# Patient Record
Sex: MALE | Race: BLACK OR AFRICAN AMERICAN | NOT HISPANIC OR LATINO | URBAN - METROPOLITAN AREA
[De-identification: names, ages, dates, MRNs, and addresses within clinical notes are randomized per-mention and may not be internally consistent; named-entity substitution may affect disease eponyms.]

---

## 2017-04-10 VITALS
OXYGEN SATURATION: 99 % | RESPIRATION RATE: 16 BRPM | SYSTOLIC BLOOD PRESSURE: 142 MMHG | DIASTOLIC BLOOD PRESSURE: 86 MMHG | HEART RATE: 67 BPM | TEMPERATURE: 98 F

## 2017-04-10 RX ORDER — CHLORHEXIDINE GLUCONATE 213 G/1000ML
1 SOLUTION TOPICAL ONCE
Qty: 0 | Refills: 0 | Status: DISCONTINUED | OUTPATIENT
Start: 2017-04-12 | End: 2017-04-12

## 2017-04-10 NOTE — H&P ADULT - ASSESSMENT
57 y.o Male, POOR HISTORIAN, Current smoker, with PMHx of HTN, dyslipidemia, CKD Stage 3 (Cr 1.5 by labs done @ [facility]SH on 03/201/17), TIA in 2007 , Non obstructive CAD by cath @ [facility] on 10/23/13 (60% distal OM3 stenosis and 70% Ostial second PDA)- medically managed, s/p admission @ [facility] in 03/2017 for Pulmonary Edema in the setting of a Hypertensive urgency along with NSTEMI and Echo revealing depressed EF and referred to [facility] for recommended Cardiac Cath with possible intervention if clinically indicated to r/o suspected underlying CAD.     Patient took home plavix 75mg once this AM, Loaded with ASA 325mg PO x1  Crt 1.43 CKD stage II on arrival (near baseline, Hx of CKD stage III as outpatient), early for hydration.    Risks & benefits of procedure and alternative therapy have been explained to the patient including but not limited to: allergic reaction, bleeding w/possible need for blood transfusion, infection, renal and vascular compromise, limb damage, arrhythmia, stroke, vessel dissection/perforation, Myocardial infarction, emergent CABG. Informed consent obtained and in chart.

## 2017-04-10 NOTE — H&P ADULT - HISTORY OF PRESENT ILLNESS
57 y.o Male, POOR HISTORIAN, Current smoker, with PMHx of HTN, dyslipidemia, CKD, TIA in 2007 , s/p admission @ [facility] in 03/2017 for SOB found to have Pulmonary Edema in the setting of a Hypertensive urgency along with NSTEMI.  He wasdiuresed  and  medically optimized on his antihypertensive regimen,  and subsequently d/c'd home  after euvolemic State for outpt cardiac workup. Since his discharge, pt reports feeling better.  He does report "slight" MULLIGAN  with increasing fatigue.  He reports an ET of no more than 10 city blocks before becoming SOB and tired. Symptoms are relieved after a minutes of  rest. He denies   experiencing any CP, recent PND/orthopnea, LE edema, palpitations, dizziness, or syncope.  He has had no other cardiac workup done other than an Echo @ [facility]   which revealed     In light of pt's risk factors, Known CAD, above CCS Anginal Class 3 Equivalent Symptoms, and abnormal Stress test, pt is now referred to [facility] for recommended Cardiac Cath with possible intervention if clinically indicated to r/o suspected underlying CAD. 57 y.o Male, POOR HISTORIAN, Current smoker, with PMHx of HTN, dyslipidemia, CKD, TIA in 2007 , Non obstructive CAD with 60% distal OM3 stenosis and 70% Ostial second PDA medically managed, s/p admission @ [facility] in 03/2017 for SOB found to have Pulmonary Edema in the setting of a Hypertensive urgency along with NSTEMI.  He wasdiuresed  and  medically optimized on his antihypertensive regimen,  and subsequently d/c'd home  after euvolemic State for outpt cardiac workup. Since his discharge, pt reports feeling better.  He does report "slight" MULLIGAN  with increasing fatigue.  He reports an ET of no more than 10 city blocks before becoming SOB and tired. Symptoms are relieved after a minutes of  rest. He denies   experiencing any CP, recent PND/orthopnea, LE edema, palpitations, dizziness, or syncope.  He has had no other cardiac workup done other than an Echo done @ [facility] 03/15/17 which revealed moderate concentric LVH, LVEF of 45%, Pseudonormal LV filling pattern, consistent with elevated LA pressure. Mild AR.      In light of pt's risk factors, above CCS Anginal Class 3 Equivalent Symptoms, and depressed EF, pt is now referred to [facility] for recommended Cardiac Cath with possible intervention if clinically indicated to r/o suspected underlying CAD. ***CKD- Early hydration       57 y.o Male, POOR HISTORIAN, Current smoker, with PMHx of HTN, dyslipidemia, CKD, TIA in 2007 , Non obstructive CAD 60% distal OM3 stenosis and 70% Ostial second PDA- medically managed, s/p admission @ [facility] in 03/2017 for Pulmonary Edema in the setting of a Hypertensive urgency along with NSTEMI.  Pt was  diuresed and medically optimized on his antihypertensive regimen and subsequently d/c'd home  after euvolemic State for outpt cardiac workup. Since his discharge, pt reports feeling better.  He does report "slight" MULLIGAN  with increasing fatigue.  He reports an ET of no more than 10 city blocks before becoming SOB and tired. Symptoms are relieved after a minutes of  rest. He denies   experiencing any CP, recent PND/orthopnea, LE edema, palpitations, dizziness, or syncope.  He has had no other cardiac workup done other than an Echo done @ [facility] 03/15/17 which revealed moderate concentric LVH, LVEF of 45%, Pseudonormal LV filling pattern, consistent with elevated LA pressure. Mild AR.      In light of pt's risk factors, above CCS Anginal Class 3 Equivalent Symptoms, and depressed EF, pt is now referred to [facility] for recommended Cardiac Cath with possible intervention if clinically indicated to r/o suspected underlying CAD. ****PT TO BRING IN ALL MEDS       ***CKD- Early hydration       57 y.o Male, POOR HISTORIAN, Current smoker, with PMHx of HTN, dyslipidemia, CKD, TIA in 2007 , Non obstructive CAD 60% distal OM3 stenosis and 70% Ostial second PDA- medically managed, s/p admission @ [facility] in 03/2017 for Pulmonary Edema in the setting of a Hypertensive urgency along with NSTEMI.  Pt was  diuresed and medically optimized on his antihypertensive regimen and subsequently d/c'd home  after euvolemic State for outpt cardiac workup. Since his discharge, pt reports feeling better.  He does report "slight" MULLIGAN  with increasing fatigue.  He reports an ET of no more than 10 city blocks before becoming SOB and tired. Symptoms are relieved after a minutes of  rest. He denies   experiencing any CP, recent PND/orthopnea, LE edema, palpitations, dizziness, or syncope.  He has had no other cardiac workup done other than an Echo done @ [facility] 03/15/17 which revealed moderate concentric LVH, LVEF of 45%, Pseudonormal LV filling pattern, consistent with elevated LA pressure. Mild AR.      In light of pt's risk factors, above CCS Anginal Class 3 Equivalent Symptoms, and depressed EF, pt is now referred to [facility] for recommended Cardiac Cath with possible intervention if clinically indicated to r/o suspected underlying CAD. ****PT TO BRING IN ALL MEDS       ***CKD- Early hydration       57 y.o Male, POOR HISTORIAN, Current smoker, with PMHx of HTN, dyslipidemia, CKD, TIA in 2007 , Non obstructive CAD by cath @ [facility] on 10/23/13 (60% distal OM3 stenosis and 70% Ostial second PDA)- medically managed, s/p admission @ [facility] in 03/2017 for Pulmonary Edema in the setting of a Hypertensive urgency along with NSTEMI.  Pt was  diuresed and medically optimized on his antihypertensive regimen and subsequently d/c'd home  after euvolemic State for outpt cardiac workup. Since his discharge, pt reports feeling better.  He does report "slight" MULLIGAN  with increasing fatigue.  He reports an ET of no more than 10 city blocks before becoming SOB and tired. Symptoms are relieved after a minutes of  rest. He denies   experiencing any CP, recent PND/orthopnea, LE edema, palpitations, dizziness, or syncope.  He has had no other cardiac workup done other than an Echo done @ [facility] 03/15/17 which revealed moderate concentric LVH, LVEF of 45%, Pseudonormal LV filling pattern, consistent with elevated LA pressure. Mild AR.      In light of pt's risk factors, above CCS Anginal Class 3 Equivalent Symptoms, and depressed EF, pt is now referred to [facility] for recommended Cardiac Cath with possible intervention if clinically indicated to r/o suspected underlying CAD. ****PT TO BRING IN ALL MEDS       ***CKD- Early hydration       57 y.o Male, POOR HISTORIAN, Current smoker, with PMHx of HTN, dyslipidemia, CKD Stage 3 (Cr 1.5 by labs done @ OSH on 03/201/17), TIA in 2007 , Non obstructive CAD by cath @ [facility] on 10/23/13 (60% distal OM3 stenosis and 70% Ostial second PDA)- medically managed, s/p admission @ [facility] in 03/2017 for Pulmonary Edema in the setting of a Hypertensive urgency along with NSTEMI.  Pt was  diuresed and medically optimized on his antihypertensive regimen and subsequently d/c'd home  after euvolemic State for outpt cardiac workup. Since his discharge, pt reports feeling better.  He does report "slight" MULLIGAN  with increasing fatigue.  He reports an ET of no more than 10 city blocks before becoming SOB and tired. Symptoms are relieved after a minutes of  rest. He denies   experiencing any CP, recent PND/orthopnea, LE edema, palpitations, dizziness, or syncope.  He has had no other cardiac workup done other than an Echo done @ [facility] 03/15/17 which revealed moderate concentric LVH, LVEF of 45%, Pseudonormal LV filling pattern, consistent with elevated LA pressure. Mild AR.      In light of pt's risk factors, above CCS Anginal Class 3 Equivalent Symptoms, and depressed EF, pt is now referred to [facility] for recommended Cardiac Cath with possible intervention if clinically indicated to r/o suspected underlying CAD. ****PT TO BRING IN ALL MEDS       ***CKD- Early hydration       57 y.o Male, POOR HISTORIAN, Current smoker, with PMHx of HTN, dyslipidemia, CKD Stage 3 (Cr 1.5 by labs done @ OSH on 03/201/17), TIA in 2007 , Non obstructive CAD by cath @ [facility] on 10/23/13 (60% distal OM3 stenosis and 70% Ostial second PDA)- medically managed, s/p admission @ [facility] in 03/2017 for Pulmonary Edema in the setting of a Hypertensive urgency along with NSTEMI.  Pt was  diuresed and medically optimized on his antihypertensive regimen and subsequently d/c'd home  after euvolemic State for outpt cardiac workup. Since his discharge, pt reports feeling better.  He does report "slight" MULLIGAN  with increasing fatigue.  He reports an ET of no more than 10 city blocks before becoming SOB and tired. Symptoms are relieved after a minutes of  rest. He denies   experiencing any CP, recent PND/orthopnea, LE edema, palpitations, dizziness, or syncope.  He has had no other cardiac workup done other than an Echo done @ [facility] 03/15/17 which revealed moderate concentric LVH, LVEF of 45%, Pseudonormal LV filling pattern, consistent with elevated LA pressure. Mild AR.      In light of pt's risk factors, above CCS Anginal Class 3 Equivalent Symptoms, and depressed EF, pt is now referred to [facility] for recommended Cardiac Cath with possible intervention if clinically indicated to r/o suspected underlying CAD. 57 y.o Male, POOR HISTORIAN, Current smoker, with PMHx of HTN, dyslipidemia, CKD Stage 3 (Cr 1.5 by labs done @ OSH on 03/201/17), TIA in 2007 , Non obstructive CAD by cath @ [facility] on 10/23/13 (60% distal OM3 stenosis and 70% Ostial second PDA)- medically managed, s/p admission @ [facility] in 03/2017 for Pulmonary Edema in the setting of a Hypertensive urgency along with NSTEMI.  Pt was  diuresed and medically optimized on his antihypertensive regimen and subsequently d/c'd home  after euvolemic State for outpt cardiac workup. Since his discharge, pt reports feeling better.  He does report "slight" MULLIGAN  with increasing fatigue.  He reports an ET of no more than 10 city blocks before becoming SOB and tired. Symptoms are relieved after a minutes of  rest. He denies   experiencing any CP, recent PND/orthopnea, LE edema, palpitations, dizziness, or syncope.  He has had no other cardiac workup done other than an Echo done @ [facility] 03/15/17 which revealed moderate concentric LVH, LVEF of 45%, Pseudonormal LV filling pattern, consistent with elevated LA pressure. Mild AR.      In light of pt's risk factors, above CCS Anginal Class 3 Equivalent Symptoms, and depressed EF, pt is now referred to [facility] for recommended Cardiac Cath with possible intervention if clinically indicated to r/o suspected underlying CAD.

## 2017-04-10 NOTE — H&P ADULT - PMH
CKD (chronic kidney disease)    Hyperlipidemia    Hypertension    NSTEMI (non-ST elevation myocardial infarction)    TIA (transient ischemic attack)

## 2017-04-12 ENCOUNTER — OUTPATIENT (OUTPATIENT)
Dept: OUTPATIENT SERVICES | Facility: HOSPITAL | Age: 57
LOS: 1 days | Discharge: NURSING, REHAB OR LONG-TERM CARE | End: 2017-04-12
Payer: COMMERCIAL

## 2017-04-12 DIAGNOSIS — I25.110 ATHEROSCLEROTIC HEART DISEASE OF NATIVE CORONARY ARTERY WITH UNSTABLE ANGINA PECTORIS: ICD-10-CM

## 2017-04-12 DIAGNOSIS — R94.39 ABNORMAL RESULT OF OTHER CARDIOVASCULAR FUNCTION STUDY: ICD-10-CM

## 2017-04-12 DIAGNOSIS — E78.5 HYPERLIPIDEMIA, UNSPECIFIED: ICD-10-CM

## 2017-04-12 DIAGNOSIS — I10 ESSENTIAL (PRIMARY) HYPERTENSION: ICD-10-CM

## 2017-04-12 DIAGNOSIS — Z82.49 FAMILY HISTORY OF ISCHEMIC HEART DISEASE AND OTHER DISEASES OF THE CIRCULATORY SYSTEM: ICD-10-CM

## 2017-04-12 LAB
ALBUMIN SERPL ELPH-MCNC: 3.9 G/DL — SIGNIFICANT CHANGE UP (ref 3.4–5)
ALP SERPL-CCNC: 53 U/L — SIGNIFICANT CHANGE UP (ref 40–120)
ALT FLD-CCNC: 31 U/L — SIGNIFICANT CHANGE UP (ref 12–42)
ANION GAP SERPL CALC-SCNC: 6 MMOL/L — LOW (ref 9–16)
APTT BLD: 33.4 SEC — SIGNIFICANT CHANGE UP (ref 27.5–37.4)
AST SERPL-CCNC: 15 U/L — SIGNIFICANT CHANGE UP (ref 15–37)
BASOPHILS NFR BLD AUTO: 0.4 % — SIGNIFICANT CHANGE UP (ref 0–2)
BILIRUB SERPL-MCNC: 0.4 MG/DL — SIGNIFICANT CHANGE UP (ref 0.2–1.2)
BUN SERPL-MCNC: 15 MG/DL — SIGNIFICANT CHANGE UP (ref 7–23)
CALCIUM SERPL-MCNC: 8.2 MG/DL — LOW (ref 8.5–10.5)
CHLORIDE SERPL-SCNC: 108 MMOL/L — SIGNIFICANT CHANGE UP (ref 96–108)
CHOLEST SERPL-MCNC: 91 MG/DL — SIGNIFICANT CHANGE UP
CK MB CFR SERPL CALC: 1.2 NG/ML — SIGNIFICANT CHANGE UP (ref 0.5–3.6)
CO2 SERPL-SCNC: 25 MMOL/L — SIGNIFICANT CHANGE UP (ref 22–31)
CREAT SERPL-MCNC: 1.43 MG/DL — HIGH (ref 0.5–1.3)
CRP SERPL-MCNC: <0.29 MG/DL — SIGNIFICANT CHANGE UP
EOSINOPHIL NFR BLD AUTO: 5.1 % — SIGNIFICANT CHANGE UP (ref 0–6)
GLUCOSE SERPL-MCNC: 115 MG/DL — HIGH (ref 70–99)
HBA1C BLD-MCNC: 6.2 % — HIGH (ref 4.8–5.6)
HCT VFR BLD CALC: 42.1 % — SIGNIFICANT CHANGE UP (ref 39–50)
HDLC SERPL-MCNC: 38 MG/DL — LOW
HGB BLD-MCNC: 14.8 G/DL — SIGNIFICANT CHANGE UP (ref 13–17)
INR BLD: 1.01 — SIGNIFICANT CHANGE UP (ref 0.88–1.16)
LIPID PNL WITH DIRECT LDL SERPL: 25 MG/DL — SIGNIFICANT CHANGE UP
LYMPHOCYTES # BLD AUTO: 32.1 % — SIGNIFICANT CHANGE UP (ref 13–44)
MCHC RBC-ENTMCNC: 30.1 PG — SIGNIFICANT CHANGE UP (ref 27–34)
MCHC RBC-ENTMCNC: 35.2 G/DL — SIGNIFICANT CHANGE UP (ref 32–36)
MCV RBC AUTO: 85.6 FL — SIGNIFICANT CHANGE UP (ref 80–100)
MONOCYTES NFR BLD AUTO: 5.7 % — SIGNIFICANT CHANGE UP (ref 2–14)
NEUTROPHILS NFR BLD AUTO: 56.7 % — SIGNIFICANT CHANGE UP (ref 43–77)
PLATELET # BLD AUTO: 195 K/UL — SIGNIFICANT CHANGE UP (ref 150–400)
POTASSIUM SERPL-MCNC: 4.2 MMOL/L — SIGNIFICANT CHANGE UP (ref 3.5–5.3)
POTASSIUM SERPL-SCNC: 4.2 MMOL/L — SIGNIFICANT CHANGE UP (ref 3.5–5.3)
PROT SERPL-MCNC: 7.1 G/DL — SIGNIFICANT CHANGE UP (ref 6.4–8.2)
PROTHROM AB SERPL-ACNC: 11.2 SEC — SIGNIFICANT CHANGE UP (ref 9.8–12.7)
RBC # BLD: 4.92 M/UL — SIGNIFICANT CHANGE UP (ref 4.2–5.8)
RBC # FLD: 14.3 % — SIGNIFICANT CHANGE UP (ref 10.3–16.9)
SODIUM SERPL-SCNC: 139 MMOL/L — SIGNIFICANT CHANGE UP (ref 135–145)
TOTAL CHOLESTEROL/HDL RATIO MEASUREMENT: 2.4 RATIO — SIGNIFICANT CHANGE UP
TRIGL SERPL-MCNC: 138 MG/DL — SIGNIFICANT CHANGE UP
WBC # BLD: 6.8 K/UL — SIGNIFICANT CHANGE UP (ref 3.8–10.5)
WBC # FLD AUTO: 6.8 K/UL — SIGNIFICANT CHANGE UP (ref 3.8–10.5)

## 2017-04-12 PROCEDURE — 93010 ELECTROCARDIOGRAM REPORT: CPT

## 2017-04-12 PROCEDURE — 85730 THROMBOPLASTIN TIME PARTIAL: CPT

## 2017-04-12 PROCEDURE — C1769: CPT

## 2017-04-12 PROCEDURE — 82553 CREATINE MB FRACTION: CPT

## 2017-04-12 PROCEDURE — 83036 HEMOGLOBIN GLYCOSYLATED A1C: CPT

## 2017-04-12 PROCEDURE — C1887: CPT

## 2017-04-12 PROCEDURE — 85025 COMPLETE CBC W/AUTO DIFF WBC: CPT

## 2017-04-12 PROCEDURE — 93458 L HRT ARTERY/VENTRICLE ANGIO: CPT | Mod: 26

## 2017-04-12 PROCEDURE — 93005 ELECTROCARDIOGRAM TRACING: CPT

## 2017-04-12 PROCEDURE — 86140 C-REACTIVE PROTEIN: CPT

## 2017-04-12 PROCEDURE — 85610 PROTHROMBIN TIME: CPT

## 2017-04-12 PROCEDURE — 93458 L HRT ARTERY/VENTRICLE ANGIO: CPT

## 2017-04-12 PROCEDURE — 80053 COMPREHEN METABOLIC PANEL: CPT

## 2017-04-12 PROCEDURE — 80061 LIPID PANEL: CPT

## 2017-04-12 PROCEDURE — 36415 COLL VENOUS BLD VENIPUNCTURE: CPT

## 2017-04-12 PROCEDURE — 82550 ASSAY OF CK (CPK): CPT

## 2017-04-12 RX ORDER — SODIUM CHLORIDE 9 MG/ML
500 INJECTION INTRAMUSCULAR; INTRAVENOUS; SUBCUTANEOUS
Qty: 0 | Refills: 0 | Status: DISCONTINUED | OUTPATIENT
Start: 2017-04-12 | End: 2017-04-12

## 2017-04-12 RX ORDER — HYDRALAZINE HCL 50 MG
0 TABLET ORAL
Qty: 0 | Refills: 0 | COMMUNITY

## 2017-04-12 RX ORDER — LOSARTAN POTASSIUM 100 MG/1
1 TABLET, FILM COATED ORAL
Qty: 0 | Refills: 0 | COMMUNITY

## 2017-04-12 RX ORDER — AMLODIPINE BESYLATE 2.5 MG/1
5 TABLET ORAL ONCE
Qty: 0 | Refills: 0 | Status: DISCONTINUED | OUTPATIENT
Start: 2017-04-12 | End: 2017-04-12

## 2017-04-12 RX ORDER — ASPIRIN/CALCIUM CARB/MAGNESIUM 324 MG
1 TABLET ORAL
Qty: 0 | Refills: 0 | COMMUNITY

## 2017-04-12 RX ORDER — ASPIRIN/CALCIUM CARB/MAGNESIUM 324 MG
325 TABLET ORAL ONCE
Qty: 0 | Refills: 0 | Status: COMPLETED | OUTPATIENT
Start: 2017-04-12 | End: 2017-04-12

## 2017-04-12 RX ADMIN — SODIUM CHLORIDE 75 MILLILITER(S): 9 INJECTION INTRAMUSCULAR; INTRAVENOUS; SUBCUTANEOUS at 11:14

## 2017-04-12 RX ADMIN — Medication 325 MILLIGRAM(S): at 11:16

## 2019-01-07 ENCOUNTER — INPATIENT (INPATIENT)
Facility: HOSPITAL | Age: 59
LOS: 0 days | Discharge: HOME | DRG: 281 | End: 2019-01-08
Attending: INTERNAL MEDICINE | Admitting: INTERNAL MEDICINE
Payer: COMMERCIAL

## 2019-01-07 VITALS
TEMPERATURE: 98 F | HEART RATE: 72 BPM | RESPIRATION RATE: 16 BRPM | OXYGEN SATURATION: 100 % | SYSTOLIC BLOOD PRESSURE: 123 MMHG | DIASTOLIC BLOOD PRESSURE: 77 MMHG

## 2019-01-07 DIAGNOSIS — E78.5 HYPERLIPIDEMIA, UNSPECIFIED: ICD-10-CM

## 2019-01-07 DIAGNOSIS — N18.9 CHRONIC KIDNEY DISEASE, UNSPECIFIED: ICD-10-CM

## 2019-01-07 DIAGNOSIS — I21.4 NON-ST ELEVATION (NSTEMI) MYOCARDIAL INFARCTION: ICD-10-CM

## 2019-01-07 DIAGNOSIS — F17.200 NICOTINE DEPENDENCE, UNSPECIFIED, UNCOMPLICATED: ICD-10-CM

## 2019-01-07 DIAGNOSIS — J18.9 PNEUMONIA, UNSPECIFIED ORGANISM: ICD-10-CM

## 2019-01-07 DIAGNOSIS — I50.22 CHRONIC SYSTOLIC (CONGESTIVE) HEART FAILURE: ICD-10-CM

## 2019-01-07 DIAGNOSIS — I10 ESSENTIAL (PRIMARY) HYPERTENSION: ICD-10-CM

## 2019-01-07 PROBLEM — G45.9 TRANSIENT CEREBRAL ISCHEMIC ATTACK, UNSPECIFIED: Chronic | Status: ACTIVE | Noted: 2017-04-10

## 2019-01-07 LAB
ALBUMIN SERPL ELPH-MCNC: 3.7 G/DL — SIGNIFICANT CHANGE UP (ref 3.3–5)
ALP SERPL-CCNC: 62 U/L — SIGNIFICANT CHANGE UP (ref 40–120)
ALT FLD-CCNC: 114 U/L — HIGH (ref 10–45)
ANION GAP SERPL CALC-SCNC: 17 MMOL/L — SIGNIFICANT CHANGE UP (ref 5–17)
APTT BLD: 32.9 SEC — SIGNIFICANT CHANGE UP (ref 27.5–36.3)
AST SERPL-CCNC: 49 U/L — HIGH (ref 10–40)
BASOPHILS NFR BLD AUTO: 0.5 % — SIGNIFICANT CHANGE UP (ref 0–2)
BILIRUB SERPL-MCNC: 0.3 MG/DL — SIGNIFICANT CHANGE UP (ref 0.2–1.2)
BUN SERPL-MCNC: 17 MG/DL — SIGNIFICANT CHANGE UP (ref 7–23)
CALCIUM SERPL-MCNC: 8.8 MG/DL — SIGNIFICANT CHANGE UP (ref 8.4–10.5)
CHLORIDE SERPL-SCNC: 102 MMOL/L — SIGNIFICANT CHANGE UP (ref 96–108)
CHOLEST SERPL-MCNC: 124 MG/DL — SIGNIFICANT CHANGE UP (ref 10–199)
CK MB CFR SERPL CALC: 1.8 NG/ML — SIGNIFICANT CHANGE UP (ref 0–6.7)
CK SERPL-CCNC: 107 U/L — SIGNIFICANT CHANGE UP (ref 30–200)
CO2 SERPL-SCNC: 19 MMOL/L — LOW (ref 22–31)
CREAT SERPL-MCNC: 1.08 MG/DL — SIGNIFICANT CHANGE UP (ref 0.5–1.3)
CRP SERPL-MCNC: 0.95 MG/DL — HIGH (ref 0–0.4)
EOSINOPHIL NFR BLD AUTO: 2.6 % — SIGNIFICANT CHANGE UP (ref 0–6)
GLUCOSE SERPL-MCNC: 96 MG/DL — SIGNIFICANT CHANGE UP (ref 70–99)
HBA1C BLD-MCNC: 6.1 % — HIGH (ref 4–5.6)
HCT VFR BLD CALC: 42.2 % — SIGNIFICANT CHANGE UP (ref 39–50)
HDLC SERPL-MCNC: 24 MG/DL — LOW
HGB BLD-MCNC: 14.2 G/DL — SIGNIFICANT CHANGE UP (ref 13–17)
INR BLD: 1.05 — SIGNIFICANT CHANGE UP (ref 0.88–1.16)
LIPID PNL WITH DIRECT LDL SERPL: 41 MG/DL — SIGNIFICANT CHANGE UP
LYMPHOCYTES # BLD AUTO: 20.4 % — SIGNIFICANT CHANGE UP (ref 13–44)
MCHC RBC-ENTMCNC: 29.5 PG — SIGNIFICANT CHANGE UP (ref 27–34)
MCHC RBC-ENTMCNC: 33.6 G/DL — SIGNIFICANT CHANGE UP (ref 32–36)
MCV RBC AUTO: 87.7 FL — SIGNIFICANT CHANGE UP (ref 80–100)
MONOCYTES NFR BLD AUTO: 3.9 % — SIGNIFICANT CHANGE UP (ref 2–14)
NEUTROPHILS NFR BLD AUTO: 72.6 % — SIGNIFICANT CHANGE UP (ref 43–77)
PLATELET # BLD AUTO: 402 K/UL — HIGH (ref 150–400)
POTASSIUM SERPL-MCNC: 4.6 MMOL/L — SIGNIFICANT CHANGE UP (ref 3.5–5.3)
POTASSIUM SERPL-SCNC: 4.6 MMOL/L — SIGNIFICANT CHANGE UP (ref 3.5–5.3)
PROT SERPL-MCNC: 7.5 G/DL — SIGNIFICANT CHANGE UP (ref 6–8.3)
PROTHROM AB SERPL-ACNC: 11.9 SEC — SIGNIFICANT CHANGE UP (ref 10–12.9)
RBC # BLD: 4.81 M/UL — SIGNIFICANT CHANGE UP (ref 4.2–5.8)
RBC # FLD: 15.6 % — SIGNIFICANT CHANGE UP (ref 10.3–16.9)
SODIUM SERPL-SCNC: 138 MMOL/L — SIGNIFICANT CHANGE UP (ref 135–145)
TOTAL CHOLESTEROL/HDL RATIO MEASUREMENT: 5.2 RATIO — SIGNIFICANT CHANGE UP (ref 3.4–9.6)
TRIGL SERPL-MCNC: 293 MG/DL — HIGH (ref 10–149)
TROPONIN T SERPL-MCNC: 0.02 NG/ML — HIGH (ref 0–0.01)
WBC # BLD: 8.5 K/UL — SIGNIFICANT CHANGE UP (ref 3.8–10.5)
WBC # FLD AUTO: 8.5 K/UL — SIGNIFICANT CHANGE UP (ref 3.8–10.5)

## 2019-01-07 PROCEDURE — 93010 ELECTROCARDIOGRAM REPORT: CPT

## 2019-01-07 RX ORDER — ASPIRIN/CALCIUM CARB/MAGNESIUM 324 MG
81 TABLET ORAL DAILY
Qty: 0 | Refills: 0 | Status: DISCONTINUED | OUTPATIENT
Start: 2019-01-07 | End: 2019-01-08

## 2019-01-07 RX ORDER — ISOSORBIDE DINITRATE 5 MG/1
1 TABLET ORAL
Qty: 0 | Refills: 0 | COMMUNITY

## 2019-01-07 RX ORDER — AMLODIPINE BESYLATE 2.5 MG/1
10 TABLET ORAL DAILY
Qty: 0 | Refills: 0 | Status: DISCONTINUED | OUTPATIENT
Start: 2019-01-07 | End: 2019-01-08

## 2019-01-07 RX ORDER — SODIUM CHLORIDE 9 MG/ML
500 INJECTION INTRAMUSCULAR; INTRAVENOUS; SUBCUTANEOUS
Qty: 0 | Refills: 0 | Status: DISCONTINUED | OUTPATIENT
Start: 2019-01-07 | End: 2019-01-07

## 2019-01-07 RX ORDER — HYDRALAZINE HCL 50 MG
50 TABLET ORAL THREE TIMES A DAY
Qty: 0 | Refills: 0 | Status: DISCONTINUED | OUTPATIENT
Start: 2019-01-07 | End: 2019-01-08

## 2019-01-07 RX ORDER — LOSARTAN POTASSIUM 100 MG/1
100 TABLET, FILM COATED ORAL DAILY
Qty: 0 | Refills: 0 | Status: DISCONTINUED | OUTPATIENT
Start: 2019-01-07 | End: 2019-01-08

## 2019-01-07 RX ORDER — SODIUM CHLORIDE 9 MG/ML
1000 INJECTION INTRAMUSCULAR; INTRAVENOUS; SUBCUTANEOUS
Qty: 0 | Refills: 0 | Status: DISCONTINUED | OUTPATIENT
Start: 2019-01-07 | End: 2019-01-08

## 2019-01-07 RX ORDER — CARVEDILOL PHOSPHATE 80 MG/1
25 CAPSULE, EXTENDED RELEASE ORAL EVERY 12 HOURS
Qty: 0 | Refills: 0 | Status: DISCONTINUED | OUTPATIENT
Start: 2019-01-07 | End: 2019-01-08

## 2019-01-07 RX ORDER — CHLORHEXIDINE GLUCONATE 213 G/1000ML
1 SOLUTION TOPICAL ONCE
Qty: 0 | Refills: 0 | Status: DISCONTINUED | OUTPATIENT
Start: 2019-01-07 | End: 2019-01-07

## 2019-01-07 RX ORDER — INFLUENZA VIRUS VACCINE 15; 15; 15; 15 UG/.5ML; UG/.5ML; UG/.5ML; UG/.5ML
0.5 SUSPENSION INTRAMUSCULAR ONCE
Qty: 0 | Refills: 0 | Status: COMPLETED | OUTPATIENT
Start: 2019-01-07 | End: 2019-01-07

## 2019-01-07 RX ORDER — CLOPIDOGREL BISULFATE 75 MG/1
75 TABLET, FILM COATED ORAL DAILY
Qty: 0 | Refills: 0 | Status: DISCONTINUED | OUTPATIENT
Start: 2019-01-07 | End: 2019-01-08

## 2019-01-07 RX ORDER — CARVEDILOL PHOSPHATE 80 MG/1
1 CAPSULE, EXTENDED RELEASE ORAL
Qty: 0 | Refills: 0 | COMMUNITY

## 2019-01-07 RX ORDER — ISOSORBIDE MONONITRATE 60 MG/1
30 TABLET, EXTENDED RELEASE ORAL DAILY
Qty: 0 | Refills: 0 | Status: DISCONTINUED | OUTPATIENT
Start: 2019-01-07 | End: 2019-01-08

## 2019-01-07 RX ADMIN — SODIUM CHLORIDE 75 MILLILITER(S): 9 INJECTION INTRAMUSCULAR; INTRAVENOUS; SUBCUTANEOUS at 18:52

## 2019-01-07 RX ADMIN — Medication 50 MILLIGRAM(S): at 23:45

## 2019-01-07 NOTE — H&P ADULT - HISTORY OF PRESENT ILLNESS
**INCOMPLETE  59 y/o Male, POOR HISTORIAN, Current smoker (7cigs daily), with PMHx of HTN, HLD, CKD Stage 3 (previous Cr 1.5 in 2017), TIA in 2007, known CAD which has been medically managed, prior admissions to [facility] in 2017 for NSTEMI/HTN urgency/pulmonary edema, who presented to [facility] on 12/30/18 complaining of shortness of breath and dizziness. He reported that he was very short of breath every time he stood up and attempted to walk, relieved with rest. He also reported fevers and productive cough at that time. Labs showed elevated lactic acid, elevated troponin. EKG showed NSR with LVH, no ischemic or ST/T changes. CT scan showed right lower lobe pneumonia. He was admitted for further management of sepsis in the setting of pneumonia and NSTEMI. He was treated with Heparin IV for 48 hours for ACS protocol. He also completed a course of Ceftriaxone/Azithromycin x 7 days (started abx on 12/30/18, last dose 1/5/19) for CAP. He underwent diagnostic cardiac [facility] 1/[facility]/19: RCA moderate luminal irregularities, LM normal, midLAD 70% ectatic vessel, distal LCx 80% ectatic vessel, right radial access. He is now transferred to [facility] for cardiac cath with possible intervention of known CAD if clinically indicated. On arrival to [facility] ED, vitals showed? EKG? Labs?       Diagnostic studies @[facility]:   -	CT Head: No acute infarct or hemorrhage.  -	Blood cultures show no growth for 5 days.  -	Echo 12/31/18 shows overall LV systolic function mildly impaired with EF 45%, RV systolic pressure normal at < 35mmHg.   -	CT Abdomen/Pelvis showed no urinary tract calculus or obstruction, right lower lobe pneumonia. Fecal occult blood was negative. 57 y/o Male, POOR HISTORIAN, Current smoker (0.5ppd daily), with PMHx of HTN, HLD, CKD Stage 3 (previous Cr 1.5 in 2017), TIA in 2007 (no residual deficits), known CAD which has been medically managed, prior admissions to [facility] in 2017 for NSTEMI/HTN urgency/pulmonary edema, who presented to [facility] on 12/30/18 complaining of shortness of breath and dizziness. He reported that he was very short of breath every time he stood up and attempted to walk, relieved with rest. He also reported fevers and productive cough at that time. Labs showed elevated lactic acid, elevated troponin. EKG showed NSR with LVH, no ischemic or ST/T changes. CT scan showed right lower lobe pneumonia. He was admitted for further management of sepsis in the setting of pneumonia and NSTEMI. He was treated with Heparin IV for 48 hours for ACS protocol. He also completed a course of Ceftriaxone/Azithromycin x 7 days (started abx on 12/30/18, last dose 1/5/19) for CAP. He underwent diagnostic cardiac cath @[facility] 1/4/19: RCA moderate luminal irregularities, LM normal, midLAD 70% ectatic vessel, distal LCx 80% ectatic vessel, right radial access. He is now transferred to [facility] for cardiac cath. On arrival to [facility] ED, VSS. EKG shows NSR, q waves in leads II, III, aVF.  Labs significant for ?  He now presents for cardiac catheterization with possible intervention of known CAD if clinically indicated.       Diagnostic studies @[facility]:   -	CT Head: No acute infarct or hemorrhage.  -	Blood cultures show no growth for 5 days.  -	Echo 12/31/18 shows overall LV systolic function mildly impaired with EF 45%, RV systolic pressure normal at < 35mmHg.   -	CT Abdomen/Pelvis showed no urinary tract calculus or obstruction, right lower lobe pneumonia. Fecal occult blood was negative. 57 y/o Male, POOR HISTORIAN, Current smoker (0.5ppd daily), with PMHx of HTN, HLD, CKD Stage 3 (previous Cr 1.5 in 2017), TIA in 2007 (no residual deficits), known CAD which has been medically managed, prior admissions to [facility] in 2017 for NSTEMI/HTN urgency/pulmonary edema, who presented to [facility] on 12/30/18 complaining of shortness of breath and dizziness. He reported that he was very short of breath every time he stood up and attempted to walk, relieved with rest. He also reported fevers and productive cough at that time. Labs showed elevated lactic acid, elevated troponin. EKG showed NSR with LVH, no ischemic or ST/T changes. CT scan showed right lower lobe pneumonia. He was admitted for further management of sepsis in the setting of pneumonia and NSTEMI. He was treated with Heparin IV for 48 hours for ACS protocol. He also completed a course of Ceftriaxone/Azithromycin x 7 days (started abx on 12/30/18, last dose 1/5/19) for CAP. He underwent diagnostic cardiac cath @[facility] 1/4/19: RCA moderate luminal irregularities, LM normal, midLAD 70% ectatic vessel, distal LCx 80% ectatic vessel, right radial access. He is now transferred to [facility] for cardiac cath. On arrival to [facility] ED, VSS. EKG shows NSR, q waves in leads II, III, aVF.  Labs significant for troponin 0.02. He now presents for cardiac catheterization with possible intervention of known CAD if clinically indicated.       Diagnostic studies @[facility]:   -	CT Head: No acute infarct or hemorrhage.  -	Blood cultures show no growth for 5 days.  -	Echo 12/31/18 shows overall LV systolic function mildly impaired with EF 45%, RV systolic pressure normal at < 35mmHg.   -	CT Abdomen/Pelvis showed no urinary tract calculus or obstruction, right lower lobe pneumonia. Fecal occult blood was negative.

## 2019-01-07 NOTE — H&P ADULT - PROBLEM SELECTOR PLAN 4
- F/u lipid panel   - Continue Lipitor 40mg daily - F/u lipid panel   - Hold Lipitor 40mg daily in the setting of LFTs, f/u repeat LFTs in AM

## 2019-01-07 NOTE — H&P ADULT - PROBLEM SELECTOR PLAN 6
Diagnosed with RLL pneumonia @[facility], s/p treatment with Ceftriaxone 1g daily and Azithromycin 500mg daily x 7 days (from 12/30/18-1/5/19)  - Currently afebrile, denies productive cough  - Continue to monitor Diagnosed with RLL pneumonia @[facility], s/p treatment with Ceftriaxone 1g daily and Azithromycin 500mg daily x 7 days (from 12/30/18-1/5/19)  - Currently afebrile, denies productive cough  - Continue to monitor  - F/u CXR in AM

## 2019-01-07 NOTE — H&P ADULT - PROBLEM SELECTOR PLAN 3
SBP stable  - Continue Hydralazine 50mg q8 hrs, Amlodipine 10mg daily, Losartan 100mg daily, Coreg 25mg BID, Clonidine 0.2mg q8hrs

## 2019-01-07 NOTE — H&P ADULT - ASSESSMENT
57 y/o Male, POOR HISTORIAN, Current smoker (0.5ppd daily), with PMHx of HTN, HLD, CKD Stage 3 (previous Cr 1.5 in 2017), TIA in 2007 (no residual deficits), known CAD which has been medically managed, prior admissions to [facility] in 2017 for NSTEMI/HTN urgency/pulmonary edema, who presented to [facility] on 12/30/18 complaining of shortness of breath and dizziness. He was admitted for further management of sepsis in the setting of pneumonia and NSTEMI. He was treated with Heparin IV for 48 hours for ACS protocol. He also completed a course of Ceftriaxone/Azithromycin x 7 days (started abx on 12/30/18, last dose 1/5/19) for CAP. He underwent diagnostic cardiac cath @[facility] 1/4/19: RCA moderate luminal irregularities, LM normal, midLAD 70% ectatic vessel, distal LCx 80% ectatic vessel, right radial access. He now presents for cardiac catheterization with possible intervention of known CAD if clinically indicated.

## 2019-01-07 NOTE — H&P ADULT - PROBLEM SELECTOR PLAN 1
Presented to [facility] on 12/30/18 with shortness of breath, elevated troponin I 1.050->1.080, s/p Heparin IV for 48 hours for ACS protocol   - F/u troponin [facility]  - EKG shows NSR with no evidence of acute ischemic changes  - Continue ASA 81mg and Plavix 75mg daily  - S/p cardiac cath @[facility] 1/4/19: RCA moderate luminal irregularities, LM normal, midLAD 70% ectatic vessel, distal LCx 80% ectatic vessel, right radial access.  - NPO for cardiac cath with Dr. Rushing, consent in chart Presented to [facility] on 12/30/18 with shortness of breath, elevated troponin I 1.050->1.080, s/p Heparin IV for 48 hours for ACS protocol   - Troponin @[facility] 0.02  - EKG shows NSR with no evidence of acute ischemic changes  - Continue ASA 81mg and Plavix 75mg daily  - S/p cardiac cath @[facility] 1/4/19: RCA moderate luminal irregularities, LM normal, midLAD 70% ectatic vessel, distal LCx 80% ectatic vessel, right radial access.  - NPO for cardiac cath with Dr. Rushing, consent in chart

## 2019-01-07 NOTE — H&P ADULT - PROBLEM SELECTOR PLAN 7
Currently daily smoker (0.5 ppd)  - Discussed smoking cessation     VTE ppx: Heparin SQ Currently daily smoker (0.5 ppd)  - Discussed smoking cessation     VTE ppx: Heparin SQ  Dispo: NPO for cardiac cath

## 2019-01-07 NOTE — H&P ADULT - PROBLEM SELECTOR PLAN 2
Echo shows EF 45%, currently euvolemic on exam, satting 100% on RA  - Continue Coreg 25mg BID, Losartan 100mg daily   - Strict I&Os, daily weights Echo shows EF 45%, currently euvolemic on exam, satting 100% on RA  - Continue Coreg 25mg BID, Losartan 100mg daily   - Strict I&Os, daily weights  - Was receiving Lasix 40mg IV daily at [facility] which was held for cardiac cath, please reassess volume status/renal function in AM

## 2019-01-07 NOTE — H&P ADULT - PROBLEM SELECTOR PLAN 5
PELON on CKD @[facility] with BUN/Cr on 12/30: 26/1.56; currently resolved   - Creatinine on arrival to [facility] 1/7/19:   - Avoid nephrotoxic agents, continue to monitor   - Gentle pre and post-cath hydration PELON on CKD @[facility] with BUN/Cr on 12/30: 26/1.56; currently resolved   - Creatinine on arrival to [facility] 1/7/19: 1.08  - Avoid nephrotoxic agents, continue to monitor   - Gentle pre and post-cath hydration

## 2019-01-07 NOTE — H&P ADULT - NSHPLABSRESULTS_GEN_ALL_CORE
14.2   8.5   )-----------( 402      ( 07 Jan 2019 17:03 )             42.2   PT/INR - ( 07 Jan 2019 17:03 )   PT: 11.9 sec;   INR: 1.05          PTT - ( 07 Jan 2019 17:03 )  PTT:32.9 sec 14.2   8.5   )-----------( 402      ( 07 Jan 2019 17:03 )             42.2   PT/INR - ( 07 Jan 2019 17:03 )   PT: 11.9 sec;   INR: 1.05          PTT - ( 07 Jan 2019 17:03 )  PTT:32.9 sec  01-07    138  |  102  |  17  ----------------------------<  96  4.6   |  19<L>  |  1.08    Ca    8.8      07 Jan 2019 17:03    TPro  7.5  /  Alb  3.7  /  TBili  0.3  /  DBili  x   /  AST  49<H>  /  ALT  114<H>  /  AlkPhos  62  01-07  CARDIAC MARKERS ( 07 Jan 2019 17:03 )  x     / 0.02 ng/mL / 107 U/L / x     / 1.8 ng/mL    EKG: NSR, q waves in leads II, III, aVF

## 2019-01-07 NOTE — H&P ADULT - NSHPSOCIALHISTORY_GEN_ALL_CORE
Current smoker (7 cigs/day). ETOH use 3-4 beers weekly. Denies illicit drug use. Current smoker (0.5 ppd/day). ETOH use 3-4 beers weekly. Remote cocaine and marijuana use, denies any recent illicit drug use.

## 2019-01-08 ENCOUNTER — TRANSCRIPTION ENCOUNTER (OUTPATIENT)
Age: 59
End: 2019-01-08

## 2019-01-08 VITALS — WEIGHT: 217.38 LBS

## 2019-01-08 LAB
ALBUMIN SERPL ELPH-MCNC: 3.4 G/DL — SIGNIFICANT CHANGE UP (ref 3.3–5)
ALP SERPL-CCNC: 60 U/L — SIGNIFICANT CHANGE UP (ref 40–120)
ALT FLD-CCNC: 102 U/L — HIGH (ref 10–45)
ANION GAP SERPL CALC-SCNC: 11 MMOL/L — SIGNIFICANT CHANGE UP (ref 5–17)
AST SERPL-CCNC: 42 U/L — HIGH (ref 10–40)
BASOPHILS NFR BLD AUTO: 0.4 % — SIGNIFICANT CHANGE UP (ref 0–2)
BILIRUB SERPL-MCNC: 0.3 MG/DL — SIGNIFICANT CHANGE UP (ref 0.2–1.2)
BUN SERPL-MCNC: 18 MG/DL — SIGNIFICANT CHANGE UP (ref 7–23)
CALCIUM SERPL-MCNC: 8.6 MG/DL — SIGNIFICANT CHANGE UP (ref 8.4–10.5)
CHLORIDE SERPL-SCNC: 108 MMOL/L — SIGNIFICANT CHANGE UP (ref 96–108)
CO2 SERPL-SCNC: 22 MMOL/L — SIGNIFICANT CHANGE UP (ref 22–31)
CREAT SERPL-MCNC: 1.21 MG/DL — SIGNIFICANT CHANGE UP (ref 0.5–1.3)
EOSINOPHIL NFR BLD AUTO: 2.2 % — SIGNIFICANT CHANGE UP (ref 0–6)
GLUCOSE SERPL-MCNC: 110 MG/DL — HIGH (ref 70–99)
HCT VFR BLD CALC: 41.2 % — SIGNIFICANT CHANGE UP (ref 39–50)
HGB BLD-MCNC: 13.5 G/DL — SIGNIFICANT CHANGE UP (ref 13–17)
LYMPHOCYTES # BLD AUTO: 26.3 % — SIGNIFICANT CHANGE UP (ref 13–44)
MAGNESIUM SERPL-MCNC: 2 MG/DL — SIGNIFICANT CHANGE UP (ref 1.6–2.6)
MCHC RBC-ENTMCNC: 29 PG — SIGNIFICANT CHANGE UP (ref 27–34)
MCHC RBC-ENTMCNC: 32.8 G/DL — SIGNIFICANT CHANGE UP (ref 32–36)
MCV RBC AUTO: 88.4 FL — SIGNIFICANT CHANGE UP (ref 80–100)
MONOCYTES NFR BLD AUTO: 5 % — SIGNIFICANT CHANGE UP (ref 2–14)
NEUTROPHILS NFR BLD AUTO: 66.1 % — SIGNIFICANT CHANGE UP (ref 43–77)
PLATELET # BLD AUTO: 417 K/UL — HIGH (ref 150–400)
POTASSIUM SERPL-MCNC: 4.2 MMOL/L — SIGNIFICANT CHANGE UP (ref 3.5–5.3)
POTASSIUM SERPL-SCNC: 4.2 MMOL/L — SIGNIFICANT CHANGE UP (ref 3.5–5.3)
PROT SERPL-MCNC: 7 G/DL — SIGNIFICANT CHANGE UP (ref 6–8.3)
RBC # BLD: 4.66 M/UL — SIGNIFICANT CHANGE UP (ref 4.2–5.8)
RBC # FLD: 15.9 % — SIGNIFICANT CHANGE UP (ref 10.3–16.9)
SODIUM SERPL-SCNC: 141 MMOL/L — SIGNIFICANT CHANGE UP (ref 135–145)
WBC # BLD: 8 K/UL — SIGNIFICANT CHANGE UP (ref 3.8–10.5)
WBC # FLD AUTO: 8 K/UL — SIGNIFICANT CHANGE UP (ref 3.8–10.5)

## 2019-01-08 PROCEDURE — 71045 X-RAY EXAM CHEST 1 VIEW: CPT | Mod: 26

## 2019-01-08 PROCEDURE — 93005 ELECTROCARDIOGRAM TRACING: CPT

## 2019-01-08 PROCEDURE — C1889: CPT

## 2019-01-08 PROCEDURE — 85730 THROMBOPLASTIN TIME PARTIAL: CPT

## 2019-01-08 PROCEDURE — 80061 LIPID PANEL: CPT

## 2019-01-08 PROCEDURE — 71045 X-RAY EXAM CHEST 1 VIEW: CPT

## 2019-01-08 PROCEDURE — 84484 ASSAY OF TROPONIN QUANT: CPT

## 2019-01-08 PROCEDURE — 85610 PROTHROMBIN TIME: CPT

## 2019-01-08 PROCEDURE — C1894: CPT

## 2019-01-08 PROCEDURE — C1769: CPT

## 2019-01-08 PROCEDURE — 85025 COMPLETE CBC W/AUTO DIFF WBC: CPT

## 2019-01-08 PROCEDURE — 93010 ELECTROCARDIOGRAM REPORT: CPT

## 2019-01-08 PROCEDURE — 99239 HOSP IP/OBS DSCHRG MGMT >30: CPT

## 2019-01-08 PROCEDURE — 83036 HEMOGLOBIN GLYCOSYLATED A1C: CPT

## 2019-01-08 PROCEDURE — 82550 ASSAY OF CK (CPK): CPT

## 2019-01-08 PROCEDURE — 83735 ASSAY OF MAGNESIUM: CPT

## 2019-01-08 PROCEDURE — 36415 COLL VENOUS BLD VENIPUNCTURE: CPT

## 2019-01-08 PROCEDURE — C1887: CPT

## 2019-01-08 PROCEDURE — 86140 C-REACTIVE PROTEIN: CPT

## 2019-01-08 PROCEDURE — 80053 COMPREHEN METABOLIC PANEL: CPT

## 2019-01-08 PROCEDURE — 82553 CREATINE MB FRACTION: CPT

## 2019-01-08 RX ORDER — ATORVASTATIN CALCIUM 80 MG/1
1 TABLET, FILM COATED ORAL
Qty: 30 | Refills: 11
Start: 2019-01-08 | End: 2020-01-02

## 2019-01-08 RX ORDER — HYDRALAZINE HCL 50 MG
1 TABLET ORAL
Qty: 90 | Refills: 11
Start: 2019-01-08 | End: 2020-01-02

## 2019-01-08 RX ORDER — LOSARTAN POTASSIUM 100 MG/1
1 TABLET, FILM COATED ORAL
Qty: 0 | Refills: 0 | COMMUNITY

## 2019-01-08 RX ORDER — ATORVASTATIN CALCIUM 80 MG/1
1 TABLET, FILM COATED ORAL
Qty: 0 | Refills: 0 | COMMUNITY

## 2019-01-08 RX ORDER — LOSARTAN POTASSIUM 100 MG/1
1 TABLET, FILM COATED ORAL
Qty: 30 | Refills: 11
Start: 2019-01-08 | End: 2020-01-02

## 2019-01-08 RX ORDER — CARVEDILOL PHOSPHATE 80 MG/1
1 CAPSULE, EXTENDED RELEASE ORAL
Qty: 0 | Refills: 0 | COMMUNITY

## 2019-01-08 RX ORDER — ISOSORBIDE MONONITRATE 60 MG/1
1 TABLET, EXTENDED RELEASE ORAL
Qty: 30 | Refills: 11
Start: 2019-01-08 | End: 2020-01-02

## 2019-01-08 RX ORDER — CARVEDILOL PHOSPHATE 80 MG/1
1 CAPSULE, EXTENDED RELEASE ORAL
Qty: 60 | Refills: 11
Start: 2019-01-08 | End: 2020-01-02

## 2019-01-08 RX ORDER — CLOPIDOGREL BISULFATE 75 MG/1
1 TABLET, FILM COATED ORAL
Qty: 0 | Refills: 0 | COMMUNITY

## 2019-01-08 RX ORDER — HYDRALAZINE HCL 50 MG
1 TABLET ORAL
Qty: 0 | Refills: 0 | COMMUNITY

## 2019-01-08 RX ORDER — AMLODIPINE BESYLATE 2.5 MG/1
1 TABLET ORAL
Qty: 0 | Refills: 0 | COMMUNITY

## 2019-01-08 RX ORDER — ISOSORBIDE MONONITRATE 60 MG/1
1 TABLET, EXTENDED RELEASE ORAL
Qty: 0 | Refills: 0 | COMMUNITY

## 2019-01-08 RX ORDER — AMLODIPINE BESYLATE 2.5 MG/1
1 TABLET ORAL
Qty: 30 | Refills: 11
Start: 2019-01-08 | End: 2020-01-02

## 2019-01-08 RX ORDER — ACETAMINOPHEN 500 MG
650 TABLET ORAL EVERY 6 HOURS
Qty: 0 | Refills: 0 | Status: DISCONTINUED | OUTPATIENT
Start: 2019-01-08 | End: 2019-01-08

## 2019-01-08 RX ORDER — CLOPIDOGREL BISULFATE 75 MG/1
1 TABLET, FILM COATED ORAL
Qty: 30 | Refills: 11
Start: 2019-01-08 | End: 2020-01-02

## 2019-01-08 RX ADMIN — Medication 0.2 MILLIGRAM(S): at 00:48

## 2019-01-08 RX ADMIN — Medication 81 MILLIGRAM(S): at 12:11

## 2019-01-08 RX ADMIN — ISOSORBIDE MONONITRATE 30 MILLIGRAM(S): 60 TABLET, EXTENDED RELEASE ORAL at 12:11

## 2019-01-08 RX ADMIN — CLOPIDOGREL BISULFATE 75 MILLIGRAM(S): 75 TABLET, FILM COATED ORAL at 12:11

## 2019-01-08 RX ADMIN — CARVEDILOL PHOSPHATE 25 MILLIGRAM(S): 80 CAPSULE, EXTENDED RELEASE ORAL at 06:12

## 2019-01-08 RX ADMIN — Medication 650 MILLIGRAM(S): at 00:49

## 2019-01-08 RX ADMIN — Medication 50 MILLIGRAM(S): at 06:12

## 2019-01-08 RX ADMIN — Medication 650 MILLIGRAM(S): at 01:20

## 2019-01-08 RX ADMIN — AMLODIPINE BESYLATE 10 MILLIGRAM(S): 2.5 TABLET ORAL at 06:12

## 2019-01-08 RX ADMIN — LOSARTAN POTASSIUM 100 MILLIGRAM(S): 100 TABLET, FILM COATED ORAL at 07:46

## 2019-01-08 RX ADMIN — Medication 0.2 MILLIGRAM(S): at 09:16

## 2019-01-08 NOTE — DISCHARGE NOTE ADULT - MEDICATION SUMMARY - MEDICATIONS TO CHANGE
I will SWITCH the dose or number of times a day I take the medications listed below when I get home from the hospital:    atorvastatin 40 mg oral tablet  -- 1 tab(s) by mouth once a day    Imdur 30 mg oral tablet, extended release  -- 1 tab(s) by mouth once a day (in the morning)    losartan 50 mg oral tablet  -- 1 tab(s) by mouth once a day    carvedilol 12.5 mg oral tablet  -- 1 tab(s) by mouth 2 times a day

## 2019-01-08 NOTE — DISCHARGE NOTE ADULT - ADDITIONAL INSTRUCTIONS
Please make sure to follow up with Dr. Rushing on 1/8/19 @ 130PM and schedule an appointment to see your PCP within 1 week. Please make sure to follow up with Dr. Rushing on 1/18/19 @ 130PM and schedule an appointment to see your PCP within 1 week.

## 2019-01-08 NOTE — DISCHARGE NOTE ADULT - CARE PLAN
Principal Discharge DX:	NSTEMI (non-ST elevation myocardial infarction)  Goal:	Please make sure to follow up with Dr. Rushing on 1/8/19 @ 130PM.  Assessment and plan of treatment:	At [facility], you were diagnosed as having had a heart attack. You were transferred to [facility] and underwent a diagnostic cardiac angiogram. PLEASE CONTINUE ASPIRIN 81MG DAILY AND PLAVIX 75MG DAILY. DO NOT STOP THESE MEDICATIONS FOR ANY REASON AS THEY ARE KEEPING YOUR STENT OPEN AND PREVENTING A HEART ATTACK. Avoid strenuous activity or heavy lifting for the next five days. Do not take a bath or swim for the next five days; you may shower. For any bleeding or hematoma formation (hardened blood collection under the skin) at the access site of your right wrist, please hold pressure and go to the emergency room. Please follow up with Dr. Rushing in 1-2 weeks. For recurrent chest pain, please call your doctor or go to the emergency room. We have provided you with a prescription for cardiac rehab which is like physical therapy for your heart and has been shown to improve quality and quantity of life for people who have had heart attacks. You should also take Amlodipine 10mg once daily, Carvedilol 25mg twice daily, Losartan 100mg once daily, Imdur 60mg once daily, Clonidine 0.2mg three times, daily, and Hydralazine 50mg three times daily.  Secondary Diagnosis:	Chronic systolic congestive heart failure  Goal:	Please make sure to follow up with Dr. Rushing on 1/8/19 @ 130PM.  Assessment and plan of treatment:	When your presented to [facility] you had been diagnosed with fluid in your lungs which was caused by heart failure. Heart failure (HF) is a condition that does not allow your heart to fill or pump properly. Not enough oxygen in your blood gets to your organs and tissues. HF can occur in the right side, the left side, or both lower chambers of your heart. HF is often caused by damage or injury to your heart. The damage may be caused by heart attack, other heart conditions, or high blood pressure. HF is a long-term condition that tends to get worse over time. It is important to manage your health to improve your quality of life. HF can be worsened by heavy alcohol use, smoking, diabetes that is not controlled, or obesity.  Please call your doctor if you experience shortness of breath or have more difficulty breathing, increased swelling of your feet, ankles, hands or abdomen, feeling tired with normal activity or experiencing dizziness or fainting, trouble sleeping or waking up feeling short of breath or coughing, chest pain or pressure, weight gain of 3-5 pounds over 2-3 days.  Please continue taking all your medications as prescribed and follow up with Dr. Rushing within 1-2 weeks of discharge.  Secondary Diagnosis:	Hypertension  Assessment and plan of treatment:	You should take Amlodipine 10mg once daily, Carvedilol 25mg twice daily, Losartan 100mg once daily, Imdur 60mg once daily, Clonidine 0.2mg three times, daily, and Hydralazine 50mg three times daily to help keep your blood pressure controlled. Please check your blood pressure regularly. For blood pressure that is too high or too low please see your doctor or go to the emergency room as necessary.  Secondary Diagnosis:	Hyperlipidemia  Assessment and plan of treatment:	We have decreased your Atorvastatin (Lipitor) from 40mg to 20mg once daily because your liver enzymes were elevated. Please make sure to follow up with your primary care doctor to recheck your liver enzymes and cholesterol levels.  Secondary Diagnosis:	CKD (chronic kidney disease)  Assessment and plan of treatment:	Your kidney function is not optimal. This can be due to a variety of reasons. If you were not aware of this, you should discuss with your Primary Care Doctor about seeing a Nephrologist. If you already have a nephrologist, be sure to continue following up as scheduled.  Secondary Diagnosis:	Pneumonia  Assessment and plan of treatment:	At [facility], you were diagnosed with pneumonia for which you completed a course of antibiotics. Please make sure to see your primary care physician within one week of discharge. Principal Discharge DX:	NSTEMI (non-ST elevation myocardial infarction)  Goal:	Please make sure to follow up with Dr. Rushing on 1/18/19 @ 130PM.  Assessment and plan of treatment:	At [facility], you were diagnosed as having had a heart attack. You were transferred to [facility] and underwent a diagnostic cardiac angiogram. PLEASE CONTINUE ASPIRIN 81MG DAILY AND PLAVIX 75MG DAILY. DO NOT STOP THESE MEDICATIONS FOR ANY REASON AS THEY ARE KEEPING YOUR STENT OPEN AND PREVENTING A HEART ATTACK. Avoid strenuous activity or heavy lifting for the next five days. Do not take a bath or swim for the next five days; you may shower. For any bleeding or hematoma formation (hardened blood collection under the skin) at the access site of your right wrist, please hold pressure and go to the emergency room. Please follow up with Dr. Rushing in 1-2 weeks. For recurrent chest pain, please call your doctor or go to the emergency room. We have provided you with a prescription for cardiac rehab which is like physical therapy for your heart and has been shown to improve quality and quantity of life for people who have had heart attacks. You should also take Amlodipine 10mg once daily, Carvedilol 25mg twice daily, Losartan 100mg once daily, Imdur 60mg once daily, Clonidine 0.2mg three times, daily, and Hydralazine 50mg three times daily.  Secondary Diagnosis:	Chronic systolic congestive heart failure  Goal:	Please make sure to follow up with Dr. Rushing on 1/18/19 @ 130PM.  Assessment and plan of treatment:	When your presented to [facility] you had been diagnosed with fluid in your lungs which was caused by heart failure. Heart failure (HF) is a condition that does not allow your heart to fill or pump properly. Not enough oxygen in your blood gets to your organs and tissues. HF can occur in the right side, the left side, or both lower chambers of your heart. HF is often caused by damage or injury to your heart. The damage may be caused by heart attack, other heart conditions, or high blood pressure. HF is a long-term condition that tends to get worse over time. It is important to manage your health to improve your quality of life. HF can be worsened by heavy alcohol use, smoking, diabetes that is not controlled, or obesity.  Please call your doctor if you experience shortness of breath or have more difficulty breathing, increased swelling of your feet, ankles, hands or abdomen, feeling tired with normal activity or experiencing dizziness or fainting, trouble sleeping or waking up feeling short of breath or coughing, chest pain or pressure, weight gain of 3-5 pounds over 2-3 days.  Please continue taking all your medications as prescribed and follow up with Dr. Rushing within 1-2 weeks of discharge.  Secondary Diagnosis:	Hypertension  Assessment and plan of treatment:	You should take Amlodipine 10mg once daily, Carvedilol 25mg twice daily, Losartan 100mg once daily, Imdur 60mg once daily, Clonidine 0.2mg three times, daily, and Hydralazine 50mg three times daily to help keep your blood pressure controlled. Please check your blood pressure regularly. For blood pressure that is too high or too low please see your doctor or go to the emergency room as necessary.  Secondary Diagnosis:	Hyperlipidemia  Assessment and plan of treatment:	We have decreased your Atorvastatin (Lipitor) from 40mg to 20mg once daily because your liver enzymes were elevated. Please make sure to follow up with your primary care doctor to recheck your liver enzymes and cholesterol levels.  Secondary Diagnosis:	CKD (chronic kidney disease)  Assessment and plan of treatment:	Your kidney function is not optimal. This can be due to a variety of reasons. If you were not aware of this, you should discuss with your Primary Care Doctor about seeing a Nephrologist. If you already have a nephrologist, be sure to continue following up as scheduled.  Secondary Diagnosis:	Pneumonia  Assessment and plan of treatment:	At [facility], you were diagnosed with pneumonia for which you completed a course of antibiotics. Please make sure to see your primary care physician within one week of discharge.

## 2019-01-08 NOTE — DISCHARGE NOTE ADULT - CARE PROVIDER_API CALL
MODERATE Gerry Rushing  [facility] [place] [address]  Phone: [phone]  Fax: (       -

## 2019-01-08 NOTE — DISCHARGE NOTE ADULT - MEDICATION SUMMARY - MEDICATIONS TO TAKE
I will START or STAY ON the medications listed below when I get home from the hospital:    Cardiac Rehab  -- please evaluate and treat pt who is now s/p NSTEMI on 12/30/18 medically managed   -- Indication: For Heart Attack    Ecotrin Adult Low Strength 81 mg oral delayed release tablet  -- 1 tab(s) by mouth once a day  -- Indication: For Heart Attack    losartan 100 mg oral tablet  -- 1 tab(s) by mouth once a day  -- Indication: For Heart Disease/Blood Pressure    cloNIDine 0.2 mg oral tablet  -- 1 tab(s) by mouth 3 times a day  -- Indication: For Blood Pressure    isosorbide mononitrate 60 mg oral tablet, extended release  -- 1 tab(s) by mouth once a day (in the morning)  -- Indication: For Heart Disease/Blood Pressure    atorvastatin 20 mg oral tablet  -- 1 tab(s) by mouth once a day   -- Avoid grapefruit and grapefruit juice while taking this medication.  Do not take this drug if you are pregnant.  It is very important that you take or use this exactly as directed.  Do not skip doses or discontinue unless directed by your doctor.  Obtain medical advice before taking any non-prescription drugs as some may affect the action of this medication.  Take with food or milk.    -- Indication: For Cholesterol    clopidogrel 75 mg oral tablet  -- 1 tab(s) by mouth once a day  -- Indication: For Heart Attack    Coreg 25 mg oral tablet  -- 1 tab(s) by mouth every 12 hours  -- Indication: For Heart Disease/Blood Pressure    amLODIPine 10 mg oral tablet  -- 1 tab(s) by mouth once a day  -- Indication: For Heart Disease/Blood pressure    hydrALAZINE 50 mg oral tablet  -- 1 tab(s) by mouth 3 times a day  -- Indication: For Blood Pressure

## 2019-01-08 NOTE — DISCHARGE NOTE ADULT - PLAN OF CARE
Please make sure to follow up with Dr. Rushing on 1/8/19 @ 130PM. At [facility], you were diagnosed as having had a heart attack. You were transferred to [facility] and underwent a diagnostic cardiac angiogram. PLEASE CONTINUE ASPIRIN 81MG DAILY AND PLAVIX 75MG DAILY. DO NOT STOP THESE MEDICATIONS FOR ANY REASON AS THEY ARE KEEPING YOUR STENT OPEN AND PREVENTING A HEART ATTACK. Avoid strenuous activity or heavy lifting for the next five days. Do not take a bath or swim for the next five days; you may shower. For any bleeding or hematoma formation (hardened blood collection under the skin) at the access site of your right wrist, please hold pressure and go to the emergency room. Please follow up with Dr. Rushing in 1-2 weeks. For recurrent chest pain, please call your doctor or go to the emergency room. We have provided you with a prescription for cardiac rehab which is like physical therapy for your heart and has been shown to improve quality and quantity of life for people who have had heart attacks. You should also take Amlodipine 10mg once daily, Carvedilol 25mg twice daily, Losartan 100mg once daily, Imdur 60mg once daily, Clonidine 0.2mg three times, daily, and Hydralazine 50mg three times daily. When your presented to [facility] you had been diagnosed with fluid in your lungs which was caused by heart failure. Heart failure (HF) is a condition that does not allow your heart to fill or pump properly. Not enough oxygen in your blood gets to your organs and tissues. HF can occur in the right side, the left side, or both lower chambers of your heart. HF is often caused by damage or injury to your heart. The damage may be caused by heart attack, other heart conditions, or high blood pressure. HF is a long-term condition that tends to get worse over time. It is important to manage your health to improve your quality of life. HF can be worsened by heavy alcohol use, smoking, diabetes that is not controlled, or obesity.  Please call your doctor if you experience shortness of breath or have more difficulty breathing, increased swelling of your feet, ankles, hands or abdomen, feeling tired with normal activity or experiencing dizziness or fainting, trouble sleeping or waking up feeling short of breath or coughing, chest pain or pressure, weight gain of 3-5 pounds over 2-3 days.  Please continue taking all your medications as prescribed and follow up with Dr. Rushing within 1-2 weeks of discharge. You should take Amlodipine 10mg once daily, Carvedilol 25mg twice daily, Losartan 100mg once daily, Imdur 60mg once daily, Clonidine 0.2mg three times, daily, and Hydralazine 50mg three times daily to help keep your blood pressure controlled. Please check your blood pressure regularly. For blood pressure that is too high or too low please see your doctor or go to the emergency room as necessary. We have decreased your Atorvastatin (Lipitor) from 40mg to 20mg once daily because your liver enzymes were elevated. Please make sure to follow up with your primary care doctor to recheck your liver enzymes and cholesterol levels. Your kidney function is not optimal. This can be due to a variety of reasons. If you were not aware of this, you should discuss with your Primary Care Doctor about seeing a Nephrologist. If you already have a nephrologist, be sure to continue following up as scheduled. At [facility], you were diagnosed with pneumonia for which you completed a course of antibiotics. Please make sure to see your primary care physician within one week of discharge. Please make sure to follow up with Dr. Rushing on 1/18/19 @ 130PM.

## 2019-01-08 NOTE — DISCHARGE NOTE ADULT - HOSPITAL COURSE
57 y/o Male, POOR HISTORIAN, Current smoker (0.5ppd daily), with PMHx of HTN, HLD, CKD Stage 3 (previous Cr 1.5 in 2017), TIA in 2007 (no residual deficits), known CAD which has been medically managed, prior admissions to [facility] in 2017 for NSTEMI/HTN urgency/pulmonary edema, who presented to [facility] on 12/30/18 complaining of shortness of breath and dizziness. He reported that he was very short of breath every time he stood up and attempted to walk, relieved with rest. He also reported fevers and productive cough at that time. Labs showed elevated lactic acid, elevated troponin. EKG showed NSR with LVH, no ischemic or ST/T changes. CT scan showed right lower lobe pneumonia. He was admitted for further management of sepsis in the setting of pneumonia and NSTEMI. He was treated with Heparin IV for 48 hours for ACS protocol. He also completed a course of Ceftriaxone/Azithromycin x 7 days (started abx on 12/30/18, last dose 1/5/19) for CAP. He underwent diagnostic cardiac cath @[facility] 1/4/19: RCA moderate luminal irregularities, LM normal, midLAD 70% ectatic vessel, distal LCx 80% ectatic vessel, right radial access.   Diagnostic studies @[facility]:   CT Head: No acute infarct or hemorrhage.  Blood cultures show no growth for 5 days.  Echo 12/31/18 shows overall LV systolic function mildly impaired with EF 45%, RV systolic pressure normal at < 35mmHg.   CT Abdomen/Pelvis showed no urinary tract calculus or obstruction, right lower lobe pneumonia. Fecal occult blood was negative.   He is now transferred to [facility] for cardiac cath. On arrival to [facility] ED, VSS. EKG shows NSR, q waves in leads II, III, aVF.  Labs significant for troponin 0.02. He now presents for cardiac catheterization with possible intervention of known CAD if clinically indicated.   Pt is now s/p diagnostic cardiac cath 1/7/19: midLAD 50-60% FFR negative 0.95, distal LAD 90% small vessel, distal LCx 80% small vessel, RCA not injected (known to be mild diffuse disease), EF 45% by echo, right radial band off without bleeding/hematoma. Pt was advised to continue medical management on ASA/Plavix, Amlodipine 10mg once daily, Carvedilol 25mg twice daily, Losartan 100mg once daily, Imdur 60mg once daily, Clonidine 0.2mg three times, daily, and Hydralazine 50mg three times daily, Atorvastatin 20mg daily, and f/u with Dr. Rushing.   Repeat CXR on 1/8/19 as per Dr. Peck, negative. Labs reviewed, no significant events on telemetry, asymptomatic, HD stable, and cleared for discharge by Dr. Peck.  Patient has been given appropriate discharge instructions including medication regimen, access site management and follow up. Medications that patient needs refills on have been e-prescribed to preferred pharmacy.     Temp 97.3 HR 87 /85 RR 16 SpO2 96% on RA  Gen: NAD, A&O x3  Cards: RRR, clear S1 and S2 without murmur  Pulm: CTA B/L without w/r/r  Right Radial Access Site: No hematoma or ooze, peripheral pulses 2+ B/L  Abd: BS+, soft, NT/ND  Ext: no LE edema or ulcerations B/L

## 2019-01-08 NOTE — DISCHARGE NOTE ADULT - SECONDARY DIAGNOSIS.
Chronic systolic congestive heart failure Hypertension Hyperlipidemia CKD (chronic kidney disease) Pneumonia

## 2019-01-08 NOTE — DISCHARGE NOTE ADULT - PATIENT PORTAL LINK FT
You can access the [facility] Patient Portal, offered by [facility], by registering with the following website: [email or web address]

## 2019-01-18 DIAGNOSIS — I50.22 CHRONIC SYSTOLIC (CONGESTIVE) HEART FAILURE: ICD-10-CM

## 2019-01-18 DIAGNOSIS — F17.210 NICOTINE DEPENDENCE, CIGARETTES, UNCOMPLICATED: ICD-10-CM

## 2019-01-18 DIAGNOSIS — I21.4 NON-ST ELEVATION (NSTEMI) MYOCARDIAL INFARCTION: ICD-10-CM

## 2019-01-18 DIAGNOSIS — E78.5 HYPERLIPIDEMIA, UNSPECIFIED: ICD-10-CM

## 2019-01-18 DIAGNOSIS — I13.0 HYPERTENSIVE HEART AND CHRONIC KIDNEY DISEASE WITH HEART FAILURE AND STAGE 1 THROUGH STAGE 4 CHRONIC KIDNEY DISEASE, OR UNSPECIFIED CHRONIC KIDNEY DISEASE: ICD-10-CM

## 2019-01-18 DIAGNOSIS — Z28.21 IMMUNIZATION NOT CARRIED OUT BECAUSE OF PATIENT REFUSAL: ICD-10-CM

## 2019-01-18 DIAGNOSIS — I25.10 ATHEROSCLEROTIC HEART DISEASE OF NATIVE CORONARY ARTERY WITHOUT ANGINA PECTORIS: ICD-10-CM

## 2019-01-18 DIAGNOSIS — Z79.02 LONG TERM (CURRENT) USE OF ANTITHROMBOTICS/ANTIPLATELETS: ICD-10-CM

## 2019-01-18 DIAGNOSIS — Z86.73 PERSONAL HISTORY OF TRANSIENT ISCHEMIC ATTACK (TIA), AND CEREBRAL INFARCTION WITHOUT RESIDUAL DEFICITS: ICD-10-CM

## 2019-01-18 DIAGNOSIS — N18.3 CHRONIC KIDNEY DISEASE, STAGE 3 (MODERATE): ICD-10-CM

## 2021-04-26 PROBLEM — Z00.00 ENCOUNTER FOR PREVENTIVE HEALTH EXAMINATION: Status: ACTIVE | Noted: 2021-04-26

## 2021-04-30 ENCOUNTER — LABORATORY RESULT (OUTPATIENT)
Age: 61
End: 2021-04-30

## 2021-04-30 DIAGNOSIS — Z86.73 PERSONAL HISTORY OF TRANSIENT ISCHEMIC ATTACK (TIA), AND CEREBRAL INFARCTION W/OUT RESIDUAL DEFICITS: ICD-10-CM

## 2021-04-30 DIAGNOSIS — Z86.79 PERSONAL HISTORY OF OTHER DISEASES OF THE CIRCULATORY SYSTEM: ICD-10-CM

## 2021-04-30 DIAGNOSIS — I25.2 OLD MYOCARDIAL INFARCTION: ICD-10-CM

## 2021-04-30 PROBLEM — Z82.49 FAMILY HISTORY OF HYPERTENSION: Status: ACTIVE | Noted: 2021-04-30

## 2021-04-30 PROBLEM — Z87.898 HISTORY OF CRACK COCAINE USE: Status: ACTIVE | Noted: 2021-04-30

## 2021-04-30 PROBLEM — Z87.898 HISTORY OF MARIJUANA USE: Status: ACTIVE | Noted: 2021-04-30

## 2021-04-30 PROBLEM — Z87.898 FORMER CONSUMPTION OF ALCOHOL: Status: ACTIVE | Noted: 2021-04-30

## 2021-04-30 RX ORDER — ISOSORBIDE DINITRATE 10 MG/1
10 TABLET ORAL EVERY 8 HOURS
Refills: 0 | Status: COMPLETED | COMMUNITY
End: 2021-04-30

## 2021-04-30 RX ORDER — HYDRALAZINE HYDROCHLORIDE 25 MG/1
25 TABLET ORAL EVERY 8 HOURS
Refills: 0 | Status: ACTIVE | COMMUNITY

## 2021-04-30 RX ORDER — ATORVASTATIN CALCIUM 40 MG/1
40 TABLET, FILM COATED ORAL
Refills: 0 | Status: ACTIVE | COMMUNITY

## 2021-04-30 RX ORDER — CEPHALEXIN 500 MG/1
500 CAPSULE ORAL EVERY 8 HOURS
Refills: 0 | Status: COMPLETED | COMMUNITY
End: 2021-04-30

## 2021-04-30 RX ORDER — ASPIRIN 81 MG/1
81 TABLET, FILM COATED ORAL DAILY
Refills: 0 | Status: ACTIVE | COMMUNITY

## 2021-04-30 RX ORDER — ISOSORBIDE MONONITRATE 60 MG
60 TABLET, EXTENDED RELEASE 24 HR ORAL DAILY
Refills: 0 | Status: COMPLETED | COMMUNITY
End: 2021-04-30

## 2021-04-30 RX ORDER — CLOPIDOGREL 75 MG/1
75 TABLET, FILM COATED ORAL DAILY
Refills: 0 | Status: COMPLETED | COMMUNITY
End: 2021-04-30

## 2021-04-30 RX ORDER — CLONIDINE HYDROCHLORIDE 0.2 MG/1
0.2 TABLET ORAL DAILY
Refills: 0 | Status: COMPLETED | COMMUNITY
End: 2021-04-30

## 2021-05-02 ENCOUNTER — FORM ENCOUNTER (OUTPATIENT)
Age: 61
End: 2021-05-02

## 2021-05-03 ENCOUNTER — OUTPATIENT (OUTPATIENT)
Dept: OUTPATIENT SERVICES | Facility: HOSPITAL | Age: 61
LOS: 1 days | End: 2021-05-03
Payer: COMMERCIAL

## 2021-05-03 ENCOUNTER — APPOINTMENT (OUTPATIENT)
Dept: CARDIOTHORACIC SURGERY | Facility: CLINIC | Age: 61
End: 2021-05-03
Payer: COMMERCIAL

## 2021-05-03 VITALS
HEART RATE: 81 BPM | RESPIRATION RATE: 17 BRPM | HEIGHT: 72 IN | BODY MASS INDEX: 28.71 KG/M2 | OXYGEN SATURATION: 98 % | WEIGHT: 212 LBS | TEMPERATURE: 98.3 F | DIASTOLIC BLOOD PRESSURE: 80 MMHG | SYSTOLIC BLOOD PRESSURE: 148 MMHG

## 2021-05-03 VITALS
SYSTOLIC BLOOD PRESSURE: 148 MMHG | TEMPERATURE: 98.3 F | WEIGHT: 212 LBS | HEART RATE: 81 BPM | OXYGEN SATURATION: 98 % | BODY MASS INDEX: 28.71 KG/M2 | DIASTOLIC BLOOD PRESSURE: 80 MMHG | HEIGHT: 72 IN | RESPIRATION RATE: 17 BRPM

## 2021-05-03 DIAGNOSIS — G45.9 TRANSIENT CEREBRAL ISCHEMIC ATTACK, UNSPECIFIED: ICD-10-CM

## 2021-05-03 DIAGNOSIS — Z87.898 PERSONAL HISTORY OF OTHER SPECIFIED CONDITIONS: ICD-10-CM

## 2021-05-03 DIAGNOSIS — Z82.49 FAMILY HISTORY OF ISCHEMIC HEART DISEASE AND OTHER DISEASES OF THE CIRCULATORY SYSTEM: ICD-10-CM

## 2021-05-03 DIAGNOSIS — Z87.01 PERSONAL HISTORY OF PNEUMONIA (RECURRENT): ICD-10-CM

## 2021-05-03 DIAGNOSIS — I35.0 NONRHEUMATIC AORTIC (VALVE) STENOSIS: ICD-10-CM

## 2021-05-03 LAB
A1C WITH ESTIMATED AVERAGE GLUCOSE RESULT: 5.9 % — HIGH (ref 4–5.6)
ALBUMIN SERPL ELPH-MCNC: 4.3 G/DL — SIGNIFICANT CHANGE UP (ref 3.3–5)
ALP SERPL-CCNC: 54 U/L — SIGNIFICANT CHANGE UP (ref 40–120)
ALT FLD-CCNC: 21 U/L — SIGNIFICANT CHANGE UP (ref 10–45)
ANION GAP SERPL CALC-SCNC: 12 MMOL/L — SIGNIFICANT CHANGE UP (ref 5–17)
APPEARANCE UR: CLEAR — SIGNIFICANT CHANGE UP
APTT BLD: 30.7 SEC — SIGNIFICANT CHANGE UP (ref 27.5–35.5)
AST SERPL-CCNC: 18 U/L — SIGNIFICANT CHANGE UP (ref 10–40)
BASOPHILS # BLD AUTO: 0.04 K/UL — SIGNIFICANT CHANGE UP (ref 0–0.2)
BASOPHILS NFR BLD AUTO: 0.6 % — SIGNIFICANT CHANGE UP (ref 0–2)
BILIRUB SERPL-MCNC: 0.8 MG/DL — SIGNIFICANT CHANGE UP (ref 0.2–1.2)
BILIRUB UR-MCNC: ABNORMAL
BUN SERPL-MCNC: 14 MG/DL — SIGNIFICANT CHANGE UP (ref 7–23)
CALCIUM SERPL-MCNC: 9.1 MG/DL — SIGNIFICANT CHANGE UP (ref 8.4–10.5)
CHLORIDE SERPL-SCNC: 106 MMOL/L — SIGNIFICANT CHANGE UP (ref 96–108)
CHOLEST SERPL-MCNC: 132 MG/DL — SIGNIFICANT CHANGE UP
CO2 SERPL-SCNC: 21 MMOL/L — LOW (ref 22–31)
COLOR SPEC: YELLOW — SIGNIFICANT CHANGE UP
CREAT SERPL-MCNC: 1.21 MG/DL — SIGNIFICANT CHANGE UP (ref 0.5–1.3)
DIFF PNL FLD: ABNORMAL
EOSINOPHIL # BLD AUTO: 0.15 K/UL — SIGNIFICANT CHANGE UP (ref 0–0.5)
EOSINOPHIL NFR BLD AUTO: 2.2 % — SIGNIFICANT CHANGE UP (ref 0–6)
ESTIMATED AVERAGE GLUCOSE: 123 MG/DL — HIGH (ref 68–114)
GLUCOSE SERPL-MCNC: 94 MG/DL — SIGNIFICANT CHANGE UP (ref 70–99)
GLUCOSE UR QL: NEGATIVE — SIGNIFICANT CHANGE UP
HBV SURFACE AG SER-ACNC: SIGNIFICANT CHANGE UP
HCT VFR BLD CALC: 50.8 % — HIGH (ref 39–50)
HDLC SERPL-MCNC: 48 MG/DL — SIGNIFICANT CHANGE UP
HGB BLD-MCNC: 16.6 G/DL — SIGNIFICANT CHANGE UP (ref 13–17)
HYALINE CASTS # UR AUTO: SIGNIFICANT CHANGE UP /LPF (ref 0–2)
IMM GRANULOCYTES NFR BLD AUTO: 0.3 % — SIGNIFICANT CHANGE UP (ref 0–1.5)
INR BLD: 1.01 — SIGNIFICANT CHANGE UP (ref 0.88–1.16)
KETONES UR-MCNC: NEGATIVE — SIGNIFICANT CHANGE UP
LEUKOCYTE ESTERASE UR-ACNC: NEGATIVE — SIGNIFICANT CHANGE UP
LIPID PNL WITH DIRECT LDL SERPL: 60 MG/DL — SIGNIFICANT CHANGE UP
LYMPHOCYTES # BLD AUTO: 2.14 K/UL — SIGNIFICANT CHANGE UP (ref 1–3.3)
LYMPHOCYTES # BLD AUTO: 31.1 % — SIGNIFICANT CHANGE UP (ref 13–44)
MCHC RBC-ENTMCNC: 28.7 PG — SIGNIFICANT CHANGE UP (ref 27–34)
MCHC RBC-ENTMCNC: 32.7 GM/DL — SIGNIFICANT CHANGE UP (ref 32–36)
MCV RBC AUTO: 87.7 FL — SIGNIFICANT CHANGE UP (ref 80–100)
MONOCYTES # BLD AUTO: 0.3 K/UL — SIGNIFICANT CHANGE UP (ref 0–0.9)
MONOCYTES NFR BLD AUTO: 4.4 % — SIGNIFICANT CHANGE UP (ref 2–14)
NEUTROPHILS # BLD AUTO: 4.22 K/UL — SIGNIFICANT CHANGE UP (ref 1.8–7.4)
NEUTROPHILS NFR BLD AUTO: 61.4 % — SIGNIFICANT CHANGE UP (ref 43–77)
NITRITE UR-MCNC: NEGATIVE — SIGNIFICANT CHANGE UP
NON HDL CHOLESTEROL: 84 MG/DL — SIGNIFICANT CHANGE UP
NRBC # BLD: 0 /100 WBCS — SIGNIFICANT CHANGE UP (ref 0–0)
PH UR: 6 — SIGNIFICANT CHANGE UP (ref 5–8)
PLATELET # BLD AUTO: 214 K/UL — SIGNIFICANT CHANGE UP (ref 150–400)
POTASSIUM SERPL-MCNC: 4.1 MMOL/L — SIGNIFICANT CHANGE UP (ref 3.5–5.3)
POTASSIUM SERPL-SCNC: 4.1 MMOL/L — SIGNIFICANT CHANGE UP (ref 3.5–5.3)
PROT SERPL-MCNC: 7.5 G/DL — SIGNIFICANT CHANGE UP (ref 6–8.3)
PROT UR-MCNC: 100 MG/DL
PROTHROM AB SERPL-ACNC: 12.1 SEC — SIGNIFICANT CHANGE UP (ref 10.6–13.6)
RBC # BLD: 5.79 M/UL — SIGNIFICANT CHANGE UP (ref 4.2–5.8)
RBC # FLD: 15.1 % — HIGH (ref 10.3–14.5)
RBC CASTS # UR COMP ASSIST: < 5 /HPF — SIGNIFICANT CHANGE UP
SODIUM SERPL-SCNC: 139 MMOL/L — SIGNIFICANT CHANGE UP (ref 135–145)
SP GR SPEC: >=1.03 — SIGNIFICANT CHANGE UP (ref 1–1.03)
TRIGL SERPL-MCNC: 120 MG/DL — SIGNIFICANT CHANGE UP
TSH SERPL-MCNC: 0.76 UIU/ML — SIGNIFICANT CHANGE UP (ref 0.27–4.2)
UROBILINOGEN FLD QL: 0.2 E.U./DL — SIGNIFICANT CHANGE UP
WBC # BLD: 6.87 K/UL — SIGNIFICANT CHANGE UP (ref 3.8–10.5)
WBC # FLD AUTO: 6.87 K/UL — SIGNIFICANT CHANGE UP (ref 3.8–10.5)
WBC UR QL: < 5 /HPF — SIGNIFICANT CHANGE UP

## 2021-05-03 PROCEDURE — 86900 BLOOD TYPING SEROLOGIC ABO: CPT

## 2021-05-03 PROCEDURE — 93312 ECHO TRANSESOPHAGEAL: CPT | Mod: 26

## 2021-05-03 PROCEDURE — 36415 COLL VENOUS BLD VENIPUNCTURE: CPT

## 2021-05-03 PROCEDURE — 93005 ELECTROCARDIOGRAM TRACING: CPT

## 2021-05-03 PROCEDURE — 80053 COMPREHEN METABOLIC PANEL: CPT

## 2021-05-03 PROCEDURE — 80061 LIPID PANEL: CPT

## 2021-05-03 PROCEDURE — 83036 HEMOGLOBIN GLYCOSYLATED A1C: CPT

## 2021-05-03 PROCEDURE — 85025 COMPLETE CBC W/AUTO DIFF WBC: CPT

## 2021-05-03 PROCEDURE — 86850 RBC ANTIBODY SCREEN: CPT

## 2021-05-03 PROCEDURE — 84443 ASSAY THYROID STIM HORMONE: CPT

## 2021-05-03 PROCEDURE — 99204 OFFICE O/P NEW MOD 45 MIN: CPT

## 2021-05-03 PROCEDURE — 86901 BLOOD TYPING SEROLOGIC RH(D): CPT

## 2021-05-03 PROCEDURE — 85730 THROMBOPLASTIN TIME PARTIAL: CPT

## 2021-05-03 PROCEDURE — 81001 URINALYSIS AUTO W/SCOPE: CPT

## 2021-05-03 PROCEDURE — 87340 HEPATITIS B SURFACE AG IA: CPT

## 2021-05-03 PROCEDURE — 85610 PROTHROMBIN TIME: CPT

## 2021-05-03 PROCEDURE — 76377 3D RENDER W/INTRP POSTPROCES: CPT | Mod: 26

## 2021-05-03 PROCEDURE — 93312 ECHO TRANSESOPHAGEAL: CPT

## 2021-05-03 PROCEDURE — 99072 ADDL SUPL MATRL&STAF TM PHE: CPT

## 2021-05-04 PROBLEM — Z87.01 HISTORY OF PNEUMONIA: Status: RESOLVED | Noted: 2021-05-04 | Resolved: 2021-05-04

## 2021-05-05 NOTE — CONSULT LETTER
[Dear  ___] : Dear  [unfilled], [Please see my note below.] : Please see my note below. [Sincerely,] : Sincerely, [FreeTextEntry1] : Please find attached our consultation on your patient, LISSA CAI \par \par We take a multidisciplinary team approach to patient care and consider you, the referring physician, an extension of our team. We will maintain an open line of communication with you throughout your patient's treatment course. \par \par It is our commitment to provide your patient with the highest quality of advanced therapeutic options. We thank you for allowing us to participate in the care of your patient.\par \par Please do not hesitate to contact our team with any questions or concerns at [phone].\par   [FreeTextEntry2] : Dr. Gerry Rushing\par [address], [place] [address]\par [phone] [FreeTextEntry3] : Zack Treviño MD, FET\par Attending Surgeon\par Cardiovascular & Thoracic Surgery\par [facility]\par \par [employer]\par

## 2021-05-05 NOTE — ASSESSMENT
[FreeTextEntry1] : 61 year old male, ex-smoker (quit 2 months ago),  with a past medical history of hypertension, hyperlipidemia, CKD stage 3, TIA (2007), CAD s/p NSTEMI (Jan 2019), drug and alcohol misuse (cocaine/marijuana 30 yr hx), recent hospitalization for pneumonia, presents for evaluation and management of his valvular disease and NYHA Class II-III. He is referred by Dr. Gerry Rushing. \par \par Plan: \par I have reviewed the cardiac cath images and echocardiogram images with the patient and discussed the case with Dr. Gerry Rushing. I had a lengthy discussion with the patient regarding his valvular disease and progression. I have discussed the risks, benefits and alternatives to surgery.  I recommended that the patient is a candidate for an aortic valve replacement, possible aortic root replacement.\par \par The patient was educated on various valve options.  I have described the mechanical valve prosthesis which does require Coumadin therapy with a daily pill as well as frequent lab tests. The mechanical valve has excellent longevity and is usually only removed in the cases of infective bacterial prosthetic valve endocarditis or pannus formation. Approximately over 90% of mechanical valves never need to be removed. However, there is a risk with Coumadin, a blood thinner, approximately a 1% thromboembolic risk per year. The On-x mechanical valve was also discussed, which requires a lower Coumadin dosage and INR therapeutic range. \par \par The other valve option is a biological valve. In general, approximately 50% of these need to be removed at approximately 15 years. However, these valves do not require Coumadin therapy and, therefore, do not have the associated risks of the blood thinner. I discussed that with the current use of Transcatheter Aortic Valve Replacement (TAVR), there is a possibility that future replacement of a failing bioprosthetic valve by TAVR may be an option. The Inspira and MagnaEase valves and their morphology fitted for future TAVRs was discussed as well. \par \par The patient has chosen a bioprosthesis. \par \par I have discussed the risks, benefits and alternatives to surgery. I have explained the risks of the surgery, including approximately 2-3% major mortality or morbidity including stroke, infection, bleeding, death, renal failure and heart attack.  All questions were addressed and patient agrees to proceed with surgery. \par \par --the patient is a candidate for an aortic valve replacement, possible aortic root replacement.\par --the patient will require a cta chest for further evaluation of the aortic root. Pt will also be scheduled for a ct head without contrast, carotid doppler, chest xray, prior to his admission.\par --preop labs done and reviewed on [place]. \par --pt will follow up after the rest of his preop testing to finalize surgical procedure. \par --continue current medication regimen.

## 2021-05-05 NOTE — DATA REVIEWED
[FreeTextEntry1] : Echocardiogram 3/12/21: \par Global systolic function: LVEF 55-60%\par Moderate mitral regurgitation \par Moderate aortic regurgitation \par \par Cardiac Cath 4/15/21: \par Left main : angiographically normal \par LAD: significant ectasia noted \par Distal LAD: moderate diffuse disease \par Circumflex: Significant ectasia noted \par Distal Circ: severe diffuse disease \par RCA: Significant ectasia noted

## 2021-05-05 NOTE — PHYSICAL EXAM
[General Appearance - In No Acute Distress] : in no acute distress [Sclera] : the sclera and conjunctiva were normal [Outer Ear] : the ears and nose were normal in appearance [Neck Appearance] : the appearance of the neck was normal [Jugular Venous Distention Increased] : there was no jugular-venous distention [Respiration, Rhythm And Depth] : normal respiratory rhythm and effort [II] : a grade 2 [2+] : left 2+ [Examination Of The Chest] : the chest was normal in appearance [No Abnormalities] : the abdominal aorta was not enlarged and no bruit was heard [Bowel Sounds] : normal bowel sounds [No CVA Tenderness] : no ~M costovertebral angle tenderness [Abnormal Walk] : normal gait [Skin Color & Pigmentation] : normal skin color and pigmentation [No Focal Deficits] : no focal deficits [Oriented To Time, Place, And Person] : oriented to person, place, and time [Right Carotid Bruit] : no bruit heard over the right carotid [Left Carotid Bruit] : no bruit heard over the left carotid [FreeTextEntry1] : deferred

## 2021-05-05 NOTE — HISTORY OF PRESENT ILLNESS
[FreeTextEntry1] : 61 year old male, ex-smoker (quit 2 months ago), with a past medical history of hypertension, hyperlipidemia, CKD stage 3, TIA (2007), CAD s/p NSTEMI (Jan 2019), drug and alcohol misuse (cocaine/marijuana 30 yr hx), recent hospitalization for pneumonia, presents for evaluation and management of his valvular disease and NYHA Class II-III. He is referred by Dr. Gerry Rushing. \par \par Patient was recently hospitalized at [facility] [place] and worsening dyspnea on exertion. Since discharge, he states that he is feeling better. He admits to worsening fatigue and lightheaded over the past couple of months with activities. He is able to ambulate 5 blocks prior to onset of symptoms.\par MICAELA today: \par  1. Mildly reduced left ventricular systolic function. \par  2. Normal right ventricular size. \par  3. Mavbik-br-ypiclpypbx reduced right ventricular systolic function. \par  4. No LA/RA/MARIS/RAA thrombus seen. \par  5. Color flow Doppler reveals evidence of a patent foramen ovale with primarily left to right shunting by \par color doppler. \par  6. Mild-to-moderate mitral regurgitation. \par  7. Fibrocalcific tricuspid aortic valve without significant stenosis. The aortic valve leaflets do not coapt. \par There is severe aortic regurgitation. The vena contracta width is 0.60 cm (severe >0.6 cm). There is diastolic \par flow reversal in the descending aorta supporting severe aortic regurgitation diagnosis. \par  8. The aortic root is moderately dilated. The aortic root measures 4.60 cm at level of the sinuses of\par Valsalva (normal 3.1-3.7 cm for men, 2.7-3.3 cm for women). \par  9. No pericardial effusion.

## 2021-05-05 NOTE — END OF VISIT
[FreeTextEntry3] : \par I, NATALEE BENNETT , am scribing for and in the presence of SALONI HENDERSON the following sections: History of present illness, past Medical/family/surgical/family/social history, review of systems, vital signs, physical exam and disposition.\par

## 2021-05-10 ENCOUNTER — APPOINTMENT (OUTPATIENT)
Dept: ULTRASOUND IMAGING | Facility: HOSPITAL | Age: 61
End: 2021-05-10

## 2021-05-10 ENCOUNTER — APPOINTMENT (OUTPATIENT)
Dept: CT IMAGING | Facility: HOSPITAL | Age: 61
End: 2021-05-10

## 2021-05-26 ENCOUNTER — OUTPATIENT (OUTPATIENT)
Dept: OUTPATIENT SERVICES | Facility: HOSPITAL | Age: 61
LOS: 1 days | End: 2021-05-26
Payer: COMMERCIAL

## 2021-05-26 ENCOUNTER — APPOINTMENT (OUTPATIENT)
Dept: CARDIOTHORACIC SURGERY | Facility: CLINIC | Age: 61
End: 2021-05-26
Payer: COMMERCIAL

## 2021-05-26 VITALS
HEIGHT: 72 IN | TEMPERATURE: 98.5 F | SYSTOLIC BLOOD PRESSURE: 162 MMHG | RESPIRATION RATE: 17 BRPM | HEART RATE: 86 BPM | BODY MASS INDEX: 29.53 KG/M2 | DIASTOLIC BLOOD PRESSURE: 92 MMHG | OXYGEN SATURATION: 96 % | WEIGHT: 218 LBS

## 2021-05-26 DIAGNOSIS — I34.0 NONRHEUMATIC MITRAL (VALVE) INSUFFICIENCY: ICD-10-CM

## 2021-05-26 DIAGNOSIS — I35.1 NONRHEUMATIC AORTIC (VALVE) INSUFFICIENCY: ICD-10-CM

## 2021-05-26 DIAGNOSIS — I25.10 ATHEROSCLEROTIC HEART DISEASE OF NATIVE CORONARY ARTERY W/OUT ANGINA PECTORIS: ICD-10-CM

## 2021-05-26 DIAGNOSIS — Z01.818 ENCOUNTER FOR OTHER PREPROCEDURAL EXAMINATION: ICD-10-CM

## 2021-05-26 DIAGNOSIS — I77.810 THORACIC AORTIC ECTASIA: ICD-10-CM

## 2021-05-26 DIAGNOSIS — I71.2 THORACIC AORTIC ANEURYSM, W/OUT RUPTURE: ICD-10-CM

## 2021-05-26 LAB
A1C WITH ESTIMATED AVERAGE GLUCOSE RESULT: 6 % — HIGH (ref 4–5.6)
ALBUMIN SERPL ELPH-MCNC: 4.3 G/DL — SIGNIFICANT CHANGE UP (ref 3.3–5)
ALP SERPL-CCNC: 53 U/L — SIGNIFICANT CHANGE UP (ref 40–120)
ALT FLD-CCNC: 20 U/L — SIGNIFICANT CHANGE UP (ref 10–45)
ANION GAP SERPL CALC-SCNC: 12 MMOL/L — SIGNIFICANT CHANGE UP (ref 5–17)
APPEARANCE UR: CLEAR — SIGNIFICANT CHANGE UP
APTT BLD: 30.2 SEC — SIGNIFICANT CHANGE UP (ref 27.5–35.5)
AST SERPL-CCNC: 16 U/L — SIGNIFICANT CHANGE UP (ref 10–40)
BACTERIA # UR AUTO: PRESENT /HPF
BASOPHILS # BLD AUTO: 0.03 K/UL — SIGNIFICANT CHANGE UP (ref 0–0.2)
BASOPHILS NFR BLD AUTO: 0.4 % — SIGNIFICANT CHANGE UP (ref 0–2)
BILIRUB SERPL-MCNC: 0.5 MG/DL — SIGNIFICANT CHANGE UP (ref 0.2–1.2)
BILIRUB UR-MCNC: ABNORMAL
BLD GP AB SCN SERPL QL: NEGATIVE — SIGNIFICANT CHANGE UP
BUN SERPL-MCNC: 14 MG/DL — SIGNIFICANT CHANGE UP (ref 7–23)
CALCIUM SERPL-MCNC: 9.4 MG/DL — SIGNIFICANT CHANGE UP (ref 8.4–10.5)
CHLORIDE SERPL-SCNC: 104 MMOL/L — SIGNIFICANT CHANGE UP (ref 96–108)
CHOLEST SERPL-MCNC: 153 MG/DL — SIGNIFICANT CHANGE UP
CO2 SERPL-SCNC: 23 MMOL/L — SIGNIFICANT CHANGE UP (ref 22–31)
COLOR SPEC: YELLOW — SIGNIFICANT CHANGE UP
COMMENT - URINE: SIGNIFICANT CHANGE UP
CREAT SERPL-MCNC: 1.19 MG/DL — SIGNIFICANT CHANGE UP (ref 0.5–1.3)
DIFF PNL FLD: NEGATIVE — SIGNIFICANT CHANGE UP
EOSINOPHIL # BLD AUTO: 0.2 K/UL — SIGNIFICANT CHANGE UP (ref 0–0.5)
EOSINOPHIL NFR BLD AUTO: 2.6 % — SIGNIFICANT CHANGE UP (ref 0–6)
EPI CELLS # UR: SIGNIFICANT CHANGE UP /HPF (ref 0–5)
ESTIMATED AVERAGE GLUCOSE: 126 MG/DL — HIGH (ref 68–114)
GLUCOSE SERPL-MCNC: 100 MG/DL — HIGH (ref 70–99)
GLUCOSE UR QL: NEGATIVE — SIGNIFICANT CHANGE UP
HBV SURFACE AG SER-ACNC: SIGNIFICANT CHANGE UP
HCT VFR BLD CALC: 49.5 % — SIGNIFICANT CHANGE UP (ref 39–50)
HDLC SERPL-MCNC: 47 MG/DL — SIGNIFICANT CHANGE UP
HGB BLD-MCNC: 16.3 G/DL — SIGNIFICANT CHANGE UP (ref 13–17)
HYALINE CASTS # UR AUTO: SIGNIFICANT CHANGE UP /LPF (ref 0–2)
IMM GRANULOCYTES NFR BLD AUTO: 0.3 % — SIGNIFICANT CHANGE UP (ref 0–1.5)
INR BLD: 0.91 — SIGNIFICANT CHANGE UP (ref 0.88–1.16)
KETONES UR-MCNC: NEGATIVE — SIGNIFICANT CHANGE UP
LEUKOCYTE ESTERASE UR-ACNC: NEGATIVE — SIGNIFICANT CHANGE UP
LIPID PNL WITH DIRECT LDL SERPL: 70 MG/DL — SIGNIFICANT CHANGE UP
LYMPHOCYTES # BLD AUTO: 2.08 K/UL — SIGNIFICANT CHANGE UP (ref 1–3.3)
LYMPHOCYTES # BLD AUTO: 26.9 % — SIGNIFICANT CHANGE UP (ref 13–44)
MCHC RBC-ENTMCNC: 28.8 PG — SIGNIFICANT CHANGE UP (ref 27–34)
MCHC RBC-ENTMCNC: 32.9 GM/DL — SIGNIFICANT CHANGE UP (ref 32–36)
MCV RBC AUTO: 87.5 FL — SIGNIFICANT CHANGE UP (ref 80–100)
MONOCYTES # BLD AUTO: 0.35 K/UL — SIGNIFICANT CHANGE UP (ref 0–0.9)
MONOCYTES NFR BLD AUTO: 4.5 % — SIGNIFICANT CHANGE UP (ref 2–14)
NEUTROPHILS # BLD AUTO: 5.06 K/UL — SIGNIFICANT CHANGE UP (ref 1.8–7.4)
NEUTROPHILS NFR BLD AUTO: 65.3 % — SIGNIFICANT CHANGE UP (ref 43–77)
NITRITE UR-MCNC: POSITIVE
NON HDL CHOLESTEROL: 106 MG/DL — SIGNIFICANT CHANGE UP
NRBC # BLD: 0 /100 WBCS — SIGNIFICANT CHANGE UP (ref 0–0)
PH UR: 5.5 — SIGNIFICANT CHANGE UP (ref 5–8)
PLATELET # BLD AUTO: 242 K/UL — SIGNIFICANT CHANGE UP (ref 150–400)
POTASSIUM SERPL-MCNC: 4 MMOL/L — SIGNIFICANT CHANGE UP (ref 3.5–5.3)
POTASSIUM SERPL-SCNC: 4 MMOL/L — SIGNIFICANT CHANGE UP (ref 3.5–5.3)
PROT SERPL-MCNC: 7.6 G/DL — SIGNIFICANT CHANGE UP (ref 6–8.3)
PROT UR-MCNC: 30 MG/DL
PROTHROM AB SERPL-ACNC: 11 SEC — SIGNIFICANT CHANGE UP (ref 10.6–13.6)
RBC # BLD: 5.66 M/UL — SIGNIFICANT CHANGE UP (ref 4.2–5.8)
RBC # FLD: 15.6 % — HIGH (ref 10.3–14.5)
RBC CASTS # UR COMP ASSIST: SIGNIFICANT CHANGE UP /HPF
RH IG SCN BLD-IMP: POSITIVE — SIGNIFICANT CHANGE UP
SODIUM SERPL-SCNC: 139 MMOL/L — SIGNIFICANT CHANGE UP (ref 135–145)
SP GR SPEC: >=1.03 — SIGNIFICANT CHANGE UP (ref 1–1.03)
TRIGL SERPL-MCNC: 182 MG/DL — HIGH
TSH SERPL-MCNC: 0.98 UIU/ML — SIGNIFICANT CHANGE UP (ref 0.27–4.2)
UROBILINOGEN FLD QL: 1 E.U./DL — SIGNIFICANT CHANGE UP
WBC # BLD: 7.74 K/UL — SIGNIFICANT CHANGE UP (ref 3.8–10.5)
WBC # FLD AUTO: 7.74 K/UL — SIGNIFICANT CHANGE UP (ref 3.8–10.5)
WBC UR QL: < 5 /HPF — SIGNIFICANT CHANGE UP

## 2021-05-26 PROCEDURE — 99072 ADDL SUPL MATRL&STAF TM PHE: CPT

## 2021-05-26 PROCEDURE — 99214 OFFICE O/P EST MOD 30 MIN: CPT

## 2021-05-27 PROBLEM — I34.0 MODERATE MITRAL REGURGITATION: Status: ACTIVE | Noted: 2021-05-27

## 2021-05-27 PROBLEM — I71.2 THORACIC AORTIC ANEURYSM, WITHOUT RUPTURE: Status: ACTIVE | Noted: 2021-05-04

## 2021-05-27 PROBLEM — I25.10 CORONARY ARTERY DISEASE: Status: ACTIVE | Noted: 2021-05-27

## 2021-05-27 PROBLEM — I35.1 SEVERE AORTIC INSUFFICIENCY: Status: ACTIVE | Noted: 2021-04-28

## 2021-05-27 PROBLEM — I77.810 DILATED AORTIC ROOT: Status: ACTIVE | Noted: 2021-05-03

## 2021-06-02 NOTE — PROCEDURE
[FreeTextEntry1] : \par Dr. Yan discussed activity restrictions with the patient, and would advise exercise at a moderate amount with no heavy lifting over one third of body weight, and avoiding heart rates that exceed 140 beats per minute. In addition, every patient should abstain from tobacco abuse and to avoid all illicit drug use, especially stimulants such as cocaine or methamphetamine. Dr. Yan also counseled regarding maintaining a healthy heart diet, and losing any excessive weight as this also put undue stress on both the aorta and entire cardiovascular system. First degree family members should be screened for bicuspid valve disease, and ascending aortic aneurysms. \par \par Patient was advised to view the educational video prior to this visit regarding aortic pathology, risk factors, surgical procedures, and lifestyle modifications. Video can be retrieved at [email or web address]/[email or web address].be.\par

## 2021-06-02 NOTE — END OF VISIT
[FreeTextEntry3] : \par I, NATALEE BENNETT , am scribing for and in the presence of DALILA JAY the following sections: History of present illness, past Medical/family/surgical/family/social history, review of systems, vital signs, physical exam and disposition..s\par \par \par \par

## 2021-06-02 NOTE — HISTORY OF PRESENT ILLNESS
[FreeTextEntry1] : 61 year old male, ex-smoker (quit 2 months ago), with a past medical history of hypertension, hyperlipidemia, CKD stage 3, TIA (2007), CAD s/p NSTEMI (Jan 2019), drug and alcohol misuse (cocaine/marijuana 30 yr hx), recent hospitalization for pneumonia and heart failure exacerbation, presents for evaluation and management of his valvular disease and coronary artery disease.  NYHA Class II-III. He is referred by Dr. Gerry Rushing. \par \par Patient was recently hospitalized at [facility] [place] for pneumonia and worsening dyspnea on exertion. Since discharge, he states that he is feeling better. He admits to worsening fatigue and lightheaded over the past couple of months with activities. He is able to ambulate 5 blocks prior to onset of symptoms.\par \par Patient underwent cardiac workup which revealed severe AI. He was evaluated by Dr. Treviño on 5/3/21 and was recommended to undergo a cta chest to evaluate thoracic aortic aneurysm. Patient returns today for surgical discussion. \par \par Cardiac Cath 4/15/21: \par Left main : angiographically normal \par LAD: significant ectasia noted \par Distal LAD: moderate diffuse disease \par Circumflex: Significant ectasia noted \par Distal Circ: severe diffuse disease \par RCA: Significant ectasia noted. \par \par MICAELA 5/3/21: \par  1. Mildly reduced left ventricular systolic function. \par  2. Normal right ventricular size. \par  3. Tsfdxj-ak-yeaqlvhyvz reduced right ventricular systolic function. \par  4. No LA/RA/MARIS/RAA thrombus seen. \par  5. Color flow Doppler reveals evidence of a patent foramen ovale with primarily left to right shunting by \par color doppler. \par  6. Mild-to-moderate mitral regurgitation. \par  7. Fibrocalcific tricuspid aortic valve without significant stenosis. The aortic valve leaflets do not coapt. \par There is severe aortic regurgitation. The vena contracta width is 0.60 cm (severe >0.6 cm). There is diastolic flow reversal in the descending aorta supporting severe aortic regurgitation diagnosis. \par  8. The aortic root is moderately dilated. The aortic root measures 4.60 cm at level of the sinuses of\par Valsalva (normal 3.1-3.7 cm for men, 2.7-3.3 cm for women). \par  9. No pericardial effusion. \par \par CTA chest 5/12/21: aortic root 4.7cm. ascending aorta 4.5cm. incidental finding of distal left atrial appendage incomplete enhancement vs early thrombus formation.

## 2021-06-02 NOTE — ASSESSMENT
[FreeTextEntry1] : 61 year old male, ex-smoker (quit 2 months ago), with a past medical history of hypertension, hyperlipidemia, CKD stage 3, TIA (2007), CAD s/p NSTEMI (Jan 2019), drug and alcohol misuse (cocaine/marijuana 30 yr hx), recent hospitalization for pneumonia and heart failure exacerbation, presents for evaluation and management of his valvular disease and coronary artery disease.  NYHA Class II-III.\par \par Plan:\par \par I have discussed the indications for surgery which include: decrease ejection fraction and worsening aortic stenosis on echocardiogram, signs and symptoms of congestive heart failure, and other necessary cardiac procedures such as coronary artery bypass grafting.\par \par Given his symptoms of shortness of breath, recent admission for diastolic congestive heart failure and his  severe aortic regurgitation with moderate mitral regurgitation, he meets those indications. \par \par I had a lengthy discussion with the patient regarding his valvular/aneurysmal/coronary artery disease and progression. I have recommended that the patient is a candidate for aortic valve replacement, mitral valve repair/placement, possible coronary artery bypass grafting, aortic root and ascending aorta replacement.  \par \par The patient was educated on various valve options.  I have described the mechanical valve prosthesis which does require Coumadin therapy with a daily pill as well as frequent lab tests. The mechanical valve has excellent longevity and is usually only removed in the cases of infective bacterial prosthetic valve endocarditis or pannus formation. Approximately over 90% of mechanical valves never need to be removed. However, there is a risk with Coumadin, a blood thinner, approximately a 1% thromboembolic risk per year. The On-x mechanical valve was also discussed, which requires a lower Coumadin dosage and INR therapeutic range. \par \par The other valve option is a biological valve. In general, approximately 50% of these need to be removed at approximately 15 years. However, these valves do not require Coumadin therapy and, therefore, do not have the associated risks of the blood thinner. I discussed that with the current use of Transcatheter Aortic Valve Replacement (TAVR), there is a possibility that future replacement of a failing bioprosthetic valve by TAVR may be an option. The Inspira and MagnaEase valves and their morphology fitted for future TAVRs was discussed as well. \par \par The patient has chosen a bioprosthesis. \par \par I have discussed the risks, benefits and alternatives to surgery. I have explained the risks of the surgery, including approximately 1-2% major mortality or morbidity including stroke, infection, bleeding, death, renal failure and heart attack. There is a 2 - 4% risk of requiring a PPM  in patients with aortic regurgitation and a 4-6% risk for patients with aortic stenosis. There is a 20% risk of temporary atrial fibrillation post surgery. All questions were addressed and patient agrees to proceed with surgery. \par \par \par --patient has been vaccinated. does not require covid swab prior to surgery. (he is scheduled for his 2nd dose on Saturday 5/29 and will send proof of vaccination once completed ). \par --the patient is a candidate for aortic valve replacement, mitral valve repair/placement, possible coronary artery bypass grafting, aortic root and ascending aorta replacement. Surgery is scheduled for 6/7/21. \par --preop labs done today and reviewed on HIE. \par --continue current medication regimen. \par \par

## 2021-06-02 NOTE — PHYSICAL EXAM
[Sclera] : the sclera and conjunctiva were normal [Neck Appearance] : the appearance of the neck was normal [Jugular Venous Distention Increased] : there was no jugular-venous distention [Respiration, Rhythm And Depth] : normal respiratory rhythm and effort [Examination Of The Chest] : the chest was normal in appearance [Bowel Sounds] : normal bowel sounds [No CVA Tenderness] : no ~M costovertebral angle tenderness [Abnormal Walk] : normal gait [Skin Color & Pigmentation] : normal skin color and pigmentation [No Focal Deficits] : no focal deficits [Oriented To Time, Place, And Person] : oriented to person, place, and time [General Appearance - In No Acute Distress] : in no acute distress [Outer Ear] : the ears and nose were normal in appearance [II] : a grade 2 [2+] : left 2+ [No Abnormalities] : the abdominal aorta was not enlarged and no bruit was heard [Heart Rate And Rhythm] : heart rate was normal and rhythm regular [FreeTextEntry1] : deferred [Right Carotid Bruit] : no bruit heard over the right carotid [Left Carotid Bruit] : no bruit heard over the left carotid

## 2021-06-02 NOTE — CONSULT LETTER
[Dear  ___] : Dear  [unfilled], [Please see my note below.] : Please see my note below. [FreeTextEntry2] : Dr. Gerry Rushing\par [address] floor, \[place] [address] [FreeTextEntry1] : Please find attached our consultation on your patient, Mr. LISSA CAI . \par \par We take a multidisciplinary team approach to patient care and consider you, the referring physician, an extension of our team. We will maintain an open line of communication with you throughout your patient's treatment course.  \par \par It is our commitment to provide your patient with the highest quality of advanced therapeutic options. We thank you for allowing us to participate in the care of your patient.\par \par Please do not hesitate to contact our team with any questions or concerns at [phone]. \par \par  [FreeTextEntry3] : Sincerely, \par \par \par \par \par \par Milton Yan M.D.\par Professor of Cardiovascular and Thoracic Surgery\par Minimally Invasive Valve Surgeon\par Director of Aortic Surgery, [facility]\par Cell: [phone]\par Email: [email or web address] \par \par [facility]:\par [address], [address], [place] [address]\par Office: [phone]\par Fax: [phone]\par \par [facility]:\par Department of Cardiovascular and Thoracic Surgery\par [address] [place] [address], [address]\par Office: [phone]\par Fax: [phone]\par \par Practice Manager: Ms. Radhika Vidal\par Email: [email or web address]\par Phone: [phone]\par \par \par \par

## 2021-06-03 NOTE — H&P ADULT - HISTORY OF PRESENT ILLNESS
61 year old male, ex-smoker (quit 2 months ago), with a past medical history of hypertension, hyperlipidemia, CKD stage 3, TIA (2007), CAD s/p NSTEMI (Jan 2019), drug and alcohol misuse (cocaine/marijuana 30 yr hx), recent hospitalization for pneumonia and heart failure exacerbation, presents for evaluation and management of his valvular disease and coronary artery disease. NYHA Class II-III. He is referred by Dr. Gerry Rushing.     Patient was recently hospitalized at [facility] [place] for pneumonia and worsening dyspnea on exertion. Since discharge, he states that he is feeling better. He admits to worsening fatigue and lightheaded over the past couple of months with activities. He is able to ambulate 5 blocks prior to onset of symptoms.    Patient underwent cardiac workup which revealed severe AI. He was evaluated by Dr. Treviño on 5/3/21 and was recommended to undergo a cta chest to evaluate thoracic aortic aneurysm. Patient returns today for surgical discussion.     Cardiac Cath 4/15/21:   Left main: angiographically normal   LAD: significant ectasia noted   Distal LAD: moderate diffuse disease   Circumflex: Significant ectasia noted   Distal Circ: severe diffuse disease   RCA: Significant ectasia noted.     MICAELA 5/3/21:    1. Mildly reduced left ventricular systolic function.    2. Normal right ventricular size.    3. Jlwhag-iw-ytgheboaiv reduced right ventricular systolic function.    4. No LA/RA/MARIS/RAA thrombus seen.    5. Color flow Doppler reveals evidence of a patent foramen ovale with primarily left to right shunting by   color doppler.    6. Mild-to-moderate mitral regurgitation.    7. Fibrocalcific tricuspid aortic valve without significant stenosis. The aortic valve leaflets do not coapt.   There is severe aortic regurgitation. The vena contracta width is 0.60 cm (severe >0.6 cm). There is diastolic flow reversal in the descending aorta supporting severe aortic regurgitation diagnosis.    8. The aortic root is moderately dilated. The aortic root measures 4.60 cm at level of the sinuses of  Valsalva (normal 3.1-3.7 cm for men, 2.7-3.3 cm for women).    9. No pericardial effusion.     CTA chest 5/12/21: aortic root 4.7cm. ascending aorta 4.5cm. incidental finding of distal left atrial appendage incomplete enhancement vs early thrombus formation.     The patient was evaluated by CT surgery and is deemed a surgical candidate for aortic valve replacement, mitral valve repair/placement, possible coronary artery bypass grafting, aortic root and ascending aorta replacement. 61 year old male, ex-smoker (quit 2 months ago), with a past medical history of hypertension, hyperlipidemia, CKD stage 3, TIA (2007), CAD s/p NSTEMI (Jan 2019), drug and alcohol misuse (cocaine/marijuana 30 yr hx), recent hospitalization for pneumonia and heart failure exacerbation, presents for evaluation and management of his valvular disease, dilated aorta, and coronary artery disease. NYHA Class II-III. He is referred by Dr. Gerry Rushing.     Patient was recently hospitalized at [facility] [place] for pneumonia and worsening dyspnea on exertion. Since discharge, he states that he is feeling better. He admits to worsening fatigue and lightheaded over the past couple of months with activities. He is able to ambulate 5 blocks prior to onset of symptoms.    Patient underwent cardiac workup which revealed severe AI. He was evaluated by Dr. Treviño on 5/3/21 and was recommended to undergo a cta chest to evaluate thoracic aortic aneurysm. Patient returns today for surgical discussion.     Cardiac Cath 4/15/21:   Left main: angiographically normal   LAD: significant ectasia noted   Distal LAD: moderate diffuse disease   Circumflex: Significant ectasia noted   Distal Circ: severe diffuse disease   RCA: Significant ectasia noted.     MICAELA 5/3/21:    1. Mildly reduced left ventricular systolic function.    2. Normal right ventricular size.    3. Aohcus-yr-voannyybom reduced right ventricular systolic function.    4. No LA/RA/MARIS/RAA thrombus seen.    5. Color flow Doppler reveals evidence of a patent foramen ovale with primarily left to right shunting by   color doppler.    6. Mild-to-moderate mitral regurgitation.    7. Fibrocalcific tricuspid aortic valve without significant stenosis. The aortic valve leaflets do not coapt.   There is severe aortic regurgitation. The vena contracta width is 0.60 cm (severe >0.6 cm). There is diastolic flow reversal in the descending aorta supporting severe aortic regurgitation diagnosis.    8. The aortic root is moderately dilated. The aortic root measures 4.60 cm at level of the sinuses of  Valsalva (normal 3.1-3.7 cm for men, 2.7-3.3 cm for women).    9. No pericardial effusion.     CTA chest 5/12/21: aortic root 4.7cm. ascending aorta 4.5cm. incidental finding of distal left atrial appendage incomplete enhancement vs early thrombus formation.     The patient was evaluated by CT surgery and is deemed a surgical candidate for aortic valve replacement, mitral valve repair/placement, possible coronary artery bypass grafting, aortic root and ascending aorta replacement. 61 year old male, ex-smoker (quit 2 months ago), with a past medical history of hypertension, hyperlipidemia, CKD stage 3, TIA (2007), CAD s/p NSTEMI (Jan 2019), drug and alcohol misuse (cocaine/marijuana 30 yr hx), recent hospitalization for pneumonia and heart failure exacerbation, presents for evaluation and management of his valvular disease, dilated aorta, and coronary artery disease. NYHA Class II-III. He is referred by Dr. Gerry Rushing.     Patient was recently hospitalized at [facility] [place] for pneumonia and worsening dyspnea on exertion. Since discharge, he states that he is feeling better. He admits to worsening fatigue and lightheaded over the past couple of months with activities. He is able to ambulate 5 blocks prior to onset of symptoms.    Patient underwent cardiac workup which revealed severe AI. He was evaluated by Dr. Treviño on 5/3/21 and was recommended to undergo a cta chest to evaluate thoracic aortic aneurysm. Patient returns today for surgical discussion.     Cardiac Cath 4/15/21:   Left main: angiographically normal   LAD: significant ectasia noted   Distal LAD: moderate diffuse disease   Circumflex: Significant ectasia noted   Distal Circ: severe diffuse disease   RCA: Significant ectasia noted.     MICAELA 5/3/21:    1. Mildly reduced left ventricular systolic function.    2. Normal right ventricular size.    3. Mrgwkc-ov-opiwlhtxkp reduced right ventricular systolic function.    4. No LA/RA/MARIS/RAA thrombus seen.    5. Color flow Doppler reveals evidence of a patent foramen ovale with primarily left to right shunting by   color doppler.    6. Mild-to-moderate mitral regurgitation.    7. Fibrocalcific tricuspid aortic valve without significant stenosis. The aortic valve leaflets do not coapt.   There is severe aortic regurgitation. The vena contracta width is 0.60 cm (severe >0.6 cm). There is diastolic flow reversal in the descending aorta supporting severe aortic regurgitation diagnosis.    8. The aortic root is moderately dilated. The aortic root measures 4.60 cm at level of the sinuses of  Valsalva (normal 3.1-3.7 cm for men, 2.7-3.3 cm for women).    9. No pericardial effusion.     CTA chest 5/12/21: aortic root 4.7cm. ascending aorta 4.5cm. incidental finding of distal left atrial appendage incomplete enhancement vs early thrombus formation.     The patient was evaluated by CT surgery and is deemed a surgical candidate for aortic valve replacement, mitral valve repair/placement, possible coronary artery bypass grafting, aortic root and ascending aorta replacement.    Patient seen in same day holding area; Reports no changes to PMHx or medications since last seen by our team. Denies acute or current SOB, chest pain, palpitation, N/V/D, fever/chills, recent illness, or any other concerning symptoms. Pt reports nothing to eat or drink since last night. Pt last took his beta blocker this morning

## 2021-06-03 NOTE — H&P ADULT - ASSESSMENT
Dilated aortic root (447.71) (I77.810)  Severe aortic insufficiency (424.1) (I35.1)  Thoracic aortic aneurysm, without rupture (441.2) (I71.2)  Coronary artery disease (414.00) (I25.10)  Moderate mitral regurgitation (424.0) (I34.0)    61 year old male, ex-smoker (quit 2 months ago), with a past medical history of hypertension, hyperlipidemia, CKD stage 3, TIA (2007), CAD s/p NSTEMI (Jan 2019), drug and alcohol misuse (cocaine/marijuana 30 yr hx), recent hospitalization for pneumonia and heart failure exacerbation, presents for evaluation and management of his valvular disease and coronary artery disease. NYHA Class II-III.    Plan:    I have discussed the indications for surgery which include: decrease ejection fraction and worsening aortic stenosis on echocardiogram, signs and symptoms of congestive heart failure, and other necessary cardiac procedures such as coronary artery bypass grafting.    Given his symptoms of shortness of breath, recent admission for diastolic congestive heart failure and his severe aortic regurgitation with moderate mitral regurgitation, he meets those indications.     I had a lengthy discussion with the patient regarding his valvular/aneurysmal/coronary artery disease and progression. I have recommended that the patient is a candidate for aortic valve replacement, mitral valve repair/placement, possible coronary artery bypass grafting, aortic root and ascending aorta replacement.     The patient was educated on various valve options. I have described the mechanical valve prosthesis which does require Coumadin therapy with a daily pill as well as frequent lab tests. The mechanical valve has excellent longevity and is usually only removed in the cases of infective bacterial prosthetic valve endocarditis or pannus formation. Approximately over 90% of mechanical valves never need to be removed. However, there is a risk with Coumadin, a blood thinner, approximately a 1% thromboembolic risk per year. The On-x mechanical valve was also discussed, which requires a lower Coumadin dosage and INR therapeutic range.     The other valve option is a biological valve. In general, approximately 50% of these need to be removed at approximately 15 years. However, these valves do not require Coumadin therapy and, therefore, do not have the associated risks of the blood thinner. I discussed that with the current use of Transcatheter Aortic Valve Replacement (TAVR), there is a possibility that future replacement of a failing bioprosthetic valve by TAVR may be an option. The Inspira and MagnaEase valves and their morphology fitted for future TAVRs was discussed as well.     The patient has chosen a bioprosthesis.     I have discussed the risks, benefits and alternatives to surgery. I have explained the risks of the surgery, including approximately 1-2% major mortality or morbidity including stroke, infection, bleeding, death, renal failure and heart attack. There is a 2 - 4% risk of requiring a PPM in patients with aortic regurgitation and a 4-6% risk for patients with aortic stenosis. There is a 20% risk of temporary atrial fibrillation post surgery. All questions were addressed and patient agrees to proceed with surgery.       --patient has been fully vaccinated against covid. Pfizer 5/8/21 and 5/29/21  --the patient is a candidate for aortic valve replacement, mitral valve repair/placement, possible coronary artery bypass grafting, aortic root and ascending aorta replacement. Surgery is scheduled for 6/7/21.   --preop instructions and antibacterial soaps x 3 provided.

## 2021-06-03 NOTE — H&P ADULT - NSHPSOCIALHISTORY_GEN_ALL_CORE
recently quit smoking   Former consumption of alcohol (V11.3) (Z87.898)  History of crack cocaine use (305.63) (Z87.898)   History of marijuana use (305.23) (Z87.898)

## 2021-06-03 NOTE — H&P ADULT - NSHPPOAURINARYCATHETER_GEN_ALL_CORE
Patient discharged ambulatory in stable condition with all belongings to home.  AVS printed and reviewed with the patient.  Patient reminded to keep her upcoming KRYSTIAN with Han RIVERS on 10/31/2019.  She verbalized understanding of this and all other discharge instructions and teaching.  She had no further questions or concerns upon leaving [facility].   no

## 2021-06-03 NOTE — H&P ADULT - NSICDXPASTMEDICALHX_GEN_ALL_CORE_FT
PAST MEDICAL HISTORY:  Aortic aneurysm     Aortic insufficiency     Aortic regurgitation     CAD (coronary artery disease)     CKD (chronic kidney disease)     Hyperlipidemia     Hypertension     Mitral regurgitation     NSTEMI (non-ST elevation myocardial infarction)     Thoracic aortic aneurysm     TIA (transient ischemic attack)

## 2021-06-03 NOTE — H&P ADULT - NSICDXFAMILYHX_GEN_ALL_CORE_FT
FAMILY HISTORY:  Mother  Still living? Unknown  FH: hypertension, Age at diagnosis: Age Unknown    Sibling  Still living? Unknown  FH: hypertension, Age at diagnosis: Age Unknown

## 2021-06-03 NOTE — H&P ADULT - NSHPPHYSICALEXAM_GEN_ALL_CORE
Vital Signs   	Recorded: [id number] 12:35PM  Systolic	162  Diastolic	92  Height	6 ft   Weight	218 lb   BMI Calculated	29.57 kg/m2  BSA Calculated	2.21  Temperature	98.5 F  Heart Rate	86  Respiration	17  O2 Saturation	96    Physical Exam  Eyes: the sclera and conjunctiva were normal.   Neck: the appearance of the neck was normal . there was no jugular-venous distention.   Pulmonary: normal respiratory rhythm and effort.   Heart: heart rate was normal and rhythm regular.   Chest: the chest was normal in appearance.   Vascular:   Abdominal Aorta: the abdominal aorta was not enlarged and no bruit was heard.   Breasts:. deferred.   Abdomen: normal bowel sounds.   Genitourinary:. deferred.   Lymphatics: deferred.   Back: no costovertebral angle tenderness.   Musculoskeletal: normal gait.   Skin: normal skin color and pigmentation.   Neurological: no focal deficits.   Psychiatric: oriented to person, place, and time.   Constitutional: in no acute distress.   ENT: the ears and nose were normal in appearance.   Cardiovascular:   A grade 2 diastolic murmur was heard at the RUSB. Carotid: no bruit heard over the right carotid and no bruit heard over the left carotid. Radial: right 2+ and left 2+.   Abdominal Aorta: the abdominal aorta was not enlarged and no bruit was heard.

## 2021-06-03 NOTE — H&P ADULT - NSHPLABSRESULTS_GEN_ALL_CORE
Echocardiogram 3/12/21:   Global systolic function: LVEF 55-60%  Moderate mitral regurgitation   Moderate aortic regurgitation     Carotid doppler 5/12/21  carotid arterial atherosclerotic disease without hemodynamically significant stenosis <50%    CT head w/o contrast 5/12/21  mild/moderate cortical atrophy and moderate severe microvascular ischemic white matter demyelination  no acute intracranial pathology or intracranial hemorrhage            Labs from 5/26/21                               16.3  	5.66   )-----------( 242    	           49.5  	    	139  | 104 |  14  	----------------------------<  100  	4.0   |  23  |  1.19                TSH 0.981                             Corey 9.4    	TPro  7.6  /  Alb  4.3  /  TBili  0.5 /   AST 16  /  ALT  20  /  AlkPhos  53                 Hemoglobin A1C 6.0

## 2021-06-03 NOTE — H&P ADULT - REASON FOR ADMISSION
aortic valve disease and coronary artery disease aortic valve disease, thoracic aortic aneurysm, and coronary artery disease aortic regurgitation, mitral regurgitation, thoracic aortic aneurysm, and coronary artery disease

## 2021-06-04 VITALS
RESPIRATION RATE: 18 BRPM | SYSTOLIC BLOOD PRESSURE: 138 MMHG | HEIGHT: 72 IN | WEIGHT: 218.04 LBS | TEMPERATURE: 97 F | OXYGEN SATURATION: 99 % | DIASTOLIC BLOOD PRESSURE: 84 MMHG | HEART RATE: 75 BPM

## 2021-06-04 RX ORDER — HYDRALAZINE HCL 50 MG
0 TABLET ORAL
Qty: 0 | Refills: 0 | DISCHARGE

## 2021-06-04 RX ORDER — LOSARTAN POTASSIUM 100 MG/1
1 TABLET, FILM COATED ORAL
Qty: 0 | Refills: 0 | DISCHARGE

## 2021-06-04 RX ORDER — ATORVASTATIN CALCIUM 80 MG/1
1 TABLET, FILM COATED ORAL
Qty: 0 | Refills: 0 | DISCHARGE

## 2021-06-04 NOTE — PRE-OP CHECKLIST - SELECT TESTS ORDERED
TTE, CT HEAD, Carotid Ultrasound, MICAELA, ECHO, CT Angiography Chest, Cardiac Cath,/CBC/CMP/PT/PTT/INR/Type and Screen/Urinalysis/EKG/CXR

## 2021-06-04 NOTE — PATIENT PROFILE ADULT - STATED REASON FOR ADMISSION
Aortic Valve Replacement, Mitral Valve Repair/Replacemcemt, Possible Coronary Artery Bypass Grafting, Aortic Root and Ascending Aorta Replacement.

## 2021-06-06 ENCOUNTER — TRANSCRIPTION ENCOUNTER (OUTPATIENT)
Age: 61
End: 2021-06-06

## 2021-06-07 ENCOUNTER — APPOINTMENT (OUTPATIENT)
Dept: CARDIOTHORACIC SURGERY | Facility: HOSPITAL | Age: 61
End: 2021-06-07

## 2021-06-07 ENCOUNTER — INPATIENT (INPATIENT)
Facility: HOSPITAL | Age: 61
LOS: 10 days | Discharge: NURSING, REHAB OR LONG-TERM CARE | DRG: 219 | End: 2021-06-18
Attending: THORACIC SURGERY (CARDIOTHORACIC VASCULAR SURGERY) | Admitting: THORACIC SURGERY (CARDIOTHORACIC VASCULAR SURGERY)
Payer: COMMERCIAL

## 2021-06-07 LAB
ALBUMIN SERPL ELPH-MCNC: 2.8 G/DL — LOW (ref 3.3–5)
ALBUMIN SERPL ELPH-MCNC: 3.2 G/DL — LOW (ref 3.3–5)
ALBUMIN SERPL ELPH-MCNC: 3.4 G/DL — SIGNIFICANT CHANGE UP (ref 3.3–5)
ALP SERPL-CCNC: 32 U/L — LOW (ref 40–120)
ALP SERPL-CCNC: 33 U/L — LOW (ref 40–120)
ALP SERPL-CCNC: 33 U/L — LOW (ref 40–120)
ALT FLD-CCNC: 11 U/L — SIGNIFICANT CHANGE UP (ref 10–45)
ALT FLD-CCNC: 12 U/L — SIGNIFICANT CHANGE UP (ref 10–45)
ALT FLD-CCNC: 12 U/L — SIGNIFICANT CHANGE UP (ref 10–45)
ANION GAP SERPL CALC-SCNC: 11 MMOL/L — SIGNIFICANT CHANGE UP (ref 5–17)
ANION GAP SERPL CALC-SCNC: 6 MMOL/L — SIGNIFICANT CHANGE UP (ref 5–17)
ANION GAP SERPL CALC-SCNC: 9 MMOL/L — SIGNIFICANT CHANGE UP (ref 5–17)
APTT BLD: 27 SEC — LOW (ref 27.5–35.5)
APTT BLD: 29.3 SEC — SIGNIFICANT CHANGE UP (ref 27.5–35.5)
APTT BLD: 29.8 SEC — SIGNIFICANT CHANGE UP (ref 27.5–35.5)
AST SERPL-CCNC: 34 U/L — SIGNIFICANT CHANGE UP (ref 10–40)
AST SERPL-CCNC: 35 U/L — SIGNIFICANT CHANGE UP (ref 10–40)
AST SERPL-CCNC: 40 U/L — SIGNIFICANT CHANGE UP (ref 10–40)
BASE EXCESS BLDV CALC-SCNC: -2 MMOL/L — SIGNIFICANT CHANGE UP (ref -2–3)
BASE EXCESS BLDV CALC-SCNC: -2.8 MMOL/L — LOW (ref -2–3)
BASOPHILS # BLD AUTO: 0 K/UL — SIGNIFICANT CHANGE UP (ref 0–0.2)
BASOPHILS NFR BLD AUTO: 0 % — SIGNIFICANT CHANGE UP (ref 0–2)
BILIRUB SERPL-MCNC: 0.5 MG/DL — SIGNIFICANT CHANGE UP (ref 0.2–1.2)
BILIRUB SERPL-MCNC: 0.6 MG/DL — SIGNIFICANT CHANGE UP (ref 0.2–1.2)
BILIRUB SERPL-MCNC: 0.8 MG/DL — SIGNIFICANT CHANGE UP (ref 0.2–1.2)
BUN SERPL-MCNC: 17 MG/DL — SIGNIFICANT CHANGE UP (ref 7–23)
BUN SERPL-MCNC: 19 MG/DL — SIGNIFICANT CHANGE UP (ref 7–23)
BUN SERPL-MCNC: 20 MG/DL — SIGNIFICANT CHANGE UP (ref 7–23)
CA-I SERPL-SCNC: 1.15 MMOL/L — SIGNIFICANT CHANGE UP (ref 1.15–1.33)
CA-I SERPL-SCNC: 1.26 MMOL/L — SIGNIFICANT CHANGE UP (ref 1.15–1.33)
CALCIUM SERPL-MCNC: 8 MG/DL — LOW (ref 8.4–10.5)
CALCIUM SERPL-MCNC: 8 MG/DL — LOW (ref 8.4–10.5)
CALCIUM SERPL-MCNC: 8.6 MG/DL — SIGNIFICANT CHANGE UP (ref 8.4–10.5)
CHLORIDE SERPL-SCNC: 108 MMOL/L — SIGNIFICANT CHANGE UP (ref 96–108)
CHLORIDE SERPL-SCNC: 112 MMOL/L — HIGH (ref 96–108)
CHLORIDE SERPL-SCNC: 114 MMOL/L — HIGH (ref 96–108)
CO2 BLDV-SCNC: 25 MMOL/L — SIGNIFICANT CHANGE UP (ref 22–26)
CO2 BLDV-SCNC: 25.6 MMOL/L — SIGNIFICANT CHANGE UP (ref 22–26)
CO2 SERPL-SCNC: 22 MMOL/L — SIGNIFICANT CHANGE UP (ref 22–31)
CO2 SERPL-SCNC: 22 MMOL/L — SIGNIFICANT CHANGE UP (ref 22–31)
CO2 SERPL-SCNC: 25 MMOL/L — SIGNIFICANT CHANGE UP (ref 22–31)
CREAT SERPL-MCNC: 1.26 MG/DL — SIGNIFICANT CHANGE UP (ref 0.5–1.3)
CREAT SERPL-MCNC: 1.39 MG/DL — HIGH (ref 0.5–1.3)
CREAT SERPL-MCNC: 1.53 MG/DL — HIGH (ref 0.5–1.3)
EOSINOPHIL # BLD AUTO: 0 K/UL — SIGNIFICANT CHANGE UP (ref 0–0.5)
EOSINOPHIL NFR BLD AUTO: 0 % — SIGNIFICANT CHANGE UP (ref 0–6)
GAS PNL BLDA: SIGNIFICANT CHANGE UP
GAS PNL BLDV: 142 MMOL/L — SIGNIFICANT CHANGE UP (ref 136–145)
GAS PNL BLDV: 142 MMOL/L — SIGNIFICANT CHANGE UP (ref 136–145)
GAS PNL BLDV: SIGNIFICANT CHANGE UP
GAS PNL BLDV: SIGNIFICANT CHANGE UP
GLUCOSE BLDC GLUCOMTR-MCNC: 126 MG/DL — HIGH (ref 70–99)
GLUCOSE BLDC GLUCOMTR-MCNC: 160 MG/DL — HIGH (ref 70–99)
GLUCOSE SERPL-MCNC: 137 MG/DL — HIGH (ref 70–99)
GLUCOSE SERPL-MCNC: 167 MG/DL — HIGH (ref 70–99)
GLUCOSE SERPL-MCNC: 175 MG/DL — HIGH (ref 70–99)
HAV IGM SER-ACNC: SIGNIFICANT CHANGE UP
HBV CORE AB SER-ACNC: SIGNIFICANT CHANGE UP
HBV CORE IGM SER-ACNC: SIGNIFICANT CHANGE UP
HBV SURFACE AB SER-ACNC: SIGNIFICANT CHANGE UP
HBV SURFACE AG SER-ACNC: SIGNIFICANT CHANGE UP
HCO3 BLDV-SCNC: 24 MMOL/L — SIGNIFICANT CHANGE UP (ref 22–29)
HCO3 BLDV-SCNC: 24 MMOL/L — SIGNIFICANT CHANGE UP (ref 22–29)
HCT VFR BLD CALC: 29.6 % — LOW (ref 39–50)
HCT VFR BLD CALC: 30.5 % — LOW (ref 39–50)
HCT VFR BLD CALC: 33.3 % — LOW (ref 39–50)
HCV AB S/CO SERPL IA: 0.03 S/CO — SIGNIFICANT CHANGE UP
HCV AB SERPL-IMP: SIGNIFICANT CHANGE UP
HGB BLD-MCNC: 10 G/DL — LOW (ref 13–17)
HGB BLD-MCNC: 10.8 G/DL — LOW (ref 13–17)
HGB BLD-MCNC: 9.7 G/DL — LOW (ref 13–17)
HIV 1+2 AB+HIV1 P24 AG SERPL QL IA: SIGNIFICANT CHANGE UP
INR BLD: 0.88 — SIGNIFICANT CHANGE UP (ref 0.88–1.16)
INR BLD: 0.95 — SIGNIFICANT CHANGE UP (ref 0.88–1.16)
INR BLD: 0.95 — SIGNIFICANT CHANGE UP (ref 0.88–1.16)
LACTATE SERPL-SCNC: 2.1 MMOL/L — HIGH (ref 0.5–2)
LACTATE SERPL-SCNC: 2.2 MMOL/L — HIGH (ref 0.5–2)
LACTATE SERPL-SCNC: 2.4 MMOL/L — HIGH (ref 0.5–2)
LYMPHOCYTES # BLD AUTO: 0.92 K/UL — LOW (ref 1–3.3)
LYMPHOCYTES # BLD AUTO: 7.1 % — LOW (ref 13–44)
MAGNESIUM SERPL-MCNC: 2.3 MG/DL — SIGNIFICANT CHANGE UP (ref 1.6–2.6)
MAGNESIUM SERPL-MCNC: 2.4 MG/DL — SIGNIFICANT CHANGE UP (ref 1.6–2.6)
MAGNESIUM SERPL-MCNC: 2.8 MG/DL — HIGH (ref 1.6–2.6)
MANUAL SMEAR VERIFICATION: SIGNIFICANT CHANGE UP
MCHC RBC-ENTMCNC: 28.8 PG — SIGNIFICANT CHANGE UP (ref 27–34)
MCHC RBC-ENTMCNC: 28.8 PG — SIGNIFICANT CHANGE UP (ref 27–34)
MCHC RBC-ENTMCNC: 29 PG — SIGNIFICANT CHANGE UP (ref 27–34)
MCHC RBC-ENTMCNC: 32.4 GM/DL — SIGNIFICANT CHANGE UP (ref 32–36)
MCHC RBC-ENTMCNC: 32.8 GM/DL — SIGNIFICANT CHANGE UP (ref 32–36)
MCHC RBC-ENTMCNC: 32.8 GM/DL — SIGNIFICANT CHANGE UP (ref 32–36)
MCV RBC AUTO: 87.8 FL — SIGNIFICANT CHANGE UP (ref 80–100)
MCV RBC AUTO: 88.4 FL — SIGNIFICANT CHANGE UP (ref 80–100)
MCV RBC AUTO: 88.8 FL — SIGNIFICANT CHANGE UP (ref 80–100)
MONOCYTES # BLD AUTO: 0.34 K/UL — SIGNIFICANT CHANGE UP (ref 0–0.9)
MONOCYTES NFR BLD AUTO: 2.6 % — SIGNIFICANT CHANGE UP (ref 2–14)
MYELOCYTES NFR BLD: 0.9 % — HIGH (ref 0–0)
NEUTROPHILS # BLD AUTO: 11.6 K/UL — HIGH (ref 1.8–7.4)
NEUTROPHILS NFR BLD AUTO: 82.3 % — HIGH (ref 43–77)
NEUTS BAND # BLD: 7.1 % — SIGNIFICANT CHANGE UP (ref 0–8)
NRBC # BLD: 0 /100 WBCS — SIGNIFICANT CHANGE UP (ref 0–0)
NRBC # BLD: 0 /100 WBCS — SIGNIFICANT CHANGE UP (ref 0–0)
PCO2 BLDV: 43 MMHG — SIGNIFICANT CHANGE UP (ref 42–55)
PCO2 BLDV: 49 MMHG — SIGNIFICANT CHANGE UP (ref 42–55)
PH BLDV: 7.3 — LOW (ref 7.32–7.43)
PH BLDV: 7.35 — SIGNIFICANT CHANGE UP (ref 7.32–7.43)
PHOSPHATE SERPL-MCNC: 1.3 MG/DL — LOW (ref 2.5–4.5)
PHOSPHATE SERPL-MCNC: 2.5 MG/DL — SIGNIFICANT CHANGE UP (ref 2.5–4.5)
PHOSPHATE SERPL-MCNC: 2.9 MG/DL — SIGNIFICANT CHANGE UP (ref 2.5–4.5)
PLAT MORPH BLD: NORMAL — SIGNIFICANT CHANGE UP
PLATELET # BLD AUTO: 167 K/UL — SIGNIFICANT CHANGE UP (ref 150–400)
PLATELET # BLD AUTO: 194 K/UL — SIGNIFICANT CHANGE UP (ref 150–400)
PLATELET # BLD AUTO: 218 K/UL — SIGNIFICANT CHANGE UP (ref 150–400)
PO2 BLDV: 29 MMHG — SIGNIFICANT CHANGE UP
PO2 BLDV: 29 MMHG — SIGNIFICANT CHANGE UP
POTASSIUM BLDV-SCNC: 4.6 MMOL/L — SIGNIFICANT CHANGE UP (ref 3.5–5.1)
POTASSIUM BLDV-SCNC: 5.4 MMOL/L — HIGH (ref 3.5–5.1)
POTASSIUM SERPL-MCNC: 4.7 MMOL/L — SIGNIFICANT CHANGE UP (ref 3.5–5.3)
POTASSIUM SERPL-MCNC: 5 MMOL/L — SIGNIFICANT CHANGE UP (ref 3.5–5.3)
POTASSIUM SERPL-MCNC: 5.7 MMOL/L — HIGH (ref 3.5–5.3)
POTASSIUM SERPL-SCNC: 4.7 MMOL/L — SIGNIFICANT CHANGE UP (ref 3.5–5.3)
POTASSIUM SERPL-SCNC: 5 MMOL/L — SIGNIFICANT CHANGE UP (ref 3.5–5.3)
POTASSIUM SERPL-SCNC: 5.7 MMOL/L — HIGH (ref 3.5–5.3)
PROT SERPL-MCNC: 5 G/DL — LOW (ref 6–8.3)
PROT SERPL-MCNC: 5.4 G/DL — LOW (ref 6–8.3)
PROT SERPL-MCNC: 5.5 G/DL — LOW (ref 6–8.3)
PROTHROM AB SERPL-ACNC: 10.6 SEC — SIGNIFICANT CHANGE UP (ref 10.6–13.6)
PROTHROM AB SERPL-ACNC: 11.4 SEC — SIGNIFICANT CHANGE UP (ref 10.6–13.6)
PROTHROM AB SERPL-ACNC: 11.4 SEC — SIGNIFICANT CHANGE UP (ref 10.6–13.6)
RBC # BLD: 3.37 M/UL — LOW (ref 4.2–5.8)
RBC # BLD: 3.45 M/UL — LOW (ref 4.2–5.8)
RBC # BLD: 3.75 M/UL — LOW (ref 4.2–5.8)
RBC # FLD: 15.9 % — HIGH (ref 10.3–14.5)
RBC # FLD: 16.1 % — HIGH (ref 10.3–14.5)
RBC # FLD: 16.1 % — HIGH (ref 10.3–14.5)
RBC BLD AUTO: NORMAL — SIGNIFICANT CHANGE UP
SAO2 % BLDV: 51.6 % — SIGNIFICANT CHANGE UP
SAO2 % BLDV: 54.8 % — SIGNIFICANT CHANGE UP
SMUDGE CELLS # BLD: PRESENT — SIGNIFICANT CHANGE UP
SODIUM SERPL-SCNC: 141 MMOL/L — SIGNIFICANT CHANGE UP (ref 135–145)
SODIUM SERPL-SCNC: 143 MMOL/L — SIGNIFICANT CHANGE UP (ref 135–145)
SODIUM SERPL-SCNC: 145 MMOL/L — SIGNIFICANT CHANGE UP (ref 135–145)
WBC # BLD: 10.26 K/UL — SIGNIFICANT CHANGE UP (ref 3.8–10.5)
WBC # BLD: 12.65 K/UL — HIGH (ref 3.8–10.5)
WBC # BLD: 12.98 K/UL — HIGH (ref 3.8–10.5)
WBC # FLD AUTO: 10.26 K/UL — SIGNIFICANT CHANGE UP (ref 3.8–10.5)
WBC # FLD AUTO: 12.65 K/UL — HIGH (ref 3.8–10.5)
WBC # FLD AUTO: 12.98 K/UL — HIGH (ref 3.8–10.5)

## 2021-06-07 PROCEDURE — 33866 AORTIC HEMIARCH GRAFT: CPT

## 2021-06-07 PROCEDURE — 81001 URINALYSIS AUTO W/SCOPE: CPT

## 2021-06-07 PROCEDURE — 33866 AORTIC HEMIARCH GRAFT: CPT | Mod: 80

## 2021-06-07 PROCEDURE — 33641 REPAIR HEART SEPTUM DEFECT: CPT | Mod: 80,59

## 2021-06-07 PROCEDURE — 80061 LIPID PANEL: CPT

## 2021-06-07 PROCEDURE — 83036 HEMOGLOBIN GLYCOSYLATED A1C: CPT

## 2021-06-07 PROCEDURE — 85025 COMPLETE CBC W/AUTO DIFF WBC: CPT

## 2021-06-07 PROCEDURE — 33863 ASCENDING AORTIC GRAFT: CPT

## 2021-06-07 PROCEDURE — 33641 REPAIR HEART SEPTUM DEFECT: CPT | Mod: 59

## 2021-06-07 PROCEDURE — 33863 ASCENDING AORTIC GRAFT: CPT | Mod: 80

## 2021-06-07 PROCEDURE — 86900 BLOOD TYPING SEROLOGIC ABO: CPT

## 2021-06-07 PROCEDURE — 84443 ASSAY THYROID STIM HORMONE: CPT

## 2021-06-07 PROCEDURE — 93010 ELECTROCARDIOGRAM REPORT: CPT

## 2021-06-07 PROCEDURE — 86850 RBC ANTIBODY SCREEN: CPT

## 2021-06-07 PROCEDURE — 85730 THROMBOPLASTIN TIME PARTIAL: CPT

## 2021-06-07 PROCEDURE — 71045 X-RAY EXAM CHEST 1 VIEW: CPT | Mod: 26

## 2021-06-07 PROCEDURE — 85610 PROTHROMBIN TIME: CPT

## 2021-06-07 PROCEDURE — 87340 HEPATITIS B SURFACE AG IA: CPT

## 2021-06-07 PROCEDURE — 88305 TISSUE EXAM BY PATHOLOGIST: CPT | Mod: 26

## 2021-06-07 PROCEDURE — 35820 EXPLORE CHEST VESSELS: CPT | Mod: 78

## 2021-06-07 PROCEDURE — 36415 COLL VENOUS BLD VENIPUNCTURE: CPT

## 2021-06-07 PROCEDURE — 86901 BLOOD TYPING SEROLOGIC RH(D): CPT

## 2021-06-07 PROCEDURE — 80053 COMPREHEN METABOLIC PANEL: CPT

## 2021-06-07 PROCEDURE — 99292 CRITICAL CARE ADDL 30 MIN: CPT

## 2021-06-07 PROCEDURE — 35820 EXPLORE CHEST VESSELS: CPT | Mod: 80,78

## 2021-06-07 PROCEDURE — 99291 CRITICAL CARE FIRST HOUR: CPT

## 2021-06-07 PROCEDURE — 71045 X-RAY EXAM CHEST 1 VIEW: CPT | Mod: 26,77

## 2021-06-07 RX ORDER — ACETAMINOPHEN 500 MG
1000 TABLET ORAL ONCE
Refills: 0 | Status: COMPLETED | OUTPATIENT
Start: 2021-06-07 | End: 2021-06-08

## 2021-06-07 RX ORDER — ALBUMIN HUMAN 25 %
250 VIAL (ML) INTRAVENOUS
Refills: 0 | Status: COMPLETED | OUTPATIENT
Start: 2021-06-07 | End: 2021-06-07

## 2021-06-07 RX ORDER — SODIUM CHLORIDE 9 MG/ML
1000 INJECTION INTRAMUSCULAR; INTRAVENOUS; SUBCUTANEOUS
Refills: 0 | Status: DISCONTINUED | OUTPATIENT
Start: 2021-06-07 | End: 2021-06-15

## 2021-06-07 RX ORDER — CHLORHEXIDINE GLUCONATE 213 G/1000ML
15 SOLUTION TOPICAL EVERY 12 HOURS
Refills: 0 | Status: DISCONTINUED | OUTPATIENT
Start: 2021-06-07 | End: 2021-06-08

## 2021-06-07 RX ORDER — CEFAZOLIN SODIUM 1 G
2000 VIAL (EA) INJECTION EVERY 8 HOURS
Refills: 0 | Status: DISCONTINUED | OUTPATIENT
Start: 2021-06-07 | End: 2021-06-07

## 2021-06-07 RX ORDER — PROPOFOL 10 MG/ML
10 INJECTION, EMULSION INTRAVENOUS
Qty: 1000 | Refills: 0 | Status: DISCONTINUED | OUTPATIENT
Start: 2021-06-07 | End: 2021-06-08

## 2021-06-07 RX ORDER — DEXMEDETOMIDINE HYDROCHLORIDE IN 0.9% SODIUM CHLORIDE 4 UG/ML
0.2 INJECTION INTRAVENOUS
Qty: 400 | Refills: 0 | Status: DISCONTINUED | OUTPATIENT
Start: 2021-06-07 | End: 2021-06-08

## 2021-06-07 RX ORDER — ASPIRIN/CALCIUM CARB/MAGNESIUM 324 MG
1 TABLET ORAL
Qty: 0 | Refills: 0 | DISCHARGE

## 2021-06-07 RX ORDER — CHLORHEXIDINE GLUCONATE 213 G/1000ML
1 SOLUTION TOPICAL
Refills: 0 | Status: DISCONTINUED | OUTPATIENT
Start: 2021-06-07 | End: 2021-06-15

## 2021-06-07 RX ORDER — CEFAZOLIN SODIUM 1 G
2000 VIAL (EA) INJECTION EVERY 8 HOURS
Refills: 0 | Status: COMPLETED | OUTPATIENT
Start: 2021-06-07 | End: 2021-06-09

## 2021-06-07 RX ORDER — ASPIRIN/CALCIUM CARB/MAGNESIUM 324 MG
81 TABLET ORAL DAILY
Refills: 0 | Status: DISCONTINUED | OUTPATIENT
Start: 2021-06-07 | End: 2021-06-18

## 2021-06-07 RX ORDER — INSULIN HUMAN 100 [IU]/ML
1 INJECTION, SOLUTION SUBCUTANEOUS
Qty: 100 | Refills: 0 | Status: DISCONTINUED | OUTPATIENT
Start: 2021-06-07 | End: 2021-06-08

## 2021-06-07 RX ORDER — DEXTROSE 50 % IN WATER 50 %
25 SYRINGE (ML) INTRAVENOUS
Refills: 0 | Status: DISCONTINUED | OUTPATIENT
Start: 2021-06-07 | End: 2021-06-08

## 2021-06-07 RX ORDER — PANTOPRAZOLE SODIUM 20 MG/1
40 TABLET, DELAYED RELEASE ORAL ONCE
Refills: 0 | Status: COMPLETED | OUTPATIENT
Start: 2021-06-07 | End: 2021-06-08

## 2021-06-07 RX ORDER — HEPARIN SODIUM 5000 [USP'U]/ML
5000 INJECTION INTRAVENOUS; SUBCUTANEOUS EVERY 8 HOURS
Refills: 0 | Status: DISCONTINUED | OUTPATIENT
Start: 2021-06-07 | End: 2021-06-18

## 2021-06-07 RX ORDER — DEXTROSE 50 % IN WATER 50 %
50 SYRINGE (ML) INTRAVENOUS
Refills: 0 | Status: DISCONTINUED | OUTPATIENT
Start: 2021-06-07 | End: 2021-06-08

## 2021-06-07 RX ORDER — ALBUMIN HUMAN 25 %
250 VIAL (ML) INTRAVENOUS
Refills: 0 | Status: COMPLETED | OUTPATIENT
Start: 2021-06-07 | End: 2021-06-08

## 2021-06-07 RX ORDER — EPINEPHRINE 0.3 MG/.3ML
0.02 INJECTION INTRAMUSCULAR; SUBCUTANEOUS
Qty: 4 | Refills: 0 | Status: DISCONTINUED | OUTPATIENT
Start: 2021-06-07 | End: 2021-06-08

## 2021-06-07 RX ORDER — FENTANYL CITRATE 50 UG/ML
25 INJECTION INTRAVENOUS
Refills: 0 | Status: DISCONTINUED | OUTPATIENT
Start: 2021-06-07 | End: 2021-06-08

## 2021-06-07 RX ORDER — VASOPRESSIN 20 [USP'U]/ML
0.03 INJECTION INTRAVENOUS
Qty: 50 | Refills: 0 | Status: DISCONTINUED | OUTPATIENT
Start: 2021-06-07 | End: 2021-06-08

## 2021-06-07 RX ADMIN — Medication 2000 MILLIGRAM(S): at 21:00

## 2021-06-07 RX ADMIN — INSULIN HUMAN 1 UNIT(S)/HR: 100 INJECTION, SOLUTION SUBCUTANEOUS at 22:00

## 2021-06-07 RX ADMIN — Medication 250 MILLILITER(S): at 20:08

## 2021-06-07 RX ADMIN — SODIUM CHLORIDE 10 MILLILITER(S): 9 INJECTION INTRAMUSCULAR; INTRAVENOUS; SUBCUTANEOUS at 22:00

## 2021-06-07 RX ADMIN — Medication 250 MILLILITER(S): at 22:04

## 2021-06-07 NOTE — BRIEF OPERATIVE NOTE - OPERATION/FINDINGS
Mild MR.   Aortic valve with fenestrations, not ammnable for valve sparing procedure.   AVR, Aortic root, ascending, hemiarch replacement. PFO closure   Bypass time 182  Cross clamp time 129  Circ arrest 15

## 2021-06-07 NOTE — BRIEF OPERATIVE NOTE - NSICDXBRIEFPOSTOP_GEN_ALL_CORE_FT
POST-OP DIAGNOSIS:  Aortic aneurysm 07-Jun-2021 15:36:15  Maria Elena Grimm  
POST-OP DIAGNOSIS:  Aortic aneurysm 07-Jun-2021 15:36:15  Maria Elena Grimm

## 2021-06-07 NOTE — BRIEF OPERATIVE NOTE - NSICDXBRIEFPREOP_GEN_ALL_CORE_FT
PRE-OP DIAGNOSIS:  Aortic aneurysm 07-Jun-2021 15:35:37  Maria Elena Grimm  Aortic insufficiency 07-Jun-2021 15:35:47  Maria Elena Grimm  PFO (patent foramen ovale) 07-Jun-2021 15:35:54  Maria Elena Grimm  
PRE-OP DIAGNOSIS:  Aortic aneurysm 07-Jun-2021 15:35:37  Maria Elena Grimm  Aortic insufficiency 07-Jun-2021 15:35:47  Maria Elena Grimm  PFO (patent foramen ovale) 07-Jun-2021 15:35:54  Maria Elena Grimm

## 2021-06-07 NOTE — BRIEF OPERATIVE NOTE - COMMENTS
I first assisted for the entirety of the case, including but not limited to opening, cannulation, valve repair/replacement, aortic root, ascending and hemiarch replacement, decannulation, and closure. I first assisted for the entirety of the case, including but not limited to opening, cannulation, valve repair/replacement, aortic root, ascending and hemiarch replacement, PFO closure decannulation, and closure. Dr. Canela  first assisted for the entirety of the case, including but not limited to opening, cannulation, valve repair/replacement, aortic root, ascending and hemiarch replacement, PFO closure decannulation, and closure.

## 2021-06-07 NOTE — BRIEF OPERATIVE NOTE - NSICDXBRIEFPROCEDURE_GEN_ALL_CORE_FT
PROCEDURES:  Replacement of aortic root and ascending aorta 07-Jun-2021 15:34:59 Aortic root, ascending, hemiarch replacement. PFO closure EF 40% Maria Elena Grimm  Reexploration, mediastinum, sternotomy approach 07-Jun-2021 18:08:52 RTOR for bleeding Maria Elena Grimm  
PROCEDURES:  Replacement of aortic root and ascending aorta 07-Jun-2021 15:34:59 Aortic root, ascending, hemiarch replacement. PFO closure EF 40% Maria Elena Grimm

## 2021-06-07 NOTE — BRIEF OPERATIVE NOTE - COMMENTS
Dr. Canela was the first assistant for this case including but not limited to  opening, control of hemorrhage and closure.    I was present for this procedure and participated as first assistant as described by the PA above, unless otherwise noted below.

## 2021-06-07 NOTE — PROGRESS NOTE ADULT - SUBJECTIVE AND OBJECTIVE BOX
CTICU  CRITICAL  CARE  attending     Hand off received 					   Pertinent clinical, laboratory, radiographic, hemodynamic, echocardiographic, respiratory data, microbiologic data and chart were reviewed and analyzed frequently throughout the course of the day and night  Patient seen and examined with CTS/ SH attending at bedside  Pt is a 61y , Male, HEALTH ISSUES - PROBLEM Dx:      , FAMILY HISTORY:  FH: hypertension (Mother, Sibling)    PAST MEDICAL & SURGICAL HISTORY:  Hypertension    Hyperlipidemia    NSTEMI (non-ST elevation myocardial infarction)    CKD (chronic kidney disease)    TIA (transient ischemic attack)    CAD (coronary artery disease)    Aortic regurgitation    Thoracic aortic aneurysm    Aortic insufficiency    Mitral regurgitation    Aortic aneurysm    No significant past surgical history      Patient is a 61y old  Male who presents with a chief complaint of aortic regurgitation, mitral regurgitation, thoracic aortic aneurysm, and coronary artery disease (03 Jun 2021 13:53)      14 system review limited by mentation and multiorgan morbidity     Vital signs, hemodynamic and respiratory parameters were reviewed from the bedside nursing flowsheet.  ICU Vital Signs Last 24 Hrs  T(C): 36.3 (07 Jun 2021 15:30), Max: 36.3 (07 Jun 2021 15:30)  T(F): 97.4 (07 Jun 2021 15:30), Max: 97.4 (07 Jun 2021 15:30)  HR: 82 (07 Jun 2021 16:00) (81 - 84)  BP: --  BP(mean): --  ABP: 119/70 (07 Jun 2021 16:00) (118/74 - 136/81)  ABP(mean): 87 (07 Jun 2021 16:00) (87 - 100)  RR: 14 (07 Jun 2021 16:00) (14 - 14)  SpO2: 100% (07 Jun 2021 16:00) (100% - 100%)    Adult Advanced Hemodynamics Last 24 Hrs  CVP(mm Hg): 6 (07 Jun 2021 16:00) (6 - 6)  CVP(cm H2O): --  CO: --  CI: --  PA: 12/5 (07 Jun 2021 16:00) (12/5 - 12/5)  PA(mean): 9 (07 Jun 2021 16:00) (9 - 9)  PCWP: --  SVR: --  SVRI: --  PVR: --  PVRI: --, ABG - ( 07 Jun 2021 15:42 )  pH, Arterial: 7.35  pH, Blood: x     /  pCO2: 42    /  pO2: 195   / HCO3: 23    / Base Excess: -2.4  /  SaO2: 99.1              Mode: standby,Patient went back to the OR    Intake and output was reviewed and the fluid balance was calculated  Daily     Daily   I&O's Summary    07 Jun 2021 07:01  -  07 Jun 2021 17:48  --------------------------------------------------------  IN: 725.1 mL / OUT: 800 mL / NET: -74.9 mL        All lines and drain sites were assessed  Glycemic trend was reviewedCAPILLARY BLOOD GLUCOSE        No acute change in focality  Auscultation of the chest reveals equal bs  Abdomen is soft  Extremities are warm and well perfused  Wounds appear clean and unremarkable  Antibiotics are periop    labs  CBC Full  -  ( 07 Jun 2021 15:46 )  WBC Count : 12.98 K/uL  RBC Count : 3.75 M/uL  Hemoglobin : 10.8 g/dL  Hematocrit : 33.3 %  Platelet Count - Automated : 167 K/uL  Mean Cell Volume : 88.8 fl  Mean Cell Hemoglobin : 28.8 pg  Mean Cell Hemoglobin Concentration : 32.4 gm/dL  Auto Neutrophil # : 11.60 K/uL  Auto Lymphocyte # : 0.92 K/uL  Auto Monocyte # : 0.34 K/uL  Auto Eosinophil # : 0.00 K/uL  Auto Basophil # : 0.00 K/uL  Auto Neutrophil % : 82.3 %  Auto Lymphocyte % : 7.1 %  Auto Monocyte % : 2.6 %  Auto Eosinophil % : 0.0 %  Auto Basophil % : 0.0 %    06-07    145  |  114<H>  |  17  ----------------------------<  137<H>  5.0   |  25  |  1.53<H>    Ca    8.6      07 Jun 2021 15:46  Phos  1.3     06-07  Mg     2.8     06-07    TPro  5.4<L>  /  Alb  3.2<L>  /  TBili  0.5  /  DBili  x   /  AST  34  /  ALT  11  /  AlkPhos  33<L>  06-07    PT/INR - ( 07 Jun 2021 15:46 )   PT: 11.4 sec;   INR: 0.95          PTT - ( 07 Jun 2021 15:46 )  PTT:29.3 sec  The current medications were reviewed   MEDICATIONS  (STANDING):  aspirin enteric coated 81 milliGRAM(s) Oral daily  ceFAZolin  Injectable. 2000 milliGRAM(s) IV Push every 8 hours  chlorhexidine 0.12% Liquid 15 milliLiter(s) Oral Mucosa every 12 hours  chlorhexidine 2% Cloths 1 Application(s) Topical <User Schedule>  dextrose 50% Injectable 50 milliLiter(s) IV Push every 15 minutes  dextrose 50% Injectable 25 milliLiter(s) IV Push every 15 minutes  EPINEPHrine    Infusion 0.02 MICROgram(s)/kG/Min (7.42 mL/Hr) IV Continuous <Continuous>  heparin   Injectable 5000 Unit(s) SubCutaneous every 8 hours  insulin regular Infusion 1 Unit(s)/Hr (1 mL/Hr) IV Continuous <Continuous>  pantoprazole    Tablet 40 milliGRAM(s) Oral once  sodium chloride 0.9%. 1000 milliLiter(s) (10 mL/Hr) IV Continuous <Continuous>  vasopressin Infusion 0.033 Unit(s)/Min (2 mL/Hr) IV Continuous <Continuous>    MEDICATIONS  (PRN):       PROBLEM LIST/ ASSESSMENT:  HEALTH ISSUES - PROBLEM Dx:      ,   Patient is a 61y old  Male who presents with a chief complaint of aortic regurgitation, mitral regurgitation, thoracic aortic aneurysm, and coronary artery disease (03 Jun 2021 13:53)     s/p cardiac surgery                My plan includes :  close hemodynamic, ventilatory and drain monitoring and management per post op routine    Monitor for arrhythmias and monitor parameters for organ perfusion  beta blockade not administered due to hemodynamic instability and bradycardia  monitor neurologic status  Head of the bed should remain elevated to 45 deg .   chest PT and IS will be encouraged  monitor adequacy of oxygenation and ventilation and attempt to wean oxygen  antibiotic regimen will be tailored to the clinical, laboratory and microbiologic data  Nutritional goals will be met using po eventually , ensure adequate caloric intake and montior the same  Stress ulcer and VTE prophylaxis will be achieved    Glycemic control is satisfactory  Electrolytes have been repleted as necessary and wound care has been carried out. Pain control has been achieved.   agressive physical therapy and early mobility and ambulation goals will be met   The family was updated about the course and plan  CRITICAL CARE TIME personally provided by me  in evaluation and management, reassessments, review and interpretation of labs and x-rays, ventilator and hemodynamic management, formulating a plan and coordinating care: ___90____ MIN.  Time does not include procedural time. Time spent was non routine post-operarive caRE and included multiple and repeated evaluations at the bedside  CTICU ATTENDING     					    Jack Garcia MD

## 2021-06-08 PROBLEM — I25.10 ATHEROSCLEROTIC HEART DISEASE OF NATIVE CORONARY ARTERY WITHOUT ANGINA PECTORIS: Chronic | Status: ACTIVE | Noted: 2021-06-03

## 2021-06-08 PROBLEM — I71.2 THORACIC AORTIC ANEURYSM, WITHOUT RUPTURE: Chronic | Status: ACTIVE | Noted: 2021-06-03

## 2021-06-08 PROBLEM — I35.1 NONRHEUMATIC AORTIC (VALVE) INSUFFICIENCY: Chronic | Status: ACTIVE | Noted: 2021-06-07

## 2021-06-08 PROBLEM — I71.9 AORTIC ANEURYSM OF UNSPECIFIED SITE, WITHOUT RUPTURE: Chronic | Status: ACTIVE | Noted: 2021-06-07

## 2021-06-08 PROBLEM — I35.1 NONRHEUMATIC AORTIC (VALVE) INSUFFICIENCY: Chronic | Status: ACTIVE | Noted: 2021-06-03

## 2021-06-08 PROBLEM — I34.0 NONRHEUMATIC MITRAL (VALVE) INSUFFICIENCY: Chronic | Status: ACTIVE | Noted: 2021-06-07

## 2021-06-08 LAB
ALBUMIN SERPL ELPH-MCNC: 3.4 G/DL — SIGNIFICANT CHANGE UP (ref 3.3–5)
ALBUMIN SERPL ELPH-MCNC: 3.5 G/DL — SIGNIFICANT CHANGE UP (ref 3.3–5)
ALBUMIN SERPL ELPH-MCNC: 3.6 G/DL — SIGNIFICANT CHANGE UP (ref 3.3–5)
ALP SERPL-CCNC: 29 U/L — LOW (ref 40–120)
ALP SERPL-CCNC: 30 U/L — LOW (ref 40–120)
ALP SERPL-CCNC: 35 U/L — LOW (ref 40–120)
ALT FLD-CCNC: 10 U/L — SIGNIFICANT CHANGE UP (ref 10–45)
ALT FLD-CCNC: 10 U/L — SIGNIFICANT CHANGE UP (ref 10–45)
ALT FLD-CCNC: 11 U/L — SIGNIFICANT CHANGE UP (ref 10–45)
ANION GAP SERPL CALC-SCNC: 11 MMOL/L — SIGNIFICANT CHANGE UP (ref 5–17)
ANION GAP SERPL CALC-SCNC: 11 MMOL/L — SIGNIFICANT CHANGE UP (ref 5–17)
ANION GAP SERPL CALC-SCNC: 9 MMOL/L — SIGNIFICANT CHANGE UP (ref 5–17)
APTT BLD: 23.4 SEC — LOW (ref 27.5–35.5)
APTT BLD: 26.3 SEC — LOW (ref 27.5–35.5)
APTT BLD: 27.1 SEC — LOW (ref 27.5–35.5)
AST SERPL-CCNC: 33 U/L — SIGNIFICANT CHANGE UP (ref 10–40)
AST SERPL-CCNC: 35 U/L — SIGNIFICANT CHANGE UP (ref 10–40)
AST SERPL-CCNC: 37 U/L — SIGNIFICANT CHANGE UP (ref 10–40)
BASE EXCESS BLDV CALC-SCNC: 0 MMOL/L — SIGNIFICANT CHANGE UP (ref -2–3)
BASE EXCESS BLDV CALC-SCNC: 1.5 MMOL/L — SIGNIFICANT CHANGE UP (ref -2–3)
BILIRUB SERPL-MCNC: 0.5 MG/DL — SIGNIFICANT CHANGE UP (ref 0.2–1.2)
BILIRUB SERPL-MCNC: 0.5 MG/DL — SIGNIFICANT CHANGE UP (ref 0.2–1.2)
BILIRUB SERPL-MCNC: 0.6 MG/DL — SIGNIFICANT CHANGE UP (ref 0.2–1.2)
BUN SERPL-MCNC: 17 MG/DL — SIGNIFICANT CHANGE UP (ref 7–23)
BUN SERPL-MCNC: 18 MG/DL — SIGNIFICANT CHANGE UP (ref 7–23)
BUN SERPL-MCNC: 19 MG/DL — SIGNIFICANT CHANGE UP (ref 7–23)
CA-I SERPL-SCNC: 1.14 MMOL/L — LOW (ref 1.15–1.33)
CALCIUM SERPL-MCNC: 7.8 MG/DL — LOW (ref 8.4–10.5)
CALCIUM SERPL-MCNC: 8.1 MG/DL — LOW (ref 8.4–10.5)
CALCIUM SERPL-MCNC: 8.1 MG/DL — LOW (ref 8.4–10.5)
CHLORIDE SERPL-SCNC: 106 MMOL/L — SIGNIFICANT CHANGE UP (ref 96–108)
CHLORIDE SERPL-SCNC: 110 MMOL/L — HIGH (ref 96–108)
CHLORIDE SERPL-SCNC: 112 MMOL/L — HIGH (ref 96–108)
CO2 BLDV-SCNC: 26 MMOL/L — SIGNIFICANT CHANGE UP (ref 22–26)
CO2 BLDV-SCNC: 27.9 MMOL/L — HIGH (ref 22–26)
CO2 SERPL-SCNC: 22 MMOL/L — SIGNIFICANT CHANGE UP (ref 22–31)
CO2 SERPL-SCNC: 23 MMOL/L — SIGNIFICANT CHANGE UP (ref 22–31)
CO2 SERPL-SCNC: 24 MMOL/L — SIGNIFICANT CHANGE UP (ref 22–31)
CREAT SERPL-MCNC: 0.95 MG/DL — SIGNIFICANT CHANGE UP (ref 0.5–1.3)
CREAT SERPL-MCNC: 1.12 MG/DL — SIGNIFICANT CHANGE UP (ref 0.5–1.3)
CREAT SERPL-MCNC: 1.25 MG/DL — SIGNIFICANT CHANGE UP (ref 0.5–1.3)
GAS PNL BLDA: SIGNIFICANT CHANGE UP
GAS PNL BLDV: 142 MMOL/L — SIGNIFICANT CHANGE UP (ref 136–145)
GAS PNL BLDV: SIGNIFICANT CHANGE UP
GLUCOSE BLDC GLUCOMTR-MCNC: 100 MG/DL — HIGH (ref 70–99)
GLUCOSE BLDC GLUCOMTR-MCNC: 104 MG/DL — HIGH (ref 70–99)
GLUCOSE BLDC GLUCOMTR-MCNC: 105 MG/DL — HIGH (ref 70–99)
GLUCOSE BLDC GLUCOMTR-MCNC: 106 MG/DL — HIGH (ref 70–99)
GLUCOSE BLDC GLUCOMTR-MCNC: 107 MG/DL — HIGH (ref 70–99)
GLUCOSE BLDC GLUCOMTR-MCNC: 108 MG/DL — HIGH (ref 70–99)
GLUCOSE BLDC GLUCOMTR-MCNC: 108 MG/DL — HIGH (ref 70–99)
GLUCOSE BLDC GLUCOMTR-MCNC: 110 MG/DL — HIGH (ref 70–99)
GLUCOSE BLDC GLUCOMTR-MCNC: 111 MG/DL — HIGH (ref 70–99)
GLUCOSE BLDC GLUCOMTR-MCNC: 111 MG/DL — HIGH (ref 70–99)
GLUCOSE BLDC GLUCOMTR-MCNC: 112 MG/DL — HIGH (ref 70–99)
GLUCOSE BLDC GLUCOMTR-MCNC: 113 MG/DL — HIGH (ref 70–99)
GLUCOSE BLDC GLUCOMTR-MCNC: 115 MG/DL — HIGH (ref 70–99)
GLUCOSE BLDC GLUCOMTR-MCNC: 117 MG/DL — HIGH (ref 70–99)
GLUCOSE BLDC GLUCOMTR-MCNC: 121 MG/DL — HIGH (ref 70–99)
GLUCOSE BLDC GLUCOMTR-MCNC: 87 MG/DL — SIGNIFICANT CHANGE UP (ref 70–99)
GLUCOSE BLDC GLUCOMTR-MCNC: 93 MG/DL — SIGNIFICANT CHANGE UP (ref 70–99)
GLUCOSE BLDC GLUCOMTR-MCNC: 96 MG/DL — SIGNIFICANT CHANGE UP (ref 70–99)
GLUCOSE SERPL-MCNC: 105 MG/DL — HIGH (ref 70–99)
GLUCOSE SERPL-MCNC: 120 MG/DL — HIGH (ref 70–99)
GLUCOSE SERPL-MCNC: 125 MG/DL — HIGH (ref 70–99)
HAV IGG SER QL IA: SIGNIFICANT CHANGE UP
HCO3 BLDV-SCNC: 25 MMOL/L — SIGNIFICANT CHANGE UP (ref 22–29)
HCO3 BLDV-SCNC: 27 MMOL/L — SIGNIFICANT CHANGE UP (ref 22–29)
HCT VFR BLD CALC: 25.4 % — LOW (ref 39–50)
HCT VFR BLD CALC: 27.9 % — LOW (ref 39–50)
HCT VFR BLD CALC: 28 % — LOW (ref 39–50)
HGB BLD-MCNC: 8.4 G/DL — LOW (ref 13–17)
HGB BLD-MCNC: 9.2 G/DL — LOW (ref 13–17)
HGB BLD-MCNC: 9.4 G/DL — LOW (ref 13–17)
INR BLD: 1.06 — SIGNIFICANT CHANGE UP (ref 0.88–1.16)
INR BLD: 1.11 — SIGNIFICANT CHANGE UP (ref 0.88–1.16)
INR BLD: 1.11 — SIGNIFICANT CHANGE UP (ref 0.88–1.16)
LACTATE SERPL-SCNC: 2.1 MMOL/L — HIGH (ref 0.5–2)
MAGNESIUM SERPL-MCNC: 2 MG/DL — SIGNIFICANT CHANGE UP (ref 1.6–2.6)
MAGNESIUM SERPL-MCNC: 2.1 MG/DL — SIGNIFICANT CHANGE UP (ref 1.6–2.6)
MAGNESIUM SERPL-MCNC: 2.3 MG/DL — SIGNIFICANT CHANGE UP (ref 1.6–2.6)
MCHC RBC-ENTMCNC: 28.7 PG — SIGNIFICANT CHANGE UP (ref 27–34)
MCHC RBC-ENTMCNC: 29 PG — SIGNIFICANT CHANGE UP (ref 27–34)
MCHC RBC-ENTMCNC: 29.3 PG — SIGNIFICANT CHANGE UP (ref 27–34)
MCHC RBC-ENTMCNC: 33 GM/DL — SIGNIFICANT CHANGE UP (ref 32–36)
MCHC RBC-ENTMCNC: 33.1 GM/DL — SIGNIFICANT CHANGE UP (ref 32–36)
MCHC RBC-ENTMCNC: 33.6 GM/DL — SIGNIFICANT CHANGE UP (ref 32–36)
MCV RBC AUTO: 86.9 FL — SIGNIFICANT CHANGE UP (ref 80–100)
MCV RBC AUTO: 87.2 FL — SIGNIFICANT CHANGE UP (ref 80–100)
MCV RBC AUTO: 87.6 FL — SIGNIFICANT CHANGE UP (ref 80–100)
NRBC # BLD: 0 /100 WBCS — SIGNIFICANT CHANGE UP (ref 0–0)
PCO2 BLDV: 40 MMHG — LOW (ref 42–55)
PCO2 BLDV: 43 MMHG — SIGNIFICANT CHANGE UP (ref 42–55)
PH BLDV: 7.4 — SIGNIFICANT CHANGE UP (ref 7.32–7.43)
PH BLDV: 7.4 — SIGNIFICANT CHANGE UP (ref 7.32–7.43)
PHOSPHATE SERPL-MCNC: 2.6 MG/DL — SIGNIFICANT CHANGE UP (ref 2.5–4.5)
PHOSPHATE SERPL-MCNC: 3.1 MG/DL — SIGNIFICANT CHANGE UP (ref 2.5–4.5)
PHOSPHATE SERPL-MCNC: 3.6 MG/DL — SIGNIFICANT CHANGE UP (ref 2.5–4.5)
PLATELET # BLD AUTO: 150 K/UL — SIGNIFICANT CHANGE UP (ref 150–400)
PLATELET # BLD AUTO: 150 K/UL — SIGNIFICANT CHANGE UP (ref 150–400)
PLATELET # BLD AUTO: 177 K/UL — SIGNIFICANT CHANGE UP (ref 150–400)
PO2 BLDV: 34 MMHG — SIGNIFICANT CHANGE UP
PO2 BLDV: 37 MMHG — SIGNIFICANT CHANGE UP
POTASSIUM BLDV-SCNC: 4.3 MMOL/L — SIGNIFICANT CHANGE UP (ref 3.5–5.1)
POTASSIUM SERPL-MCNC: 3.8 MMOL/L — SIGNIFICANT CHANGE UP (ref 3.5–5.3)
POTASSIUM SERPL-MCNC: 4.1 MMOL/L — SIGNIFICANT CHANGE UP (ref 3.5–5.3)
POTASSIUM SERPL-MCNC: 4.3 MMOL/L — SIGNIFICANT CHANGE UP (ref 3.5–5.3)
POTASSIUM SERPL-SCNC: 3.8 MMOL/L — SIGNIFICANT CHANGE UP (ref 3.5–5.3)
POTASSIUM SERPL-SCNC: 4.1 MMOL/L — SIGNIFICANT CHANGE UP (ref 3.5–5.3)
POTASSIUM SERPL-SCNC: 4.3 MMOL/L — SIGNIFICANT CHANGE UP (ref 3.5–5.3)
PROT SERPL-MCNC: 5.7 G/DL — LOW (ref 6–8.3)
PROT SERPL-MCNC: 5.7 G/DL — LOW (ref 6–8.3)
PROT SERPL-MCNC: 6.1 G/DL — SIGNIFICANT CHANGE UP (ref 6–8.3)
PROTHROM AB SERPL-ACNC: 12.7 SEC — SIGNIFICANT CHANGE UP (ref 10.6–13.6)
PROTHROM AB SERPL-ACNC: 13.2 SEC — SIGNIFICANT CHANGE UP (ref 10.6–13.6)
PROTHROM AB SERPL-ACNC: 13.3 SEC — SIGNIFICANT CHANGE UP (ref 10.6–13.6)
RBC # BLD: 2.9 M/UL — LOW (ref 4.2–5.8)
RBC # BLD: 3.21 M/UL — LOW (ref 4.2–5.8)
RBC # BLD: 3.21 M/UL — LOW (ref 4.2–5.8)
RBC # FLD: 16 % — HIGH (ref 10.3–14.5)
RBC # FLD: 16.4 % — HIGH (ref 10.3–14.5)
RBC # FLD: 16.5 % — HIGH (ref 10.3–14.5)
SAO2 % BLDV: 55.6 % — SIGNIFICANT CHANGE UP
SAO2 % BLDV: 58.7 % — SIGNIFICANT CHANGE UP
SODIUM SERPL-SCNC: 139 MMOL/L — SIGNIFICANT CHANGE UP (ref 135–145)
SODIUM SERPL-SCNC: 144 MMOL/L — SIGNIFICANT CHANGE UP (ref 135–145)
SODIUM SERPL-SCNC: 145 MMOL/L — SIGNIFICANT CHANGE UP (ref 135–145)
WBC # BLD: 10.65 K/UL — HIGH (ref 3.8–10.5)
WBC # BLD: 10.93 K/UL — HIGH (ref 3.8–10.5)
WBC # BLD: 16.33 K/UL — HIGH (ref 3.8–10.5)
WBC # FLD AUTO: 10.65 K/UL — HIGH (ref 3.8–10.5)
WBC # FLD AUTO: 10.93 K/UL — HIGH (ref 3.8–10.5)
WBC # FLD AUTO: 16.33 K/UL — HIGH (ref 3.8–10.5)

## 2021-06-08 PROCEDURE — 99292 CRITICAL CARE ADDL 30 MIN: CPT

## 2021-06-08 PROCEDURE — 71045 X-RAY EXAM CHEST 1 VIEW: CPT | Mod: 26

## 2021-06-08 PROCEDURE — 31645 BRNCHSC W/THER ASPIR 1ST: CPT

## 2021-06-08 PROCEDURE — 99292 CRITICAL CARE ADDL 30 MIN: CPT | Mod: 25

## 2021-06-08 PROCEDURE — 99291 CRITICAL CARE FIRST HOUR: CPT | Mod: 25

## 2021-06-08 RX ORDER — IPRATROPIUM/ALBUTEROL SULFATE 18-103MCG
3 AEROSOL WITH ADAPTER (GRAM) INHALATION EVERY 6 HOURS
Refills: 0 | Status: COMPLETED | OUTPATIENT
Start: 2021-06-08 | End: 2021-06-10

## 2021-06-08 RX ORDER — INSULIN LISPRO 100/ML
VIAL (ML) SUBCUTANEOUS
Refills: 0 | Status: DISCONTINUED | OUTPATIENT
Start: 2021-06-08 | End: 2021-06-10

## 2021-06-08 RX ORDER — GLUCAGON INJECTION, SOLUTION 0.5 MG/.1ML
1 INJECTION, SOLUTION SUBCUTANEOUS ONCE
Refills: 0 | Status: DISCONTINUED | OUTPATIENT
Start: 2021-06-08 | End: 2021-06-10

## 2021-06-08 RX ORDER — ACETAMINOPHEN 500 MG
1000 TABLET ORAL ONCE
Refills: 0 | Status: COMPLETED | OUTPATIENT
Start: 2021-06-08 | End: 2021-06-08

## 2021-06-08 RX ORDER — DEXTROSE 50 % IN WATER 50 %
25 SYRINGE (ML) INTRAVENOUS ONCE
Refills: 0 | Status: DISCONTINUED | OUTPATIENT
Start: 2021-06-08 | End: 2021-06-10

## 2021-06-08 RX ORDER — POLYETHYLENE GLYCOL 3350 17 G/17G
17 POWDER, FOR SOLUTION ORAL DAILY
Refills: 0 | Status: DISCONTINUED | OUTPATIENT
Start: 2021-06-08 | End: 2021-06-18

## 2021-06-08 RX ORDER — FUROSEMIDE 40 MG
20 TABLET ORAL ONCE
Refills: 0 | Status: COMPLETED | OUTPATIENT
Start: 2021-06-08 | End: 2021-06-08

## 2021-06-08 RX ORDER — FENTANYL CITRATE 50 UG/ML
25 INJECTION INTRAVENOUS
Refills: 0 | Status: DISCONTINUED | OUTPATIENT
Start: 2021-06-08 | End: 2021-06-10

## 2021-06-08 RX ORDER — HYDROMORPHONE HYDROCHLORIDE 2 MG/ML
0.5 INJECTION INTRAMUSCULAR; INTRAVENOUS; SUBCUTANEOUS ONCE
Refills: 0 | Status: DISCONTINUED | OUTPATIENT
Start: 2021-06-08 | End: 2021-06-08

## 2021-06-08 RX ORDER — OXYCODONE HYDROCHLORIDE 5 MG/1
10 TABLET ORAL EVERY 6 HOURS
Refills: 0 | Status: DISCONTINUED | OUTPATIENT
Start: 2021-06-08 | End: 2021-06-13

## 2021-06-08 RX ORDER — NICARDIPINE HYDROCHLORIDE 30 MG/1
5 CAPSULE, EXTENDED RELEASE ORAL
Qty: 40 | Refills: 0 | Status: DISCONTINUED | OUTPATIENT
Start: 2021-06-08 | End: 2021-06-09

## 2021-06-08 RX ORDER — CALCIUM GLUCONATE 100 MG/ML
2 VIAL (ML) INTRAVENOUS ONCE
Refills: 0 | Status: COMPLETED | OUTPATIENT
Start: 2021-06-08 | End: 2021-06-08

## 2021-06-08 RX ORDER — ACETAMINOPHEN 500 MG
650 TABLET ORAL EVERY 6 HOURS
Refills: 0 | Status: DISCONTINUED | OUTPATIENT
Start: 2021-06-08 | End: 2021-06-18

## 2021-06-08 RX ORDER — DEXTROSE 50 % IN WATER 50 %
12.5 SYRINGE (ML) INTRAVENOUS ONCE
Refills: 0 | Status: DISCONTINUED | OUTPATIENT
Start: 2021-06-08 | End: 2021-06-10

## 2021-06-08 RX ORDER — SODIUM CHLORIDE 9 MG/ML
1000 INJECTION, SOLUTION INTRAVENOUS
Refills: 0 | Status: DISCONTINUED | OUTPATIENT
Start: 2021-06-08 | End: 2021-06-10

## 2021-06-08 RX ORDER — DEXTROSE 50 % IN WATER 50 %
15 SYRINGE (ML) INTRAVENOUS ONCE
Refills: 0 | Status: DISCONTINUED | OUTPATIENT
Start: 2021-06-08 | End: 2021-06-10

## 2021-06-08 RX ORDER — OXYCODONE HYDROCHLORIDE 5 MG/1
5 TABLET ORAL EVERY 4 HOURS
Refills: 0 | Status: DISCONTINUED | OUTPATIENT
Start: 2021-06-08 | End: 2021-06-14

## 2021-06-08 RX ORDER — IPRATROPIUM/ALBUTEROL SULFATE 18-103MCG
3 AEROSOL WITH ADAPTER (GRAM) INHALATION ONCE
Refills: 0 | Status: COMPLETED | OUTPATIENT
Start: 2021-06-08 | End: 2021-06-08

## 2021-06-08 RX ADMIN — FENTANYL CITRATE 25 MICROGRAM(S): 50 INJECTION INTRAVENOUS at 11:03

## 2021-06-08 RX ADMIN — PROPOFOL 5.93 MICROGRAM(S)/KG/MIN: 10 INJECTION, EMULSION INTRAVENOUS at 02:00

## 2021-06-08 RX ADMIN — OXYCODONE HYDROCHLORIDE 10 MILLIGRAM(S): 5 TABLET ORAL at 23:32

## 2021-06-08 RX ADMIN — FENTANYL CITRATE 25 MICROGRAM(S): 50 INJECTION INTRAVENOUS at 06:30

## 2021-06-08 RX ADMIN — HEPARIN SODIUM 5000 UNIT(S): 5000 INJECTION INTRAVENOUS; SUBCUTANEOUS at 22:04

## 2021-06-08 RX ADMIN — Medication 1000 MILLIGRAM(S): at 14:14

## 2021-06-08 RX ADMIN — Medication 2000 MILLIGRAM(S): at 13:11

## 2021-06-08 RX ADMIN — FENTANYL CITRATE 25 MICROGRAM(S): 50 INJECTION INTRAVENOUS at 17:36

## 2021-06-08 RX ADMIN — Medication 2000 MILLIGRAM(S): at 22:04

## 2021-06-08 RX ADMIN — CHLORHEXIDINE GLUCONATE 15 MILLILITER(S): 213 SOLUTION TOPICAL at 06:00

## 2021-06-08 RX ADMIN — FENTANYL CITRATE 25 MICROGRAM(S): 50 INJECTION INTRAVENOUS at 10:54

## 2021-06-08 RX ADMIN — Medication 250 MILLILITER(S): at 00:00

## 2021-06-08 RX ADMIN — Medication 20 MILLIGRAM(S): at 09:37

## 2021-06-08 RX ADMIN — Medication 1000 MILLIGRAM(S): at 03:00

## 2021-06-08 RX ADMIN — HYDROMORPHONE HYDROCHLORIDE 0.5 MILLIGRAM(S): 2 INJECTION INTRAMUSCULAR; INTRAVENOUS; SUBCUTANEOUS at 18:12

## 2021-06-08 RX ADMIN — Medication 400 MILLIGRAM(S): at 02:45

## 2021-06-08 RX ADMIN — FENTANYL CITRATE 25 MICROGRAM(S): 50 INJECTION INTRAVENOUS at 16:15

## 2021-06-08 RX ADMIN — NICARDIPINE HYDROCHLORIDE 25 MG/HR: 30 CAPSULE, EXTENDED RELEASE ORAL at 23:06

## 2021-06-08 RX ADMIN — Medication 400 MILLIGRAM(S): at 14:05

## 2021-06-08 RX ADMIN — NICARDIPINE HYDROCHLORIDE 25 MG/HR: 30 CAPSULE, EXTENDED RELEASE ORAL at 18:41

## 2021-06-08 RX ADMIN — CHLORHEXIDINE GLUCONATE 15 MILLILITER(S): 213 SOLUTION TOPICAL at 17:36

## 2021-06-08 RX ADMIN — HEPARIN SODIUM 5000 UNIT(S): 5000 INJECTION INTRAVENOUS; SUBCUTANEOUS at 06:30

## 2021-06-08 RX ADMIN — EPINEPHRINE 7.42 MICROGRAM(S)/KG/MIN: 0.3 INJECTION INTRAMUSCULAR; SUBCUTANEOUS at 02:01

## 2021-06-08 RX ADMIN — PANTOPRAZOLE SODIUM 40 MILLIGRAM(S): 20 TABLET, DELAYED RELEASE ORAL at 22:06

## 2021-06-08 RX ADMIN — Medication 200 GRAM(S): at 04:34

## 2021-06-08 RX ADMIN — Medication 400 MILLIGRAM(S): at 21:05

## 2021-06-08 RX ADMIN — Medication 2000 MILLIGRAM(S): at 06:00

## 2021-06-08 RX ADMIN — Medication 250 MILLILITER(S): at 23:00

## 2021-06-08 RX ADMIN — DEXMEDETOMIDINE HYDROCHLORIDE IN 0.9% SODIUM CHLORIDE 4.95 MICROGRAM(S)/KG/HR: 4 INJECTION INTRAVENOUS at 06:00

## 2021-06-08 RX ADMIN — CHLORHEXIDINE GLUCONATE 1 APPLICATION(S): 213 SOLUTION TOPICAL at 06:00

## 2021-06-08 RX ADMIN — OXYCODONE HYDROCHLORIDE 10 MILLIGRAM(S): 5 TABLET ORAL at 22:38

## 2021-06-08 RX ADMIN — Medication 1000 MILLIGRAM(S): at 22:20

## 2021-06-08 RX ADMIN — NICARDIPINE HYDROCHLORIDE 25 MG/HR: 30 CAPSULE, EXTENDED RELEASE ORAL at 01:00

## 2021-06-08 RX ADMIN — FENTANYL CITRATE 25 MICROGRAM(S): 50 INJECTION INTRAVENOUS at 06:00

## 2021-06-08 RX ADMIN — HYDROMORPHONE HYDROCHLORIDE 0.5 MILLIGRAM(S): 2 INJECTION INTRAMUSCULAR; INTRAVENOUS; SUBCUTANEOUS at 18:14

## 2021-06-08 RX ADMIN — HEPARIN SODIUM 5000 UNIT(S): 5000 INJECTION INTRAVENOUS; SUBCUTANEOUS at 13:11

## 2021-06-08 RX ADMIN — Medication 3 MILLILITER(S): at 09:56

## 2021-06-08 NOTE — PROGRESS NOTE ADULT - SUBJECTIVE AND OBJECTIVE BOX
CTICU  CRITICAL  CARE  PROCEDURE  NOTE      				     Name of procedure – Flexible fiberoptic bronchoscopy    Indication –   Respiratory failure/pre extubation pulm toilet    Flexible fiberoptic bronchoscopy was performed under propofol and fentanyl sedation while the patient was intubated for controlled mechanical ventilation  Pre-procedural assessment reveals no risk of Tb  Ventilator settings were adjusted to reduce airway pressures to reduce the risk of ptx  The scope was advanced past the ett which was noted to be 2 cm the margaret   The margaret was sharp  Right LL and RML air way were patent , mucopur thick secretions  Lavaged and sent for culture  Left LL air way  with copious purulent secretions, lavaged, and sent for culture  CXR confirmed no pneumothorax post bronch  There were no complications  The patient tolerated the procedure well    Compa Donahue M.D.

## 2021-06-08 NOTE — PROGRESS NOTE ADULT - SUBJECTIVE AND OBJECTIVE BOX
CTICU  CRITICAL  CARE  attending     Hand off received 					   Pertinent clinical, laboratory, radiographic, hemodynamic, echocardiographic, respiratory data, microbiologic data and chart were reviewed and analyzed frequently throughout the course of the day and night  Patient seen and examined with CTS/ SH attending at bedside    Pt is a 61y , Male, post op day # 1 s/p AVR; replacement of aortic root; ascending aorta and maite arch replacement; PFO closure; EF 40%;    post op course c/b    RTOR for bleeding    currently:    remains intubated  Epinephrine weaned off  diuresed  PSV weaning trials  s/p bedside bronchoscopy to clear secretions ( pt with active smoking Hx)    HPI:    61 year old male, ex-smoker (quit 2 months ago), with a past medical history of hypertension, hyperlipidemia, CKD stage 3, TIA (2007), CAD s/p NSTEMI (Jan 2019), drug and alcohol misuse (cocaine/marijuana 30 yr hx), recent hospitalization for pneumonia and heart failure exacerbation, presents for evaluation and management of his valvular disease, dilated aorta, and coronary artery disease. NYHA Class II-III. He is referred by Dr. Gerry Rushing.     [place] [facility] [place] dyspnea on exertion. Since discharge, he states that he is feeling better. He admits to worsening fatigue and lightheaded over the past couple of months with activities. He is able to ambulate 5 blocks prior to onset of symptoms.    Patient underwent cardiac workup which revealed severe AI. He was evaluated by Dr. Treviño on 5/3/21 and was recommended to undergo a cta chest to evaluate thoracic aortic aneurysm. Patient returns today for surgical discussion.     Cardiac Cath 4/15/21:   Left main: angiographically normal   LAD: significant ectasia noted   Distal LAD: moderate diffuse disease   Circumflex: Significant ectasia noted   Distal Circ: severe diffuse disease   RCA: Significant ectasia noted.     MICAELA 5/3/21:    1. Mildly reduced left ventricular systolic function.    2. Normal right ventricular size.    3. Spyycw-tx-[id number] reduced right ventricular systolic function.    4. No LA/RA/MARIS/RAA thrombus seen.    5. Color flow Doppler reveals evidence of a patent foramen ovale with primarily left to right shunting by   color doppler.    6. Mild-to-moderate mitral regurgitation.    7. Fibrocalcific tricuspid aortic valve without significant stenosis. The aortic valve leaflets do not coapt.   There is severe aortic regurgitation. The vena contracta width is 0.60 cm (severe >0.6 cm). There is diastolic flow reversal in the descending aorta supporting severe aortic regurgitation diagnosis.    8. The aortic root is moderately dilated. The aortic root measures 4.60 cm at level of the sinuses of  Valsalva (normal 3.1-3.7 cm for men, 2.7-3.3 cm for women).    9. No pericardial effusion.     CTA chest 5/12/21: aortic root 4.7cm. ascending aorta 4.5cm. incidental finding of distal left atrial appendage incomplete enhancement vs early thrombus formation.     The patient was evaluated by CT surgery and is deemed a surgical candidate for aortic valve replacement, mitral valve repair/placement, possible coronary artery bypass grafting, aortic root and ascending aorta replacement.          , FAMILY HISTORY:  FH: hypertension (Mother, Sibling)    PAST MEDICAL & SURGICAL HISTORY:  Hypertension    Hyperlipidemia    NSTEMI (non-ST elevation myocardial infarction)    CKD (chronic kidney disease)    TIA (transient ischemic attack)    CAD (coronary artery disease)    Aortic regurgitation    Thoracic aortic aneurysm    Aortic insufficiency    Mitral regurgitation    Aortic aneurysm    No significant past surgical history      Patient is a 61y old  Male who presents with a chief complaint of aortic regurgitation, mitral regurgitation, thoracic aortic aneurysm, and coronary artery disease (08 Jun 2021 12:44)      14 system review limited by patient on vent support    Vital signs, hemodynamic and respiratory parameters were reviewed from the bedside nursing flowsheet.  ICU Vital Signs Last 24 Hrs  T(C): 37.3 (08 Jun 2021 09:00), Max: 37.3 (08 Jun 2021 09:00)  T(F): 99.1 (08 Jun 2021 09:00), Max: 99.1 (08 Jun 2021 09:00)  HR: 75 (08 Jun 2021 12:00) (58 - 84)  BP: --  BP(mean): --  ABP: 0/0 (08 Jun 2021 12:00) (0/0 - 163/69)  ABP(mean): 0 (08 Jun 2021 12:00) (0 - 151)  RR: 18 (08 Jun 2021 12:00) (14 - 22)  SpO2: 100% (08 Jun 2021 12:00) (95% - 100%)    Adult Advanced Hemodynamics Last 24 Hrs  CVP(mm Hg): 9 (08 Jun 2021 12:00) (5 - 189)  CVP(cm H2O): --  CO: 5.5 (08 Jun 2021 12:00) (4.2 - 6.1)  CI: 2.4 (08 Jun 2021 12:00) (1.9 - 2.7)  PA: 32/17 (08 Jun 2021 12:00) (12/5 - 45/27)  PA(mean): 22 (08 Jun 2021 12:00) (9 - 33)  PCWP: --  SVR: 1176 (08 Jun 2021 12:00) (1047 - 1750)  SVRI: 2696 (08 Jun 2021 12:00) (4467 - 3910)  PVR: --  PVRI: --, ABG - ( 08 Jun 2021 12:39 )  pH, Arterial: 7.41  pH, Blood: x     /  pCO2: 35    /  pO2: 80    / HCO3: 22    / Base Excess: -1.9  /  SaO2: 98.0              Mode: Spontaneous/ CPAP (Continuous Positive Airway Pressure)  FiO2: 40  PEEP: 5  PS: 10  MAP: 10  PIP: 15    Intake and output was reviewed and the fluid balance was calculated  Daily     Daily   I&O's Summary    07 Jun 2021 07:01  -  08 Jun 2021 07:00  --------------------------------------------------------  IN: 2674.7 mL / OUT: 3735 mL / NET: -1060.3 mL    08 Jun 2021 07:01  -  08 Jun 2021 12:47  --------------------------------------------------------  IN: 108 mL / OUT: 805 mL / NET: -697 mL        All lines and drain sites were assessed  Glycemic trend was reviewedCAPILLARY BLOOD GLUCOSE      POCT Blood Glucose.: 106 mg/dL (08 Jun 2021 12:23)    No acute change in mental status  Auscultation of the chest reveals equal bs  Abdomen is soft  Extremities are warm and well perfused  Wounds appear clean and unremarkable  Antibiotics are periop    labs  CBC Full  -  ( 08 Jun 2021 03:11 )  WBC Count : 10.65 K/uL  RBC Count : 3.21 M/uL  Hemoglobin : 9.4 g/dL  Hematocrit : 28.0 %  Platelet Count - Automated : 177 K/uL  Mean Cell Volume : 87.2 fl  Mean Cell Hemoglobin : 29.3 pg  Mean Cell Hemoglobin Concentration : 33.6 gm/dL  Auto Neutrophil # : x  Auto Lymphocyte # : x  Auto Monocyte # : x  Auto Eosinophil # : x  Auto Basophil # : x  Auto Neutrophil % : x  Auto Lymphocyte % : x  Auto Monocyte % : x  Auto Eosinophil % : x  Auto Basophil % : x    06-08    139  |  106  |  18  ----------------------------<  125<H>  4.3   |  22  |  1.12    Ca    7.8<L>      08 Jun 2021 03:11  Phos  2.6     06-08  Mg     2.3     06-08    TPro  5.7<L>  /  Alb  3.4  /  TBili  0.6  /  DBili  x   /  AST  37  /  ALT  11  /  AlkPhos  30<L>  06-08    PT/INR - ( 08 Jun 2021 03:11 )   PT: 12.7 sec;   INR: 1.06          PTT - ( 08 Jun 2021 03:11 )  PTT:26.3 sec  The current medications were reviewed   MEDICATIONS  (STANDING):  aspirin enteric coated 81 milliGRAM(s) Oral daily  ceFAZolin  Injectable. 2000 milliGRAM(s) IV Push every 8 hours  chlorhexidine 0.12% Liquid 15 milliLiter(s) Oral Mucosa every 12 hours  chlorhexidine 2% Cloths 1 Application(s) Topical <User Schedule>  dexMEDEtomidine Infusion 0.2 MICROgram(s)/kG/Hr (4.95 mL/Hr) IV Continuous <Continuous>  dextrose 50% Injectable 50 milliLiter(s) IV Push every 15 minutes  dextrose 50% Injectable 25 milliLiter(s) IV Push every 15 minutes  EPINEPHrine    Infusion 0.02 MICROgram(s)/kG/Min (7.42 mL/Hr) IV Continuous <Continuous>  heparin   Injectable 5000 Unit(s) SubCutaneous every 8 hours  insulin regular Infusion 1 Unit(s)/Hr (1 mL/Hr) IV Continuous <Continuous>  niCARdipine Infusion 5 mG/Hr (25 mL/Hr) IV Continuous <Continuous>  pantoprazole    Tablet 40 milliGRAM(s) Oral once  propofol Infusion 10 MICROgram(s)/kG/Min (5.93 mL/Hr) IV Continuous <Continuous>  sodium chloride 0.9%. 1000 milliLiter(s) (10 mL/Hr) IV Continuous <Continuous>  vasopressin Infusion 0.033 Unit(s)/Min (2 mL/Hr) IV Continuous <Continuous>    MEDICATIONS  (PRN):  albuterol/ipratropium for Nebulization 3 milliLiter(s) Nebulizer every 6 hours PRN Respirations Above___  fentaNYL    Injectable 25 MICROGram(s) IV Push every 3 hours PRN Severe Pain (7 - 10)       PROBLEM LIST/ ASSESSMENT:  HEALTH ISSUES - PROBLEM Dx:      ,   Patient is a 61y old  Male who presents with a chief complaint of aortic regurgitation, mitral regurgitation, thoracic aortic aneurysm, and coronary artery disease (08 Jun 2021 12:44)     s/p  AVR; replacement of aortic root; ascending aorta and maite arch replacement; PFO closure; EF 40%;        My plan includes :    CV:    Maintain CI > 2.2;  Monitor off inotrope support   Diurese to maintain negative fluid balance  Titrate pressor support to maintain MAP >70 ( hx of TIA and CKD )    Pulm:    s/p bronchoscopy to aspirate secretions  wean and extubate  serial ABGs      close hemodynamic, ventilatory and drain monitoring and management per post op routine    Monitor for arrhythmias and monitor parameters for organ perfusion  monitor neurologic status  Head of the bed should remain elevated to 45 deg .   chest PT and IS will be encouraged  monitor adequacy of oxygenation and ventilation and attempt to wean oxygen  Nutritional goals will be met using po eventually , ensure adequate caloric intake and montior the same  Stress ulcer and VTE prophylaxis will be achieved    Glycemic control is satisfactory  Electrolytes have been repleted as necessary and wound care has been carried out. Pain control has been achieved.   agressive physical therapy and early mobility and ambulation goals will be met   The family was updated about the course and plan  CRITICAL CARE TIME SPENT in evaluation and management, reassessments, review and interpretation of labs and x-rays, ventilator and hemodynamic management, formulating a plan and coordinating care: __120___ MIN.  Time does not include procedural time.  CTICU ATTENDING     					    Compa Donahue MD

## 2021-06-08 NOTE — PROGRESS NOTE ADULT - SUBJECTIVE AND OBJECTIVE BOX
While in the ICU this evening following evening sign-off with the daytime intensivist, I provided an additional 30 minutes of critical care time as documented below:  Patient returned form 2nd OR case, hemodynamically stable with good CI on 0.03 of epinephrine, will wean as tolerated by CI. A sensed, V paced, having nonperfusing PVCs at higher epi doses, weaned down. Remains intubated, goal to wean as possible pending continued hemodynamic stability for goal to extubate. SVR elevated, CI 1.9, afterload reduced with nicardipine to preserve forward flow. Chest tube output significantly decreased, monitor for signs of bleeding (required FEIBA x 2 today). Continue insulin gtt for strict BGL control.   This included chart review, documentation, obtaining additional history when needed, review of test results, and discussions with the multidisciplinary ICU team. This did not include time spent performing separately documented procedures or time treating multiple patients simultaneously. This time includes only time spent directly engaged in work dedicated to this individual patient wither at the bedside or elsewhere in the ICU while the patient was critically ill.  Yuri Garcia MD  Cardiothoracic Critical Care

## 2021-06-09 LAB
ALBUMIN SERPL ELPH-MCNC: 3.2 G/DL — LOW (ref 3.3–5)
ALBUMIN SERPL ELPH-MCNC: 3.7 G/DL — SIGNIFICANT CHANGE UP (ref 3.3–5)
ALP SERPL-CCNC: 36 U/L — LOW (ref 40–120)
ALP SERPL-CCNC: 37 U/L — LOW (ref 40–120)
ALT FLD-CCNC: 10 U/L — SIGNIFICANT CHANGE UP (ref 10–45)
ALT FLD-CCNC: 9 U/L — LOW (ref 10–45)
ANION GAP SERPL CALC-SCNC: 8 MMOL/L — SIGNIFICANT CHANGE UP (ref 5–17)
ANION GAP SERPL CALC-SCNC: 9 MMOL/L — SIGNIFICANT CHANGE UP (ref 5–17)
APTT BLD: 24.9 SEC — LOW (ref 27.5–35.5)
APTT BLD: 27.1 SEC — LOW (ref 27.5–35.5)
AST SERPL-CCNC: 30 U/L — SIGNIFICANT CHANGE UP (ref 10–40)
AST SERPL-CCNC: 32 U/L — SIGNIFICANT CHANGE UP (ref 10–40)
BILIRUB SERPL-MCNC: 0.4 MG/DL — SIGNIFICANT CHANGE UP (ref 0.2–1.2)
BILIRUB SERPL-MCNC: 0.4 MG/DL — SIGNIFICANT CHANGE UP (ref 0.2–1.2)
BUN SERPL-MCNC: 18 MG/DL — SIGNIFICANT CHANGE UP (ref 7–23)
BUN SERPL-MCNC: 20 MG/DL — SIGNIFICANT CHANGE UP (ref 7–23)
CALCIUM SERPL-MCNC: 8.3 MG/DL — LOW (ref 8.4–10.5)
CALCIUM SERPL-MCNC: 8.6 MG/DL — SIGNIFICANT CHANGE UP (ref 8.4–10.5)
CHLORIDE SERPL-SCNC: 107 MMOL/L — SIGNIFICANT CHANGE UP (ref 96–108)
CHLORIDE SERPL-SCNC: 107 MMOL/L — SIGNIFICANT CHANGE UP (ref 96–108)
CO2 SERPL-SCNC: 26 MMOL/L — SIGNIFICANT CHANGE UP (ref 22–31)
CO2 SERPL-SCNC: 27 MMOL/L — SIGNIFICANT CHANGE UP (ref 22–31)
CREAT SERPL-MCNC: 0.96 MG/DL — SIGNIFICANT CHANGE UP (ref 0.5–1.3)
CREAT SERPL-MCNC: 0.98 MG/DL — SIGNIFICANT CHANGE UP (ref 0.5–1.3)
GAS PNL BLDA: SIGNIFICANT CHANGE UP
GAS PNL BLDA: SIGNIFICANT CHANGE UP
GLUCOSE BLDC GLUCOMTR-MCNC: 110 MG/DL — HIGH (ref 70–99)
GLUCOSE BLDC GLUCOMTR-MCNC: 121 MG/DL — HIGH (ref 70–99)
GLUCOSE BLDC GLUCOMTR-MCNC: 123 MG/DL — HIGH (ref 70–99)
GLUCOSE SERPL-MCNC: 127 MG/DL — HIGH (ref 70–99)
GLUCOSE SERPL-MCNC: 141 MG/DL — HIGH (ref 70–99)
HCT VFR BLD CALC: 25.9 % — LOW (ref 39–50)
HCT VFR BLD CALC: 26.5 % — LOW (ref 39–50)
HGB BLD-MCNC: 8.4 G/DL — LOW (ref 13–17)
HGB BLD-MCNC: 8.9 G/DL — LOW (ref 13–17)
INR BLD: 1.11 — SIGNIFICANT CHANGE UP (ref 0.88–1.16)
INR BLD: 1.11 — SIGNIFICANT CHANGE UP (ref 0.88–1.16)
MAGNESIUM SERPL-MCNC: 2 MG/DL — SIGNIFICANT CHANGE UP (ref 1.6–2.6)
MAGNESIUM SERPL-MCNC: 2 MG/DL — SIGNIFICANT CHANGE UP (ref 1.6–2.6)
MCHC RBC-ENTMCNC: 28.3 PG — SIGNIFICANT CHANGE UP (ref 27–34)
MCHC RBC-ENTMCNC: 29.1 PG — SIGNIFICANT CHANGE UP (ref 27–34)
MCHC RBC-ENTMCNC: 32.4 GM/DL — SIGNIFICANT CHANGE UP (ref 32–36)
MCHC RBC-ENTMCNC: 33.6 GM/DL — SIGNIFICANT CHANGE UP (ref 32–36)
MCV RBC AUTO: 86.6 FL — SIGNIFICANT CHANGE UP (ref 80–100)
MCV RBC AUTO: 87.2 FL — SIGNIFICANT CHANGE UP (ref 80–100)
NRBC # BLD: 0 /100 WBCS — SIGNIFICANT CHANGE UP (ref 0–0)
NRBC # BLD: 0 /100 WBCS — SIGNIFICANT CHANGE UP (ref 0–0)
PHOSPHATE SERPL-MCNC: 2.4 MG/DL — LOW (ref 2.5–4.5)
PHOSPHATE SERPL-MCNC: 2.8 MG/DL — SIGNIFICANT CHANGE UP (ref 2.5–4.5)
PLATELET # BLD AUTO: 148 K/UL — LOW (ref 150–400)
PLATELET # BLD AUTO: 150 K/UL — SIGNIFICANT CHANGE UP (ref 150–400)
POTASSIUM SERPL-MCNC: 3.6 MMOL/L — SIGNIFICANT CHANGE UP (ref 3.5–5.3)
POTASSIUM SERPL-MCNC: 4 MMOL/L — SIGNIFICANT CHANGE UP (ref 3.5–5.3)
POTASSIUM SERPL-SCNC: 3.6 MMOL/L — SIGNIFICANT CHANGE UP (ref 3.5–5.3)
POTASSIUM SERPL-SCNC: 4 MMOL/L — SIGNIFICANT CHANGE UP (ref 3.5–5.3)
PROT SERPL-MCNC: 6.3 G/DL — SIGNIFICANT CHANGE UP (ref 6–8.3)
PROT SERPL-MCNC: 6.3 G/DL — SIGNIFICANT CHANGE UP (ref 6–8.3)
PROTHROM AB SERPL-ACNC: 13.2 SEC — SIGNIFICANT CHANGE UP (ref 10.6–13.6)
PROTHROM AB SERPL-ACNC: 13.3 SEC — SIGNIFICANT CHANGE UP (ref 10.6–13.6)
RBC # BLD: 2.97 M/UL — LOW (ref 4.2–5.8)
RBC # BLD: 3.06 M/UL — LOW (ref 4.2–5.8)
RBC # FLD: 16.2 % — HIGH (ref 10.3–14.5)
RBC # FLD: 16.5 % — HIGH (ref 10.3–14.5)
SODIUM SERPL-SCNC: 142 MMOL/L — SIGNIFICANT CHANGE UP (ref 135–145)
SODIUM SERPL-SCNC: 142 MMOL/L — SIGNIFICANT CHANGE UP (ref 135–145)
WBC # BLD: 15.73 K/UL — HIGH (ref 3.8–10.5)
WBC # BLD: 17.03 K/UL — HIGH (ref 3.8–10.5)
WBC # FLD AUTO: 15.73 K/UL — HIGH (ref 3.8–10.5)
WBC # FLD AUTO: 17.03 K/UL — HIGH (ref 3.8–10.5)

## 2021-06-09 PROCEDURE — 99292 CRITICAL CARE ADDL 30 MIN: CPT

## 2021-06-09 PROCEDURE — 99291 CRITICAL CARE FIRST HOUR: CPT

## 2021-06-09 PROCEDURE — 71045 X-RAY EXAM CHEST 1 VIEW: CPT | Mod: 26

## 2021-06-09 RX ORDER — POTASSIUM CHLORIDE 20 MEQ
20 PACKET (EA) ORAL ONCE
Refills: 0 | Status: COMPLETED | OUTPATIENT
Start: 2021-06-09 | End: 2021-06-09

## 2021-06-09 RX ORDER — CARVEDILOL PHOSPHATE 80 MG/1
6.25 CAPSULE, EXTENDED RELEASE ORAL EVERY 12 HOURS
Refills: 0 | Status: DISCONTINUED | OUTPATIENT
Start: 2021-06-09 | End: 2021-06-10

## 2021-06-09 RX ORDER — POTASSIUM CHLORIDE 20 MEQ
20 PACKET (EA) ORAL
Refills: 0 | Status: COMPLETED | OUTPATIENT
Start: 2021-06-09 | End: 2021-06-09

## 2021-06-09 RX ORDER — MAGNESIUM SULFATE 500 MG/ML
2 VIAL (ML) INJECTION ONCE
Refills: 0 | Status: COMPLETED | OUTPATIENT
Start: 2021-06-09 | End: 2021-06-09

## 2021-06-09 RX ORDER — POTASSIUM PHOSPHATE, MONOBASIC POTASSIUM PHOSPHATE, DIBASIC 236; 224 MG/ML; MG/ML
15 INJECTION, SOLUTION INTRAVENOUS ONCE
Refills: 0 | Status: COMPLETED | OUTPATIENT
Start: 2021-06-09 | End: 2021-06-09

## 2021-06-09 RX ORDER — PANTOPRAZOLE SODIUM 20 MG/1
40 TABLET, DELAYED RELEASE ORAL
Refills: 0 | Status: DISCONTINUED | OUTPATIENT
Start: 2021-06-09 | End: 2021-06-18

## 2021-06-09 RX ORDER — CALCIUM GLUCONATE 100 MG/ML
2 VIAL (ML) INTRAVENOUS ONCE
Refills: 0 | Status: COMPLETED | OUTPATIENT
Start: 2021-06-09 | End: 2021-06-09

## 2021-06-09 RX ORDER — SODIUM CHLORIDE 9 MG/ML
3 INJECTION INTRAMUSCULAR; INTRAVENOUS; SUBCUTANEOUS EVERY 6 HOURS
Refills: 0 | Status: DISCONTINUED | OUTPATIENT
Start: 2021-06-09 | End: 2021-06-18

## 2021-06-09 RX ORDER — AMIODARONE HYDROCHLORIDE 400 MG/1
150 TABLET ORAL ONCE
Refills: 0 | Status: COMPLETED | OUTPATIENT
Start: 2021-06-09 | End: 2021-06-09

## 2021-06-09 RX ORDER — FUROSEMIDE 40 MG
20 TABLET ORAL ONCE
Refills: 0 | Status: COMPLETED | OUTPATIENT
Start: 2021-06-09 | End: 2021-06-09

## 2021-06-09 RX ADMIN — Medication 650 MILLIGRAM(S): at 06:31

## 2021-06-09 RX ADMIN — Medication 100 MILLIEQUIVALENT(S): at 21:47

## 2021-06-09 RX ADMIN — OXYCODONE HYDROCHLORIDE 10 MILLIGRAM(S): 5 TABLET ORAL at 06:32

## 2021-06-09 RX ADMIN — Medication 100 MILLIEQUIVALENT(S): at 06:48

## 2021-06-09 RX ADMIN — CARVEDILOL PHOSPHATE 6.25 MILLIGRAM(S): 80 CAPSULE, EXTENDED RELEASE ORAL at 18:20

## 2021-06-09 RX ADMIN — POTASSIUM PHOSPHATE, MONOBASIC POTASSIUM PHOSPHATE, DIBASIC 62.5 MILLIMOLE(S): 236; 224 INJECTION, SOLUTION INTRAVENOUS at 06:49

## 2021-06-09 RX ADMIN — Medication 650 MILLIGRAM(S): at 04:51

## 2021-06-09 RX ADMIN — Medication 20 MILLIGRAM(S): at 15:47

## 2021-06-09 RX ADMIN — NICARDIPINE HYDROCHLORIDE 25 MG/HR: 30 CAPSULE, EXTENDED RELEASE ORAL at 01:25

## 2021-06-09 RX ADMIN — Medication 100 MILLIEQUIVALENT(S): at 05:01

## 2021-06-09 RX ADMIN — OXYCODONE HYDROCHLORIDE 10 MILLIGRAM(S): 5 TABLET ORAL at 04:51

## 2021-06-09 RX ADMIN — OXYCODONE HYDROCHLORIDE 10 MILLIGRAM(S): 5 TABLET ORAL at 16:00

## 2021-06-09 RX ADMIN — CARVEDILOL PHOSPHATE 6.25 MILLIGRAM(S): 80 CAPSULE, EXTENDED RELEASE ORAL at 09:01

## 2021-06-09 RX ADMIN — Medication 200 GRAM(S): at 05:40

## 2021-06-09 RX ADMIN — Medication 81 MILLIGRAM(S): at 13:03

## 2021-06-09 RX ADMIN — NICARDIPINE HYDROCHLORIDE 25 MG/HR: 30 CAPSULE, EXTENDED RELEASE ORAL at 05:40

## 2021-06-09 RX ADMIN — SODIUM CHLORIDE 3 MILLILITER(S): 9 INJECTION INTRAMUSCULAR; INTRAVENOUS; SUBCUTANEOUS at 18:02

## 2021-06-09 RX ADMIN — OXYCODONE HYDROCHLORIDE 10 MILLIGRAM(S): 5 TABLET ORAL at 15:06

## 2021-06-09 RX ADMIN — CHLORHEXIDINE GLUCONATE 1 APPLICATION(S): 213 SOLUTION TOPICAL at 05:50

## 2021-06-09 RX ADMIN — HEPARIN SODIUM 5000 UNIT(S): 5000 INJECTION INTRAVENOUS; SUBCUTANEOUS at 14:30

## 2021-06-09 RX ADMIN — OXYCODONE HYDROCHLORIDE 10 MILLIGRAM(S): 5 TABLET ORAL at 21:24

## 2021-06-09 RX ADMIN — AMIODARONE HYDROCHLORIDE 133.33 MILLIGRAM(S): 400 TABLET ORAL at 20:20

## 2021-06-09 RX ADMIN — HEPARIN SODIUM 5000 UNIT(S): 5000 INJECTION INTRAVENOUS; SUBCUTANEOUS at 21:24

## 2021-06-09 RX ADMIN — NICARDIPINE HYDROCHLORIDE 25 MG/HR: 30 CAPSULE, EXTENDED RELEASE ORAL at 06:00

## 2021-06-09 RX ADMIN — POLYETHYLENE GLYCOL 3350 17 GRAM(S): 17 POWDER, FOR SOLUTION ORAL at 13:03

## 2021-06-09 RX ADMIN — OXYCODONE HYDROCHLORIDE 10 MILLIGRAM(S): 5 TABLET ORAL at 21:54

## 2021-06-09 RX ADMIN — PANTOPRAZOLE SODIUM 40 MILLIGRAM(S): 20 TABLET, DELAYED RELEASE ORAL at 13:03

## 2021-06-09 RX ADMIN — Medication 2000 MILLIGRAM(S): at 04:51

## 2021-06-09 RX ADMIN — Medication 50 GRAM(S): at 21:46

## 2021-06-09 RX ADMIN — HEPARIN SODIUM 5000 UNIT(S): 5000 INJECTION INTRAVENOUS; SUBCUTANEOUS at 05:00

## 2021-06-09 NOTE — PROGRESS NOTE ADULT - SUBJECTIVE AND OBJECTIVE BOX
CTICU  CRITICAL  CARE  attending     Hand off received 					   Pertinent clinical, laboratory, radiographic, hemodynamic, echocardiographic, respiratory data, microbiologic data and chart were reviewed and analyzed frequently throughout the course of the day and night  Patient seen and examined with CTS/ SH attending at bedside  Pt is a 61y , Male, HEALTH ISSUES - PROBLEM Dx:      Interval Hx: off nicardipine, on coreg, dc swan, CT plan for dc am d3, IS 75o, 3% nebs  walk+, up in chair all day. lasix prn      HPI:    61 year old male, ex-smoker (quit 2 months ago), with a past medical history of hypertension, hyperlipidemia, CKD stage 3, TIA (2007), CAD s/p NSTEMI (Jan 2019), drug and alcohol misuse (cocaine/marijuana 30 yr hx), recent hospitalization for pneumonia and heart failure exacerbation, presents for evaluation and management of his valvular disease, dilated aorta, and coronary artery disease. NYHA Class II-III. He is referred by Dr. Gerry Rushing    , FAMILY HISTORY:  FH: hypertension (Mother, Sibling)    PAST MEDICAL & SURGICAL HISTORY:  Hypertension    Hyperlipidemia    NSTEMI (non-ST elevation myocardial infarction)    CKD (chronic kidney disease)    TIA (transient ischemic attack)    CAD (coronary artery disease)    Aortic regurgitation    Thoracic aortic aneurysm    Aortic insufficiency    Mitral regurgitation    Aortic aneurysm    No significant past surgical history      Patient is a 61y old  Male who presents with a chief complaint of aortic regurgitation, mitral regurgitation, thoracic aortic aneurysm, and coronary artery disease (09 Jun 2021 02:07)      14 system review limited by mentation and multiorgan morbidity     Vital signs, hemodynamic and respiratory parameters were reviewed from the bedside nursing flowsheet.  ICU Vital Signs Last 24 Hrs  T(C): 36.8 (09 Jun 2021 13:45), Max: 36.8 (09 Jun 2021 05:01)  T(F): 98.2 (09 Jun 2021 13:45), Max: 98.2 (09 Jun 2021 05:01)  HR: 83 (09 Jun 2021 16:00) (77 - 90)  BP: 136/75 (08 Jun 2021 20:35) (136/75 - 136/75)  BP(mean): 99 (08 Jun 2021 20:35) (99 - 99)  ABP: 128/62 (09 Jun 2021 16:00) (98/64 - 136/66)  ABP(mean): 84 (09 Jun 2021 16:00) (73 - 103)  RR: 14 (09 Jun 2021 16:00) (14 - 20)  SpO2: 99% (09 Jun 2021 16:00) (95% - 99%)    Adult Advanced Hemodynamics Last 24 Hrs  CVP(mm Hg): 5 (09 Jun 2021 09:00) (1 - 6)  CVP(cm H2O): --  CO: 9.7 (09 Jun 2021 00:00) (7.2 - 9.7)  CI: 4.3 (09 Jun 2021 00:00) (3.3 - 4.3)  PA: 32/11 (09 Jun 2021 09:00) (27/6 - 35/13)  PA(mean): 18 (09 Jun 2021 09:00) (14 - 21)  PCWP: --  SVR: 601 (09 Jun 2021 00:00) (601 - 954)  SVRI: 1356 (09 Jun 2021 00:00) (1356 - 2082)  PVR: --  PVRI: --, ABG - ( 09 Jun 2021 12:30 )  pH, Arterial: 7.42  pH, Blood: x     /  pCO2: 41    /  pO2: 151   / HCO3: 27    / Base Excess: 1.9   /  SaO2: 98.1                Intake and output was reviewed and the fluid balance was calculated  Daily     Daily   I&O's Summary    08 Jun 2021 07:01  -  09 Jun 2021 07:00  --------------------------------------------------------  IN: 1976 mL / OUT: 4365 mL / NET: -2389 mL    09 Jun 2021 07:01  -  09 Jun 2021 17:06  --------------------------------------------------------  IN: 460 mL / OUT: 915 mL / NET: -455 mL        All lines and drain sites were assessed  Glycemic trend was reviewedCAPILLARY BLOOD GLUCOSE      POCT Blood Glucose.: 121 mg/dL (09 Jun 2021 06:11)    No acute change in focality  Auscultation of the chest reveals equal bs  Abdomen is soft  Extremities are warm and well perfused  Wounds appear clean and unremarkable  Antibiotics are periop    labs  CBC Full  -  ( 09 Jun 2021 12:26 )  WBC Count : 15.73 K/uL  RBC Count : 2.97 M/uL  Hemoglobin : 8.4 g/dL  Hematocrit : 25.9 %  Platelet Count - Automated : 148 K/uL  Mean Cell Volume : 87.2 fl  Mean Cell Hemoglobin : 28.3 pg  Mean Cell Hemoglobin Concentration : 32.4 gm/dL  Auto Neutrophil # : x  Auto Lymphocyte # : x  Auto Monocyte # : x  Auto Eosinophil # : x  Auto Basophil # : x  Auto Neutrophil % : x  Auto Lymphocyte % : x  Auto Monocyte % : x  Auto Eosinophil % : x  Auto Basophil % : x    06-09    142  |  107  |  20  ----------------------------<  141<H>  4.0   |  26  |  0.98    Ca    8.6      09 Jun 2021 12:26  Phos  2.8     06-09  Mg     2.0     06-09    TPro  6.3  /  Alb  3.2<L>  /  TBili  0.4  /  DBili  x   /  AST  30  /  ALT  9<L>  /  AlkPhos  37<L>  06-09    PT/INR - ( 09 Jun 2021 12:26 )   PT: 13.3 sec;   INR: 1.11          PTT - ( 09 Jun 2021 12:26 )  PTT:24.9 sec  The current medications were reviewed   MEDICATIONS  (STANDING):  aspirin enteric coated 81 milliGRAM(s) Oral daily  carvedilol 6.25 milliGRAM(s) Oral every 12 hours  chlorhexidine 2% Cloths 1 Application(s) Topical <User Schedule>  dextrose 40% Gel 15 Gram(s) Oral once  dextrose 5%. 1000 milliLiter(s) (50 mL/Hr) IV Continuous <Continuous>  dextrose 5%. 1000 milliLiter(s) (100 mL/Hr) IV Continuous <Continuous>  dextrose 50% Injectable 25 Gram(s) IV Push once  dextrose 50% Injectable 12.5 Gram(s) IV Push once  dextrose 50% Injectable 25 Gram(s) IV Push once  glucagon  Injectable 1 milliGRAM(s) IntraMuscular once  heparin   Injectable 5000 Unit(s) SubCutaneous every 8 hours  insulin lispro (ADMELOG) corrective regimen sliding scale   SubCutaneous Before meals and at bedtime  pantoprazole    Tablet 40 milliGRAM(s) Oral before breakfast  polyethylene glycol 3350 17 Gram(s) Oral daily  sodium chloride 0.9%. 1000 milliLiter(s) (10 mL/Hr) IV Continuous <Continuous>  sodium chloride 3%  Inhalation 3 milliLiter(s) Inhalation every 6 hours    MEDICATIONS  (PRN):  acetaminophen   Tablet .. 650 milliGRAM(s) Oral every 6 hours PRN Mild Pain (1 - 3)  albuterol/ipratropium for Nebulization 3 milliLiter(s) Nebulizer every 6 hours PRN Respirations Above___  fentaNYL    Injectable 25 MICROGram(s) IV Push every 3 hours PRN Severe Pain (7 - 10)  oxyCODONE    IR 5 milliGRAM(s) Oral every 4 hours PRN Moderate Pain (4 - 6)  oxyCODONE    IR 10 milliGRAM(s) Oral every 6 hours PRN Severe Pain (7 - 10)       PROBLEM LIST/ ASSESSMENT:  HEALTH ISSUES - PROBLEM Dx:      ,   Patient is a 61y old  Male who presents with a chief complaint of aortic regurgitation, mitral regurgitation, thoracic aortic aneurysm, and coronary artery disease (09 Jun 2021 02:07)     s/p cardiac surgery      My plan includes :  close hemodynamic, ventilatory and drain monitoring and management per post op routine    Monitor for arrhythmias and monitor parameters for organ perfusion  beta blockade not administered due to hemodynamic instability and bradycardia  monitor neurologic status  Head of the bed should remain elevated to 45 deg .   chest PT and IS will be encouraged  monitor adequacy of oxygenation and ventilation and attempt to wean oxygen  antibiotic regimen will be tailored to the clinical, laboratory and microbiologic data  Nutritional goals will be met using po eventually , ensure adequate caloric intake and montior the same  Stress ulcer and VTE prophylaxis will be achieved    Glycemic control is satisfactory  Electrolytes have been repleted as necessary and wound care has been carried out. Pain control has been achieved.   agressive physical therapy and early mobility and ambulation goals will be met   The family was updated about the course and plan  CRITICAL CARE TIME personally provided by me  in evaluation and management, reassessments, review and interpretation of labs and x-rays, ventilator and hemodynamic management, formulating a plan and coordinating care: ___90____ MIN.  Time does not include procedural time. Time spent was non routine post-operarive caRE and included multiple and repeated evaluations at the bedside  CTICU ATTENDING     					  Noe Hooker MD

## 2021-06-09 NOTE — PROGRESS NOTE ADULT - SUBJECTIVE AND OBJECTIVE BOX
While in the ICU this evening following evening sign-off with the daytime intensivist, I provided an additional 30 minutes of critical care time as documented below:  Weaned to extubation today after bronchoscopy, having continued productive cough. Continue to wan respiratory support from 6L NC as tolerated. Weaning cardene as able to off, currently at 10  This included chart review, documentation, obtaining additional history when needed, review of test results, and discussions with the multidisciplinary ICU team. This did not include time spent performing separately documented procedures or time treating multiple patients simultaneously. This time includes only time spent directly engaged in work dedicated to this individual patient wither at the bedside or elsewhere in the ICU while the patient was critically ill.  Yuri Garcia MD  Cardiothoracic Critical Care   While in the ICU this evening following evening sign-off with the daytime intensivist, I provided an additional 30 minutes of critical care time as documented below:  Weaned to extubation today after bronchoscopy, having continued productive cough. Continue to wan respiratory support from 6L NC as tolerated. Weaning cardene as able to off, currently at 10, with goal to tolerate MAP on higher range due to prior TIA and CKD history, goal MAP 70-90  This included chart review, documentation, obtaining additional history when needed, review of test results, and discussions with the multidisciplinary ICU team. This did not include time spent performing separately documented procedures or time treating multiple patients simultaneously. This time includes only time spent directly engaged in work dedicated to this individual patient wither at the bedside or elsewhere in the ICU while the patient was critically ill.  Yuri Garcia MD  Cardiothoracic Critical Care

## 2021-06-09 NOTE — PHYSICAL THERAPY INITIAL EVALUATION ADULT - TRANSFER SAFETY CONCERNS NOTED: BED/CHAIR, REHAB EVAL
decreased balance during turns/decreased safety awareness/losing balance/decreased sequencing ability/decreased step length

## 2021-06-09 NOTE — PROGRESS NOTE ADULT - SUBJECTIVE AND OBJECTIVE BOX
CTICU  CRITICAL  CARE  attending     Hand off received 					   Pertinent clinical, laboratory, radiographic, hemodynamic, echocardiographic, respiratory data, microbiologic data and chart were reviewed and analyzed frequently throughout the course of the day and night  Patient seen and examined with CTS/ SH attending at bedside    Pt is a 61y , Male, post op day # 2 s/p AVR; replacement of aortic root; ascending aorta and maite arch replacement; PFO closure; EF 40%;    post op course c/b    RTOR for bleeding    post op    remains iextubated  Epinephrine weaned off  diuresed  s/p bedside bronchoscopy to clear secretions ( pt with active smoking Hx)    today:    brief run of atrial fibrillation with RVR in the evening  s/p amiodarone 150 mg bolus  diuresed    HPI:    61 year old male, ex-smoker (quit 2 months ago), with a past medical history of hypertension, hyperlipidemia, CKD stage 3, TIA (2007), CAD s/p NSTEMI (Jan 2019), drug and alcohol misuse (cocaine/marijuana 30 yr hx), recent hospitalization for pneumonia and heart failure exacerbation, presents for evaluation and management of his valvular disease, dilated aorta, and coronary artery disease. NYHA Class II-III. He is referred by Dr. Gerry Rushing.         , FAMILY HISTORY:  FH: hypertension (Mother, Sibling)    PAST MEDICAL & SURGICAL HISTORY:  Hypertension    Hyperlipidemia    NSTEMI (non-ST elevation myocardial infarction)    CKD (chronic kidney disease)    TIA (transient ischemic attack)    CAD (coronary artery disease)    Aortic regurgitation    Thoracic aortic aneurysm    Aortic insufficiency    Mitral regurgitation    Aortic aneurysm    No significant past surgical history      Patient is a 61y old  Male who presents with a chief complaint of aortic regurgitation, mitral regurgitation, thoracic aortic aneurysm, and coronary artery disease (09 Jun 2021 17:05)      14 system review limited by post op state    Vital signs, hemodynamic and respiratory parameters were reviewed from the bedside nursing flowsheet.  ICU Vital Signs Last 24 Hrs  T(C): 36.9 (10 Tan 2021 01:01), Max: 37.3 (09 Jun 2021 21:01)  T(F): 98.4 (10 Tan 2021 01:01), Max: 99.1 (09 Jun 2021 21:01)  HR: 93 (10 Tan 2021 03:00) (78 - 93)  BP: --  BP(mean): --  ABP: 148/70 (10 Tan 2021 03:00) (98/64 - 150/73)  ABP(mean): 98 (10 Tan 2021 03:00) (73 - 103)  RR: 16 (10 Tan 2021 03:00) (14 - 18)  SpO2: 96% (10 Tan 2021 03:00) (94% - 99%)    Adult Advanced Hemodynamics Last 24 Hrs  CVP(mm Hg): 5 (09 Jun 2021 09:00) (1 - 5)  CVP(cm H2O): --  CO: --  CI: --  PA: 32/11 (09 Jun 2021 09:00) (28/7 - 34/12)  PA(mean): 18 (09 Jun 2021 09:00) (14 - 19)  PCWP: --  SVR: --  SVRI: --  PVR: --  PVRI: --, ABG - ( 10 Tan 2021 01:15 )  pH, Arterial: 7.45  pH, Blood: x     /  pCO2: 38    /  pO2: 84    / HCO3: 26    / Base Excess: 2.4   /  SaO2: 99.0                Intake and output was reviewed and the fluid balance was calculated  Daily     Daily   I&O's Summary    08 Jun 2021 07:01  -  09 Jun 2021 07:00  --------------------------------------------------------  IN: 1976 mL / OUT: 4365 mL / NET: -2389 mL    09 Jun 2021 07:01  -  10 Tan 2021 03:28  --------------------------------------------------------  IN: 1180 mL / OUT: 1725 mL / NET: -545 mL        All lines and drain sites were assessed  Glycemic trend was reviewedCAPILLARY BLOOD GLUCOSE      POCT Blood Glucose.: 110 mg/dL (09 Jun 2021 21:27)    No acute change in mental status  Auscultation of the chest reveals equal bs  Abdomen is soft  Extremities are warm and well perfused  Wounds appear clean and unremarkable  Antibiotics are periop    labs  CBC Full  -  ( 10 Tan 2021 01:15 )  WBC Count : 15.76 K/uL  RBC Count : 2.80 M/uL  Hemoglobin : 8.2 g/dL  Hematocrit : 24.6 %  Platelet Count - Automated : 154 K/uL  Mean Cell Volume : 87.9 fl  Mean Cell Hemoglobin : 29.3 pg  Mean Cell Hemoglobin Concentration : 33.3 gm/dL  Auto Neutrophil # : x  Auto Lymphocyte # : x  Auto Monocyte # : x  Auto Eosinophil # : x  Auto Basophil # : x  Auto Neutrophil % : x  Auto Lymphocyte % : x  Auto Monocyte % : x  Auto Eosinophil % : x  Auto Basophil % : x    06-10    137  |  103  |  24<H>  ----------------------------<  110<H>  4.2   |  26  |  1.10    Ca    8.4      10 Tan 2021 01:15  Phos  2.6     06-10  Mg     2.6     06-10    TPro  6.3  /  Alb  3.2<L>  /  TBili  0.4  /  DBili  x   /  AST  30  /  ALT  9<L>  /  AlkPhos  37<L>  06-09    PT/INR - ( 10 Tan 2021 01:15 )   PT: 12.1 sec;   INR: 1.01          PTT - ( 10 Tan 2021 01:15 )  PTT:26.5 sec  The current medications were reviewed   MEDICATIONS  (STANDING):  aspirin enteric coated 81 milliGRAM(s) Oral daily  carvedilol 6.25 milliGRAM(s) Oral every 12 hours  chlorhexidine 2% Cloths 1 Application(s) Topical <User Schedule>  dextrose 40% Gel 15 Gram(s) Oral once  dextrose 5%. 1000 milliLiter(s) (50 mL/Hr) IV Continuous <Continuous>  dextrose 5%. 1000 milliLiter(s) (100 mL/Hr) IV Continuous <Continuous>  dextrose 50% Injectable 25 Gram(s) IV Push once  dextrose 50% Injectable 12.5 Gram(s) IV Push once  dextrose 50% Injectable 25 Gram(s) IV Push once  glucagon  Injectable 1 milliGRAM(s) IntraMuscular once  heparin   Injectable 5000 Unit(s) SubCutaneous every 8 hours  insulin lispro (ADMELOG) corrective regimen sliding scale   SubCutaneous Before meals and at bedtime  pantoprazole    Tablet 40 milliGRAM(s) Oral before breakfast  polyethylene glycol 3350 17 Gram(s) Oral daily  sodium chloride 0.9%. 1000 milliLiter(s) (10 mL/Hr) IV Continuous <Continuous>  sodium chloride 3%  Inhalation 3 milliLiter(s) Inhalation every 6 hours    MEDICATIONS  (PRN):  acetaminophen   Tablet .. 650 milliGRAM(s) Oral every 6 hours PRN Mild Pain (1 - 3)  albuterol/ipratropium for Nebulization 3 milliLiter(s) Nebulizer every 6 hours PRN Respirations Above___  fentaNYL    Injectable 25 MICROGram(s) IV Push every 3 hours PRN Severe Pain (7 - 10)  oxyCODONE    IR 5 milliGRAM(s) Oral every 4 hours PRN Moderate Pain (4 - 6)  oxyCODONE    IR 10 milliGRAM(s) Oral every 6 hours PRN Severe Pain (7 - 10)       PROBLEM LIST/ ASSESSMENT:  HEALTH ISSUES - PROBLEM Dx:      ,   Patient is a 61y old  Male who presents with a chief complaint of aortic regurgitation, mitral regurgitation, thoracic aortic aneurysm, and coronary artery disease (09 Jun 2021 17:05)     s/p  AVR; replacement of aortic root; ascending aorta and maite arch replacement; PFO closure; EF 40%;        My plan includes :  Maintain MAP >80 ( hx of TIA/CKD)  replete lytes to help maintain sinus rhythm  chest pt/pulm toilet/inhalers  diurese to maintain negative balance  close hemodynamic, ventilatory and drain monitoring and management per post op routine    Monitor for arrhythmias and monitor parameters for organ perfusion  monitor neurologic status  Head of the bed should remain elevated to 45 deg .   chest PT and IS will be encouraged  monitor adequacy of oxygenation and ventilation and attempt to wean oxygen  Nutritional goals will be met using po eventually , ensure adequate caloric intake and montior the same  Stress ulcer and VTE prophylaxis will be achieved    Glycemic control is satisfactory  Electrolytes have been repleted as necessary and wound care has been carried out. Pain control has been achieved.   agressive physical therapy and early mobility and ambulation goals will be met   The family was updated about the course and plan  CRITICAL CARE TIME SPENT in evaluation and management, reassessments, review and interpretation of labs and x-rays, ventilator and hemodynamic management, formulating a plan and coordinating care: __30___ MIN.  Time does not include procedural time.  CTICU ATTENDING     					    Compa Donahue MD

## 2021-06-09 NOTE — PHYSICAL THERAPY INITIAL EVALUATION ADULT - PERTINENT HX OF CURRENT PROBLEM, REHAB EVAL
61 year old male, ex-smoker (quit 2 months ago), with a past medical history of hypertension, hyperlipidemia, CKD stage 3, TIA (2007), CAD s/p NSTEMI (Jan 2019), drug and alcohol misuse (cocaine/marijuana 30 yr hx), recent hospitalization for pneumonia and heart failure exacerbation, presents for evaluation and management of his valvular disease, dilated aorta, and coronary artery disease.

## 2021-06-09 NOTE — PHYSICAL THERAPY INITIAL EVALUATION ADULT - GENERAL OBSERVATIONS, REHAB EVAL
Pt received semi-supine no acute distress +tele +A-line +IV +mackenzie +SCDs +rivera +6LO2NC, cleared for PT by ORLIN Atkinson, agreeable to PT Eval. Left seated out of bed no acute distress VSS +call bell, all lines intact, RN present/aware.

## 2021-06-09 NOTE — PHYSICAL THERAPY INITIAL EVALUATION ADULT - GAIT DEVIATIONS NOTED, PT EVAL
decreased deon/increased time in double stance/decreased step length/decreased stride length/decreased weight-shifting ability

## 2021-06-09 NOTE — PHYSICAL THERAPY INITIAL EVALUATION ADULT - IMPAIRMENTS FOUND, PT EVAL
aerobic capacity/endurance/arousal, attention, and cognition/ergonomics and body mechanics/gait, locomotion, and balance/gross motor/muscle strength/poor safety awareness/posture

## 2021-06-09 NOTE — PHYSICAL THERAPY INITIAL EVALUATION ADULT - ADDITIONAL COMMENTS
pt is questionable historian at this time, reports he was independent prior to admission, denies use of Assistive Device or assist at baseline.

## 2021-06-10 LAB
ANION GAP SERPL CALC-SCNC: 10 MMOL/L — SIGNIFICANT CHANGE UP (ref 5–17)
ANION GAP SERPL CALC-SCNC: 8 MMOL/L — SIGNIFICANT CHANGE UP (ref 5–17)
ANION GAP SERPL CALC-SCNC: 8 MMOL/L — SIGNIFICANT CHANGE UP (ref 5–17)
ANION GAP SERPL CALC-SCNC: 9 MMOL/L — SIGNIFICANT CHANGE UP (ref 5–17)
APTT BLD: 26.5 SEC — LOW (ref 27.5–35.5)
BUN SERPL-MCNC: 24 MG/DL — HIGH (ref 7–23)
BUN SERPL-MCNC: 29 MG/DL — HIGH (ref 7–23)
BUN SERPL-MCNC: 29 MG/DL — HIGH (ref 7–23)
BUN SERPL-MCNC: 31 MG/DL — HIGH (ref 7–23)
CALCIUM SERPL-MCNC: 8.1 MG/DL — LOW (ref 8.4–10.5)
CALCIUM SERPL-MCNC: 8.3 MG/DL — LOW (ref 8.4–10.5)
CALCIUM SERPL-MCNC: 8.4 MG/DL — SIGNIFICANT CHANGE UP (ref 8.4–10.5)
CALCIUM SERPL-MCNC: 8.4 MG/DL — SIGNIFICANT CHANGE UP (ref 8.4–10.5)
CHLORIDE SERPL-SCNC: 100 MMOL/L — SIGNIFICANT CHANGE UP (ref 96–108)
CHLORIDE SERPL-SCNC: 102 MMOL/L — SIGNIFICANT CHANGE UP (ref 96–108)
CHLORIDE SERPL-SCNC: 103 MMOL/L — SIGNIFICANT CHANGE UP (ref 96–108)
CHLORIDE SERPL-SCNC: 104 MMOL/L — SIGNIFICANT CHANGE UP (ref 96–108)
CO2 SERPL-SCNC: 25 MMOL/L — SIGNIFICANT CHANGE UP (ref 22–31)
CO2 SERPL-SCNC: 26 MMOL/L — SIGNIFICANT CHANGE UP (ref 22–31)
CREAT SERPL-MCNC: 1.06 MG/DL — SIGNIFICANT CHANGE UP (ref 0.5–1.3)
CREAT SERPL-MCNC: 1.1 MG/DL — SIGNIFICANT CHANGE UP (ref 0.5–1.3)
CREAT SERPL-MCNC: 1.28 MG/DL — SIGNIFICANT CHANGE UP (ref 0.5–1.3)
CREAT SERPL-MCNC: 1.28 MG/DL — SIGNIFICANT CHANGE UP (ref 0.5–1.3)
GAS PNL BLDA: SIGNIFICANT CHANGE UP
GAS PNL BLDA: SIGNIFICANT CHANGE UP
GLUCOSE BLDC GLUCOMTR-MCNC: 144 MG/DL — HIGH (ref 70–99)
GLUCOSE SERPL-MCNC: 109 MG/DL — HIGH (ref 70–99)
GLUCOSE SERPL-MCNC: 110 MG/DL — HIGH (ref 70–99)
GLUCOSE SERPL-MCNC: 166 MG/DL — HIGH (ref 70–99)
GLUCOSE SERPL-MCNC: 167 MG/DL — HIGH (ref 70–99)
HCT VFR BLD CALC: 23.2 % — LOW (ref 39–50)
HCT VFR BLD CALC: 24.6 % — LOW (ref 39–50)
HGB BLD-MCNC: 7.7 G/DL — LOW (ref 13–17)
HGB BLD-MCNC: 8.2 G/DL — LOW (ref 13–17)
INR BLD: 1.01 — SIGNIFICANT CHANGE UP (ref 0.88–1.16)
MAGNESIUM SERPL-MCNC: 2.1 MG/DL — SIGNIFICANT CHANGE UP (ref 1.6–2.6)
MAGNESIUM SERPL-MCNC: 2.2 MG/DL — SIGNIFICANT CHANGE UP (ref 1.6–2.6)
MAGNESIUM SERPL-MCNC: 2.2 MG/DL — SIGNIFICANT CHANGE UP (ref 1.6–2.6)
MAGNESIUM SERPL-MCNC: 2.6 MG/DL — SIGNIFICANT CHANGE UP (ref 1.6–2.6)
MCHC RBC-ENTMCNC: 28.8 PG — SIGNIFICANT CHANGE UP (ref 27–34)
MCHC RBC-ENTMCNC: 29.3 PG — SIGNIFICANT CHANGE UP (ref 27–34)
MCHC RBC-ENTMCNC: 33.2 GM/DL — SIGNIFICANT CHANGE UP (ref 32–36)
MCHC RBC-ENTMCNC: 33.3 GM/DL — SIGNIFICANT CHANGE UP (ref 32–36)
MCV RBC AUTO: 86.9 FL — SIGNIFICANT CHANGE UP (ref 80–100)
MCV RBC AUTO: 87.9 FL — SIGNIFICANT CHANGE UP (ref 80–100)
NRBC # BLD: 0 /100 WBCS — SIGNIFICANT CHANGE UP (ref 0–0)
NRBC # BLD: 0 /100 WBCS — SIGNIFICANT CHANGE UP (ref 0–0)
PHOSPHATE SERPL-MCNC: 2.6 MG/DL — SIGNIFICANT CHANGE UP (ref 2.5–4.5)
PHOSPHATE SERPL-MCNC: 2.7 MG/DL — SIGNIFICANT CHANGE UP (ref 2.5–4.5)
PLATELET # BLD AUTO: 133 K/UL — LOW (ref 150–400)
PLATELET # BLD AUTO: 154 K/UL — SIGNIFICANT CHANGE UP (ref 150–400)
POTASSIUM SERPL-MCNC: 3.6 MMOL/L — SIGNIFICANT CHANGE UP (ref 3.5–5.3)
POTASSIUM SERPL-MCNC: 3.7 MMOL/L — SIGNIFICANT CHANGE UP (ref 3.5–5.3)
POTASSIUM SERPL-MCNC: 4.1 MMOL/L — SIGNIFICANT CHANGE UP (ref 3.5–5.3)
POTASSIUM SERPL-MCNC: 4.2 MMOL/L — SIGNIFICANT CHANGE UP (ref 3.5–5.3)
POTASSIUM SERPL-SCNC: 3.6 MMOL/L — SIGNIFICANT CHANGE UP (ref 3.5–5.3)
POTASSIUM SERPL-SCNC: 3.7 MMOL/L — SIGNIFICANT CHANGE UP (ref 3.5–5.3)
POTASSIUM SERPL-SCNC: 4.1 MMOL/L — SIGNIFICANT CHANGE UP (ref 3.5–5.3)
POTASSIUM SERPL-SCNC: 4.2 MMOL/L — SIGNIFICANT CHANGE UP (ref 3.5–5.3)
PROTHROM AB SERPL-ACNC: 12.1 SEC — SIGNIFICANT CHANGE UP (ref 10.6–13.6)
RBC # BLD: 2.67 M/UL — LOW (ref 4.2–5.8)
RBC # BLD: 2.8 M/UL — LOW (ref 4.2–5.8)
RBC # FLD: 16.2 % — HIGH (ref 10.3–14.5)
RBC # FLD: 16.3 % — HIGH (ref 10.3–14.5)
SODIUM SERPL-SCNC: 135 MMOL/L — SIGNIFICANT CHANGE UP (ref 135–145)
SODIUM SERPL-SCNC: 136 MMOL/L — SIGNIFICANT CHANGE UP (ref 135–145)
SODIUM SERPL-SCNC: 137 MMOL/L — SIGNIFICANT CHANGE UP (ref 135–145)
SODIUM SERPL-SCNC: 139 MMOL/L — SIGNIFICANT CHANGE UP (ref 135–145)
WBC # BLD: 15.76 K/UL — HIGH (ref 3.8–10.5)
WBC # BLD: 16.02 K/UL — HIGH (ref 3.8–10.5)
WBC # FLD AUTO: 15.76 K/UL — HIGH (ref 3.8–10.5)
WBC # FLD AUTO: 16.02 K/UL — HIGH (ref 3.8–10.5)

## 2021-06-10 PROCEDURE — 99292 CRITICAL CARE ADDL 30 MIN: CPT

## 2021-06-10 PROCEDURE — 71045 X-RAY EXAM CHEST 1 VIEW: CPT | Mod: 26

## 2021-06-10 PROCEDURE — 93308 TTE F-UP OR LMTD: CPT | Mod: 26

## 2021-06-10 PROCEDURE — 99291 CRITICAL CARE FIRST HOUR: CPT

## 2021-06-10 RX ORDER — AMIODARONE HYDROCHLORIDE 400 MG/1
TABLET ORAL
Refills: 0 | Status: DISCONTINUED | OUTPATIENT
Start: 2021-06-10 | End: 2021-06-13

## 2021-06-10 RX ORDER — AMIODARONE HYDROCHLORIDE 400 MG/1
150 TABLET ORAL ONCE
Refills: 0 | Status: COMPLETED | OUTPATIENT
Start: 2021-06-10 | End: 2021-06-10

## 2021-06-10 RX ORDER — FUROSEMIDE 40 MG
20 TABLET ORAL ONCE
Refills: 0 | Status: COMPLETED | OUTPATIENT
Start: 2021-06-10 | End: 2021-06-10

## 2021-06-10 RX ORDER — ALBUMIN HUMAN 25 %
50 VIAL (ML) INTRAVENOUS ONCE
Refills: 0 | Status: COMPLETED | OUTPATIENT
Start: 2021-06-10 | End: 2021-06-10

## 2021-06-10 RX ORDER — IPRATROPIUM/ALBUTEROL SULFATE 18-103MCG
3 AEROSOL WITH ADAPTER (GRAM) INHALATION ONCE
Refills: 0 | Status: COMPLETED | OUTPATIENT
Start: 2021-06-10 | End: 2021-06-10

## 2021-06-10 RX ORDER — AMIODARONE HYDROCHLORIDE 400 MG/1
400 TABLET ORAL EVERY 8 HOURS
Refills: 0 | Status: DISCONTINUED | OUTPATIENT
Start: 2021-06-10 | End: 2021-06-13

## 2021-06-10 RX ORDER — CALCIUM GLUCONATE 100 MG/ML
2 VIAL (ML) INTRAVENOUS ONCE
Refills: 0 | Status: COMPLETED | OUTPATIENT
Start: 2021-06-10 | End: 2021-06-10

## 2021-06-10 RX ORDER — CARVEDILOL PHOSPHATE 80 MG/1
12.5 CAPSULE, EXTENDED RELEASE ORAL EVERY 12 HOURS
Refills: 0 | Status: DISCONTINUED | OUTPATIENT
Start: 2021-06-10 | End: 2021-06-12

## 2021-06-10 RX ORDER — CARVEDILOL PHOSPHATE 80 MG/1
3.12 CAPSULE, EXTENDED RELEASE ORAL EVERY 12 HOURS
Refills: 0 | Status: DISCONTINUED | OUTPATIENT
Start: 2021-06-10 | End: 2021-06-10

## 2021-06-10 RX ORDER — POTASSIUM CHLORIDE 20 MEQ
20 PACKET (EA) ORAL
Refills: 0 | Status: COMPLETED | OUTPATIENT
Start: 2021-06-10 | End: 2021-06-11

## 2021-06-10 RX ORDER — IPRATROPIUM BROMIDE 0.2 MG/ML
500 SOLUTION, NON-ORAL INHALATION ONCE
Refills: 0 | Status: COMPLETED | OUTPATIENT
Start: 2021-06-10 | End: 2021-06-11

## 2021-06-10 RX ORDER — POTASSIUM CHLORIDE 20 MEQ
40 PACKET (EA) ORAL ONCE
Refills: 0 | Status: COMPLETED | OUTPATIENT
Start: 2021-06-10 | End: 2021-06-10

## 2021-06-10 RX ORDER — ATORVASTATIN CALCIUM 80 MG/1
40 TABLET, FILM COATED ORAL AT BEDTIME
Refills: 0 | Status: DISCONTINUED | OUTPATIENT
Start: 2021-06-10 | End: 2021-06-18

## 2021-06-10 RX ADMIN — Medication 200 GRAM(S): at 04:46

## 2021-06-10 RX ADMIN — FENTANYL CITRATE 25 MICROGRAM(S): 50 INJECTION INTRAVENOUS at 21:15

## 2021-06-10 RX ADMIN — POLYETHYLENE GLYCOL 3350 17 GRAM(S): 17 POWDER, FOR SOLUTION ORAL at 12:36

## 2021-06-10 RX ADMIN — CARVEDILOL PHOSPHATE 3.12 MILLIGRAM(S): 80 CAPSULE, EXTENDED RELEASE ORAL at 17:53

## 2021-06-10 RX ADMIN — AMIODARONE HYDROCHLORIDE 600 MILLIGRAM(S): 400 TABLET ORAL at 14:22

## 2021-06-10 RX ADMIN — Medication 3 MILLILITER(S): at 15:39

## 2021-06-10 RX ADMIN — Medication 200 GRAM(S): at 23:30

## 2021-06-10 RX ADMIN — FENTANYL CITRATE 25 MICROGRAM(S): 50 INJECTION INTRAVENOUS at 01:05

## 2021-06-10 RX ADMIN — Medication 50 MILLILITER(S): at 10:44

## 2021-06-10 RX ADMIN — SODIUM CHLORIDE 3 MILLILITER(S): 9 INJECTION INTRAMUSCULAR; INTRAVENOUS; SUBCUTANEOUS at 15:39

## 2021-06-10 RX ADMIN — Medication 50 MILLILITER(S): at 09:37

## 2021-06-10 RX ADMIN — AMIODARONE HYDROCHLORIDE 400 MILLIGRAM(S): 400 TABLET ORAL at 21:27

## 2021-06-10 RX ADMIN — HEPARIN SODIUM 5000 UNIT(S): 5000 INJECTION INTRAVENOUS; SUBCUTANEOUS at 04:27

## 2021-06-10 RX ADMIN — FENTANYL CITRATE 25 MICROGRAM(S): 50 INJECTION INTRAVENOUS at 04:59

## 2021-06-10 RX ADMIN — OXYCODONE HYDROCHLORIDE 10 MILLIGRAM(S): 5 TABLET ORAL at 04:26

## 2021-06-10 RX ADMIN — ATORVASTATIN CALCIUM 40 MILLIGRAM(S): 80 TABLET, FILM COATED ORAL at 21:27

## 2021-06-10 RX ADMIN — Medication 4 MILLIGRAM(S): at 13:33

## 2021-06-10 RX ADMIN — SODIUM CHLORIDE 3 MILLILITER(S): 9 INJECTION INTRAMUSCULAR; INTRAVENOUS; SUBCUTANEOUS at 06:02

## 2021-06-10 RX ADMIN — Medication 100 MILLIEQUIVALENT(S): at 23:55

## 2021-06-10 RX ADMIN — AMIODARONE HYDROCHLORIDE 133.33 MILLIGRAM(S): 400 TABLET ORAL at 04:46

## 2021-06-10 RX ADMIN — CARVEDILOL PHOSPHATE 6.25 MILLIGRAM(S): 80 CAPSULE, EXTENDED RELEASE ORAL at 04:27

## 2021-06-10 RX ADMIN — FENTANYL CITRATE 25 MICROGRAM(S): 50 INJECTION INTRAVENOUS at 01:35

## 2021-06-10 RX ADMIN — OXYCODONE HYDROCHLORIDE 5 MILLIGRAM(S): 5 TABLET ORAL at 17:34

## 2021-06-10 RX ADMIN — HEPARIN SODIUM 5000 UNIT(S): 5000 INJECTION INTRAVENOUS; SUBCUTANEOUS at 13:32

## 2021-06-10 RX ADMIN — PANTOPRAZOLE SODIUM 40 MILLIGRAM(S): 20 TABLET, DELAYED RELEASE ORAL at 04:27

## 2021-06-10 RX ADMIN — Medication 81 MILLIGRAM(S): at 12:36

## 2021-06-10 RX ADMIN — AMIODARONE HYDROCHLORIDE 400 MILLIGRAM(S): 400 TABLET ORAL at 13:32

## 2021-06-10 RX ADMIN — Medication 4 MILLIGRAM(S): at 18:14

## 2021-06-10 RX ADMIN — Medication 20 MILLIGRAM(S): at 09:37

## 2021-06-10 RX ADMIN — Medication 40 MILLIEQUIVALENT(S): at 12:35

## 2021-06-10 RX ADMIN — OXYCODONE HYDROCHLORIDE 10 MILLIGRAM(S): 5 TABLET ORAL at 04:56

## 2021-06-10 RX ADMIN — FENTANYL CITRATE 25 MICROGRAM(S): 50 INJECTION INTRAVENOUS at 22:00

## 2021-06-10 RX ADMIN — SODIUM CHLORIDE 3 MILLILITER(S): 9 INJECTION INTRAMUSCULAR; INTRAVENOUS; SUBCUTANEOUS at 18:15

## 2021-06-10 RX ADMIN — CHLORHEXIDINE GLUCONATE 1 APPLICATION(S): 213 SOLUTION TOPICAL at 06:17

## 2021-06-10 RX ADMIN — CARVEDILOL PHOSPHATE 12.5 MILLIGRAM(S): 80 CAPSULE, EXTENDED RELEASE ORAL at 21:27

## 2021-06-10 RX ADMIN — OXYCODONE HYDROCHLORIDE 5 MILLIGRAM(S): 5 TABLET ORAL at 18:14

## 2021-06-10 RX ADMIN — HEPARIN SODIUM 5000 UNIT(S): 5000 INJECTION INTRAVENOUS; SUBCUTANEOUS at 21:27

## 2021-06-10 RX ADMIN — FENTANYL CITRATE 25 MICROGRAM(S): 50 INJECTION INTRAVENOUS at 04:29

## 2021-06-10 NOTE — PROGRESS NOTE ADULT - SUBJECTIVE AND OBJECTIVE BOX
CTICU  CRITICAL  CARE  attending     Hand off received 					   Pertinent clinical, laboratory, radiographic, hemodynamic, echocardiographic, respiratory data, microbiologic data and chart were reviewed and analyzed frequently throughout the course of the day and night  Patient seen and examined with CTS/ SH attending at bedside  Pt is a 61y , Male, HEALTH ISSUES - PROBLEM Dx:    pod 3 avr aortic root an dhemiarch  coreg increased to 12.5mg bid, home dose   vvi 40/10 not used  3 blakes no chnage   R codis R IJ R rad day 3      , FAMILY HISTORY:  FH: hypertension (Mother, Sibling)    PAST MEDICAL & SURGICAL HISTORY:  Hypertension    Hyperlipidemia    NSTEMI (non-ST elevation myocardial infarction)    CKD (chronic kidney disease)    TIA (transient ischemic attack)    CAD (coronary artery disease)    Aortic regurgitation    Thoracic aortic aneurysm    Aortic insufficiency    Mitral regurgitation    Aortic aneurysm    No significant past surgical history      Patient is a 61y old  Male who presents with a chief complaint of aortic regurgitation, mitral regurgitation, thoracic aortic aneurysm, and coronary artery disease (10 Tan 2021 20:22)      14 system review limited by mentation and multiorgan morbidity     Vital signs, hemodynamic and respiratory parameters were reviewed from the bedside nursing flowsheet.  ICU Vital Signs Last 24 Hrs  T(C): 37.2 (10 Tan 2021 21:08), Max: 37.2 (10 Tan 2021 21:08)  T(F): 98.9 (10 Tan 2021 21:08), Max: 98.9 (10 Tan 2021 21:08)  HR: 85 (10 Tan 2021 21:00) (82 - 108)  BP: 154/92 (10 Tan 2021 21:00) (124/68 - 154/92)  BP(mean): 118 (10 Tan 2021 21:00) (88 - 118)  ABP: 153/83 (10 Tan 2021 21:00) (91/50 - 163/90)  ABP(mean): 107 (10 Tan 2021 21:00) (62 - 115)  RR: 18 (10 Tan 2021 21:00) (16 - 20)  SpO2: 96% (10 Tan 2021 21:00) (93% - 100%)    Adult Advanced Hemodynamics Last 24 Hrs  CVP(mm Hg): --  CVP(cm H2O): --  CO: --  CI: --  PA: --  PA(mean): --  PCWP: --  SVR: --  SVRI: --  PVR: --  PVRI: --, ABG - ( 10 Tan 2021 01:15 )  pH, Arterial: 7.45  pH, Blood: x     /  pCO2: 38    /  pO2: 84    / HCO3: 26    / Base Excess: 2.4   /  SaO2: 99.0                Intake and output was reviewed and the fluid balance was calculated  Daily     Daily   I&O's Summary    09 Jun 2021 07:01  -  10 Tan 2021 07:00  --------------------------------------------------------  IN: 1360 mL / OUT: 1875 mL / NET: -515 mL    10 Tan 2021 07:01  -  10 Tan 2021 22:24  --------------------------------------------------------  IN: 840 mL / OUT: 875 mL / NET: -35 mL        All lines and drain sites were assessed  Glycemic trend was reviewedCAPILLARY BLOOD GLUCOSE      POCT Blood Glucose.: 144 mg/dL (10 Tan 2021 05:03)    No acute change in focality  Auscultation of the chest reveals equal bs  Abdomen is soft  Extremities are warm and well perfused  Wounds appear clean and unremarkable  Antibiotics are periop    labs  CBC Full  -  ( 10 Tan 2021 09:31 )  WBC Count : 16.02 K/uL  RBC Count : 2.67 M/uL  Hemoglobin : 7.7 g/dL  Hematocrit : 23.2 %  Platelet Count - Automated : 133 K/uL  Mean Cell Volume : 86.9 fl  Mean Cell Hemoglobin : 28.8 pg  Mean Cell Hemoglobin Concentration : 33.2 gm/dL  Auto Neutrophil # : x  Auto Lymphocyte # : x  Auto Monocyte # : x  Auto Eosinophil # : x  Auto Basophil # : x  Auto Neutrophil % : x  Auto Lymphocyte % : x  Auto Monocyte % : x  Auto Eosinophil % : x  Auto Basophil % : x    06-10    135  |  100  |  31<H>  ----------------------------<  167<H>  4.1   |  26  |  1.28    Ca    8.1<L>      10 Tan 2021 15:01  Phos  2.6     06-10  Mg     2.2     06-10    TPro  6.3  /  Alb  3.2<L>  /  TBili  0.4  /  DBili  x   /  AST  30  /  ALT  9<L>  /  AlkPhos  37<L>  06-09    PT/INR - ( 10 Tan 2021 01:15 )   PT: 12.1 sec;   INR: 1.01          PTT - ( 10 Tan 2021 01:15 )  PTT:26.5 sec  The current medications were reviewed   MEDICATIONS  (STANDING):  aMIOdarone    Tablet   Oral   aMIOdarone    Tablet 400 milliGRAM(s) Oral every 8 hours  aspirin enteric coated 81 milliGRAM(s) Oral daily  atorvastatin 40 milliGRAM(s) Oral at bedtime  carvedilol 12.5 milliGRAM(s) Oral every 12 hours  chlorhexidine 2% Cloths 1 Application(s) Topical <User Schedule>  heparin   Injectable 5000 Unit(s) SubCutaneous every 8 hours  ipratropium  for Nebulization. 500 MICROGram(s) Nebulizer once  pantoprazole    Tablet 40 milliGRAM(s) Oral before breakfast  polyethylene glycol 3350 17 Gram(s) Oral daily  sodium chloride 0.9%. 1000 milliLiter(s) (10 mL/Hr) IV Continuous <Continuous>  sodium chloride 3%  Inhalation 3 milliLiter(s) Inhalation every 6 hours  testosterone patch 4 mG/24 Hr(s) 4 milliGRAM(s) Transdermal daily    MEDICATIONS  (PRN):  acetaminophen   Tablet .. 650 milliGRAM(s) Oral every 6 hours PRN Mild Pain (1 - 3)  albuterol/ipratropium for Nebulization 3 milliLiter(s) Nebulizer every 6 hours PRN Respirations Above___  oxyCODONE    IR 5 milliGRAM(s) Oral every 4 hours PRN Moderate Pain (4 - 6)  oxyCODONE    IR 10 milliGRAM(s) Oral every 6 hours PRN Severe Pain (7 - 10)       PROBLEM LIST/ ASSESSMENT:  HEALTH ISSUES - PROBLEM Dx:      ,   Patient is a 61y old  Male who presents with a chief complaint of aortic regurgitation, mitral regurgitation, thoracic aortic aneurysm, and coronary artery disease (10 Tan 2021 20:22)     s/p cardiac surgery                My plan includes :  close hemodynamic, ventilatory and drain monitoring and management per post op routine    Monitor for arrhythmias and monitor parameters for organ perfusion  beta blockade not administered due to hemodynamic instability and bradycardia  monitor neurologic status  Head of the bed should remain elevated to 45 deg .   chest PT and IS will be encouraged  monitor adequacy of oxygenation and ventilation and attempt to wean oxygen  antibiotic regimen will be tailored to the clinical, laboratory and microbiologic data  Nutritional goals will be met using po eventually , ensure adequate caloric intake and montior the same  Stress ulcer and VTE prophylaxis will be achieved    Glycemic control is satisfactory  Electrolytes have been repleted as necessary and wound care has been carried out. Pain control has been achieved.   agressive physical therapy and early mobility and ambulation goals will be met   The family was updated about the course and plan  CRITICAL CARE TIME personally provided by me  in evaluation and management, reassessments, review and interpretation of labs and x-rays, ventilator and hemodynamic management, formulating a plan and coordinating care: ___30____ MIN.  Time does not include procedural time. Time spent was non routine post-operarive caRE and included multiple and repeated evaluations at the bedside  CTICU ATTENDING     					    Noe Hooker MD

## 2021-06-10 NOTE — PROGRESS NOTE ADULT - SUBJECTIVE AND OBJECTIVE BOX
CTICU  CRITICAL  CARE  attending     Hand off received 					   Pertinent clinical, laboratory, radiographic, hemodynamic, echocardiographic, respiratory data, microbiologic data and chart were reviewed and analyzed frequently throughout the course of the day and night  Patient seen and examined with CTS/ SH attending at bedside  Pt is a 61y , Male, HEALTH ISSUES - PROBLEM Dx:      , FAMILY HISTORY:  FH: hypertension (Mother, Sibling)    PAST MEDICAL & SURGICAL HISTORY:  Hypertension    Hyperlipidemia    NSTEMI (non-ST elevation myocardial infarction)    CKD (chronic kidney disease)    TIA (transient ischemic attack)    CAD (coronary artery disease)    Aortic regurgitation    Thoracic aortic aneurysm    Aortic insufficiency    Mitral regurgitation    Aortic aneurysm    No significant past surgical history      Patient is a 61y old  Male who presents with a chief complaint of aortic regurgitation, mitral regurgitation, thoracic aortic aneurysm, and coronary artery disease (09 Jun 2021 23:27)      14 system review limited by mentation and multiorgan morbidity     Vital signs, hemodynamic and respiratory parameters were reviewed from the bedside nursing flowsheet.  ICU Vital Signs Last 24 Hrs  T(C): 36.3 (10 Tan 2021 17:15), Max: 37.3 (09 Jun 2021 21:01)  T(F): 97.4 (10 Tan 2021 17:15), Max: 99.1 (09 Jun 2021 21:01)  HR: 86 (10 Tan 2021 20:00) (82 - 108)  BP: 124/68 (10 Tan 2021 11:50) (124/68 - 130/65)  BP(mean): 88 (10 Tan 2021 11:50) (88 - 90)  ABP: 163/90 (10 Tan 2021 20:00) (91/50 - 163/90)  ABP(mean): 115 (10 Tan 2021 20:00) (62 - 115)  RR: 20 (10 Tan 2021 20:00) (15 - 20)  SpO2: 98% (10 Tan 2021 20:00) (93% - 100%)    Adult Advanced Hemodynamics Last 24 Hrs  CVP(mm Hg): --  CVP(cm H2O): --  CO: --  CI: --  PA: --  PA(mean): --  PCWP: --  SVR: --  SVRI: --  PVR: --  PVRI: --, ABG - ( 10 Tan 2021 01:15 )  pH, Arterial: 7.45  pH, Blood: x     /  pCO2: 38    /  pO2: 84    / HCO3: 26    / Base Excess: 2.4   /  SaO2: 99.0                Intake and output was reviewed and the fluid balance was calculated  Daily     Daily   I&O's Summary    09 Jun 2021 07:01  -  10 Tan 2021 07:00  --------------------------------------------------------  IN: 1360 mL / OUT: 1875 mL / NET: -515 mL    10 Tan 2021 07:01  -  10 Tan 2021 20:22  --------------------------------------------------------  IN: 830 mL / OUT: 800 mL / NET: 30 mL        All lines and drain sites were assessed  [facility]      POCT Blood Glucose.: 144 mg/dL (10 Tan 2021 05:03)    No acute change in focality  Auscultation of the chest reveals equal bs  Abdomen is soft  Extremities are warm and well perfused  Wounds appear clean and unremarkable  Antibiotics are periop    labs  CBC Full  -  ( 10 Tan 2021 09:31 )  WBC Count : 16.02 K/uL  RBC Count : 2.67 M/uL  Hemoglobin : 7.7 g/dL  Hematocrit : 23.2 %  Platelet Count - Automated : 133 K/uL  Mean Cell Volume : 86.9 fl  Mean Cell Hemoglobin : 28.8 pg  Mean Cell Hemoglobin Concentration : 33.2 gm/dL  Auto Neutrophil # : x  Auto Lymphocyte # : x  Auto Monocyte # : x  Auto Eosinophil # : x  Auto Basophil # : x  Auto Neutrophil % : x  Auto Lymphocyte % : x  Auto Monocyte % : x  Auto Eosinophil % : x  Auto Basophil % : x    06-10    135  |  100  |  31<H>  ----------------------------<  167<H>  4.1   |  26  |  1.28    Ca    8.1<L>      10 Tan 2021 15:01  Phos  2.6     06-10  Mg     2.2     06-10    TPro  6.3  /  Alb  3.2<L>  /  TBili  0.4  /  DBili  x   /  AST  30  /  ALT  9<L>  /  AlkPhos  37<L>  06-09    PT/INR - ( 10 Tan 2021 01:15 )   PT: 12.1 sec;   INR: 1.01          PTT - ( 10 Tan 2021 01:15 )  PTT:26.5 sec  The current medications were reviewed   MEDICATIONS  (STANDING):  aMIOdarone    Tablet   Oral   aMIOdarone    Tablet 400 milliGRAM(s) Oral every 8 hours  aspirin enteric coated 81 milliGRAM(s) Oral daily  atorvastatin 40 milliGRAM(s) Oral at bedtime  carvedilol 3.125 milliGRAM(s) Oral every 12 hours  chlorhexidine 2% Cloths 1 Application(s) Topical <User Schedule>  heparin   Injectable 5000 Unit(s) SubCutaneous every 8 hours  pantoprazole    Tablet 40 milliGRAM(s) Oral before breakfast  polyethylene glycol 3350 17 Gram(s) Oral daily  sodium chloride 0.9%. 1000 milliLiter(s) (10 mL/Hr) IV Continuous <Continuous>  sodium chloride 3%  Inhalation 3 milliLiter(s) Inhalation every 6 hours  testosterone patch 4 mG/24 Hr(s) 4 milliGRAM(s) Transdermal daily    MEDICATIONS  (PRN):  acetaminophen   Tablet .. 650 milliGRAM(s) Oral every 6 hours PRN Mild Pain (1 - 3)  albuterol/ipratropium for Nebulization 3 milliLiter(s) Nebulizer every 6 hours PRN Respirations Above___  fentaNYL    Injectable 25 MICROGram(s) IV Push every 3 hours PRN Severe Pain (7 - 10)  oxyCODONE    IR 5 milliGRAM(s) Oral every 4 hours PRN Moderate Pain (4 - 6)  oxyCODONE    IR 10 milliGRAM(s) Oral every 6 hours PRN Severe Pain (7 - 10)       PROBLEM LIST/ ASSESSMENT:  HEALTH ISSUES - PROBLEM Dx:      ,   Patient is a 61y old  Male who presents with a chief complaint of aortic regurgitation, mitral regurgitation, thoracic aortic aneurysm, and coronary artery disease (09 Jun 2021 23:27)     s/p cardiac surgery                My plan includes :  close hemodynamic, ventilatory and drain monitoring and management per post op routine    Monitor for arrhythmias and monitor parameters for organ perfusion  beta blockade not administered due to hemodynamic instability and bradycardia  monitor neurologic status  Head of the bed should remain elevated to 45 deg .   chest PT and IS will be encouraged  monitor adequacy of oxygenation and ventilation and attempt to wean oxygen  antibiotic regimen will be tailored to the clinical, laboratory and microbiologic data  Nutritional goals will be met using po eventually , ensure adequate caloric intake and montior the same  Stress ulcer and VTE prophylaxis will be achieved    Glycemic control is satisfactory  Electrolytes have been repleted as necessary and wound care has been carried out. Pain control has been achieved.   agressive physical therapy and early mobility and ambulation goals will be met   The family was updated about the course and plan  CRITICAL CARE TIME personally provided by me  in evaluation and management, reassessments, review and interpretation of labs and x-rays, ventilator and hemodynamic management, formulating a plan and coordinating care: ___90____ MIN.  Time does not include procedural time. Time spent was non routine post-operarive caRE and included multiple and repeated evaluations at the bedside  CTICU ATTENDING     					    Jack Garcia MD

## 2021-06-11 LAB
ANION GAP SERPL CALC-SCNC: 10 MMOL/L — SIGNIFICANT CHANGE UP (ref 5–17)
ANION GAP SERPL CALC-SCNC: 10 MMOL/L — SIGNIFICANT CHANGE UP (ref 5–17)
APTT BLD: 25.7 SEC — LOW (ref 27.5–35.5)
APTT BLD: 27.3 SEC — LOW (ref 27.5–35.5)
BLD GP AB SCN SERPL QL: NEGATIVE — SIGNIFICANT CHANGE UP
BUN SERPL-MCNC: 32 MG/DL — HIGH (ref 7–23)
BUN SERPL-MCNC: 36 MG/DL — HIGH (ref 7–23)
CALCIUM SERPL-MCNC: 8.4 MG/DL — SIGNIFICANT CHANGE UP (ref 8.4–10.5)
CALCIUM SERPL-MCNC: 8.6 MG/DL — SIGNIFICANT CHANGE UP (ref 8.4–10.5)
CHLORIDE SERPL-SCNC: 102 MMOL/L — SIGNIFICANT CHANGE UP (ref 96–108)
CHLORIDE SERPL-SCNC: 102 MMOL/L — SIGNIFICANT CHANGE UP (ref 96–108)
CO2 SERPL-SCNC: 22 MMOL/L — SIGNIFICANT CHANGE UP (ref 22–31)
CO2 SERPL-SCNC: 23 MMOL/L — SIGNIFICANT CHANGE UP (ref 22–31)
CREAT SERPL-MCNC: 1.14 MG/DL — SIGNIFICANT CHANGE UP (ref 0.5–1.3)
CREAT SERPL-MCNC: 1.22 MG/DL — SIGNIFICANT CHANGE UP (ref 0.5–1.3)
GAS PNL BLDA: SIGNIFICANT CHANGE UP
GAS PNL BLDA: SIGNIFICANT CHANGE UP
GLUCOSE SERPL-MCNC: 111 MG/DL — HIGH (ref 70–99)
GLUCOSE SERPL-MCNC: 162 MG/DL — HIGH (ref 70–99)
HCT VFR BLD CALC: 26.7 % — LOW (ref 39–50)
HCT VFR BLD CALC: 28.6 % — LOW (ref 39–50)
HGB BLD-MCNC: 8.9 G/DL — LOW (ref 13–17)
HGB BLD-MCNC: 9.6 G/DL — LOW (ref 13–17)
INR BLD: 1 — SIGNIFICANT CHANGE UP (ref 0.88–1.16)
INR BLD: 1.06 — SIGNIFICANT CHANGE UP (ref 0.88–1.16)
MAGNESIUM SERPL-MCNC: 2.1 MG/DL — SIGNIFICANT CHANGE UP (ref 1.6–2.6)
MAGNESIUM SERPL-MCNC: 2.1 MG/DL — SIGNIFICANT CHANGE UP (ref 1.6–2.6)
MCHC RBC-ENTMCNC: 28.9 PG — SIGNIFICANT CHANGE UP (ref 27–34)
MCHC RBC-ENTMCNC: 29.4 PG — SIGNIFICANT CHANGE UP (ref 27–34)
MCHC RBC-ENTMCNC: 33.3 GM/DL — SIGNIFICANT CHANGE UP (ref 32–36)
MCHC RBC-ENTMCNC: 33.6 GM/DL — SIGNIFICANT CHANGE UP (ref 32–36)
MCV RBC AUTO: 86.7 FL — SIGNIFICANT CHANGE UP (ref 80–100)
MCV RBC AUTO: 87.5 FL — SIGNIFICANT CHANGE UP (ref 80–100)
NRBC # BLD: 0 /100 WBCS — SIGNIFICANT CHANGE UP (ref 0–0)
NRBC # BLD: 0 /100 WBCS — SIGNIFICANT CHANGE UP (ref 0–0)
PHOSPHATE SERPL-MCNC: 2.7 MG/DL — SIGNIFICANT CHANGE UP (ref 2.5–4.5)
PHOSPHATE SERPL-MCNC: 2.9 MG/DL — SIGNIFICANT CHANGE UP (ref 2.5–4.5)
PLATELET # BLD AUTO: 142 K/UL — LOW (ref 150–400)
PLATELET # BLD AUTO: 149 K/UL — LOW (ref 150–400)
POTASSIUM SERPL-MCNC: 4.1 MMOL/L — SIGNIFICANT CHANGE UP (ref 3.5–5.3)
POTASSIUM SERPL-MCNC: 4.2 MMOL/L — SIGNIFICANT CHANGE UP (ref 3.5–5.3)
POTASSIUM SERPL-SCNC: 4.1 MMOL/L — SIGNIFICANT CHANGE UP (ref 3.5–5.3)
POTASSIUM SERPL-SCNC: 4.2 MMOL/L — SIGNIFICANT CHANGE UP (ref 3.5–5.3)
PROTHROM AB SERPL-ACNC: 12 SEC — SIGNIFICANT CHANGE UP (ref 10.6–13.6)
PROTHROM AB SERPL-ACNC: 12.7 SEC — SIGNIFICANT CHANGE UP (ref 10.6–13.6)
RBC # BLD: 3.08 M/UL — LOW (ref 4.2–5.8)
RBC # BLD: 3.27 M/UL — LOW (ref 4.2–5.8)
RBC # FLD: 15.5 % — HIGH (ref 10.3–14.5)
RBC # FLD: 15.8 % — HIGH (ref 10.3–14.5)
RH IG SCN BLD-IMP: POSITIVE — SIGNIFICANT CHANGE UP
SODIUM SERPL-SCNC: 134 MMOL/L — LOW (ref 135–145)
SODIUM SERPL-SCNC: 135 MMOL/L — SIGNIFICANT CHANGE UP (ref 135–145)
WBC # BLD: 13.47 K/UL — HIGH (ref 3.8–10.5)
WBC # BLD: 14.24 K/UL — HIGH (ref 3.8–10.5)
WBC # FLD AUTO: 13.47 K/UL — HIGH (ref 3.8–10.5)
WBC # FLD AUTO: 14.24 K/UL — HIGH (ref 3.8–10.5)

## 2021-06-11 PROCEDURE — 99292 CRITICAL CARE ADDL 30 MIN: CPT

## 2021-06-11 PROCEDURE — 71045 X-RAY EXAM CHEST 1 VIEW: CPT | Mod: 26

## 2021-06-11 PROCEDURE — 99291 CRITICAL CARE FIRST HOUR: CPT

## 2021-06-11 RX ORDER — FUROSEMIDE 40 MG
20 TABLET ORAL ONCE
Refills: 0 | Status: COMPLETED | OUTPATIENT
Start: 2021-06-11 | End: 2021-06-11

## 2021-06-11 RX ORDER — OXYCODONE HYDROCHLORIDE 5 MG/1
10 TABLET ORAL ONCE
Refills: 0 | Status: DISCONTINUED | OUTPATIENT
Start: 2021-06-11 | End: 2021-06-11

## 2021-06-11 RX ORDER — ACETAMINOPHEN 500 MG
1000 TABLET ORAL ONCE
Refills: 0 | Status: COMPLETED | OUTPATIENT
Start: 2021-06-11 | End: 2021-06-11

## 2021-06-11 RX ADMIN — OXYCODONE HYDROCHLORIDE 5 MILLIGRAM(S): 5 TABLET ORAL at 05:50

## 2021-06-11 RX ADMIN — Medication 20 MILLIGRAM(S): at 14:33

## 2021-06-11 RX ADMIN — OXYCODONE HYDROCHLORIDE 10 MILLIGRAM(S): 5 TABLET ORAL at 23:37

## 2021-06-11 RX ADMIN — CARVEDILOL PHOSPHATE 12.5 MILLIGRAM(S): 80 CAPSULE, EXTENDED RELEASE ORAL at 05:36

## 2021-06-11 RX ADMIN — CARVEDILOL PHOSPHATE 12.5 MILLIGRAM(S): 80 CAPSULE, EXTENDED RELEASE ORAL at 17:15

## 2021-06-11 RX ADMIN — SODIUM CHLORIDE 3 MILLILITER(S): 9 INJECTION INTRAMUSCULAR; INTRAVENOUS; SUBCUTANEOUS at 05:34

## 2021-06-11 RX ADMIN — Medication 500 MICROGRAM(S): at 00:21

## 2021-06-11 RX ADMIN — SODIUM CHLORIDE 3 MILLILITER(S): 9 INJECTION INTRAMUSCULAR; INTRAVENOUS; SUBCUTANEOUS at 14:33

## 2021-06-11 RX ADMIN — Medication 4 MILLIGRAM(S): at 12:34

## 2021-06-11 RX ADMIN — AMIODARONE HYDROCHLORIDE 400 MILLIGRAM(S): 400 TABLET ORAL at 14:33

## 2021-06-11 RX ADMIN — OXYCODONE HYDROCHLORIDE 10 MILLIGRAM(S): 5 TABLET ORAL at 14:33

## 2021-06-11 RX ADMIN — HEPARIN SODIUM 5000 UNIT(S): 5000 INJECTION INTRAVENOUS; SUBCUTANEOUS at 05:36

## 2021-06-11 RX ADMIN — OXYCODONE HYDROCHLORIDE 10 MILLIGRAM(S): 5 TABLET ORAL at 15:28

## 2021-06-11 RX ADMIN — OXYCODONE HYDROCHLORIDE 10 MILLIGRAM(S): 5 TABLET ORAL at 23:00

## 2021-06-11 RX ADMIN — OXYCODONE HYDROCHLORIDE 10 MILLIGRAM(S): 5 TABLET ORAL at 02:00

## 2021-06-11 RX ADMIN — Medication 4 MILLIGRAM(S): at 07:10

## 2021-06-11 RX ADMIN — PANTOPRAZOLE SODIUM 40 MILLIGRAM(S): 20 TABLET, DELAYED RELEASE ORAL at 07:10

## 2021-06-11 RX ADMIN — HEPARIN SODIUM 5000 UNIT(S): 5000 INJECTION INTRAVENOUS; SUBCUTANEOUS at 21:44

## 2021-06-11 RX ADMIN — Medication 4 MILLIGRAM(S): at 17:13

## 2021-06-11 RX ADMIN — SODIUM CHLORIDE 3 MILLILITER(S): 9 INJECTION INTRAMUSCULAR; INTRAVENOUS; SUBCUTANEOUS at 18:01

## 2021-06-11 RX ADMIN — Medication 100 MILLIEQUIVALENT(S): at 01:05

## 2021-06-11 RX ADMIN — AMIODARONE HYDROCHLORIDE 400 MILLIGRAM(S): 400 TABLET ORAL at 05:36

## 2021-06-11 RX ADMIN — OXYCODONE HYDROCHLORIDE 10 MILLIGRAM(S): 5 TABLET ORAL at 00:45

## 2021-06-11 RX ADMIN — Medication 400 MILLIGRAM(S): at 14:33

## 2021-06-11 RX ADMIN — SODIUM CHLORIDE 10 MILLILITER(S): 9 INJECTION INTRAMUSCULAR; INTRAVENOUS; SUBCUTANEOUS at 19:27

## 2021-06-11 RX ADMIN — HEPARIN SODIUM 5000 UNIT(S): 5000 INJECTION INTRAVENOUS; SUBCUTANEOUS at 14:33

## 2021-06-11 RX ADMIN — OXYCODONE HYDROCHLORIDE 5 MILLIGRAM(S): 5 TABLET ORAL at 06:15

## 2021-06-11 RX ADMIN — Medication 20 MILLIGRAM(S): at 04:10

## 2021-06-11 RX ADMIN — ATORVASTATIN CALCIUM 40 MILLIGRAM(S): 80 TABLET, FILM COATED ORAL at 21:44

## 2021-06-11 RX ADMIN — Medication 81 MILLIGRAM(S): at 12:19

## 2021-06-11 RX ADMIN — AMIODARONE HYDROCHLORIDE 400 MILLIGRAM(S): 400 TABLET ORAL at 21:44

## 2021-06-11 RX ADMIN — POLYETHYLENE GLYCOL 3350 17 GRAM(S): 17 POWDER, FOR SOLUTION ORAL at 12:20

## 2021-06-11 RX ADMIN — Medication 4 MILLIGRAM(S): at 12:42

## 2021-06-11 RX ADMIN — CHLORHEXIDINE GLUCONATE 1 APPLICATION(S): 213 SOLUTION TOPICAL at 07:10

## 2021-06-11 RX ADMIN — SODIUM CHLORIDE 3 MILLILITER(S): 9 INJECTION INTRAMUSCULAR; INTRAVENOUS; SUBCUTANEOUS at 23:07

## 2021-06-11 RX ADMIN — Medication 1000 MILLIGRAM(S): at 15:28

## 2021-06-11 RX ADMIN — SODIUM CHLORIDE 3 MILLILITER(S): 9 INJECTION INTRAMUSCULAR; INTRAVENOUS; SUBCUTANEOUS at 00:02

## 2021-06-11 NOTE — PROGRESS NOTE ADULT - SUBJECTIVE AND OBJECTIVE BOX
CTICU  CRITICAL  CARE  attending     Hand off received 					   Pertinent clinical, laboratory, radiographic, hemodynamic, echocardiographic, respiratory data, microbiologic data and chart were reviewed and analyzed frequently throughout the course of the day and night      61 year old male with  hypertension, hyperlipidemia, CKD stage 3, TIA (2007), CAD s/p NSTEMI (Jan 2019), drug and alcohol misuse (cocaine/marijuana 30 yr hx).  He was recently  hospitalized for pneumonia and heart failure exacerbation, presents for evaluation and management of his valvular disease, dilated aorta, and coronary artery disease.   The Patient was recently hospitalized at [facility] for pneumonia and worsening dyspnea on exertion. Since discharge, he states that he is feeling better. He admits to worsening fatigue and lightheaded over the past couple of months with activities. He is able to ambulate 5 blocks prior to onset of symptoms.  Cardiac workup which revealed severe AI.   Cardiac Cath 4/15/21:   Left main: angiographically normal   LAD: significant ectasia noted   Distal LAD: moderate diffuse disease   Circumflex: Significant ectasia noted   Distal Circ: severe diffuse disease   RCA: Significant ectasia noted.     MICAELA 5/3/21:    1. Mildly reduced left ventricular systolic function.    2. Normal right ventricular size.    3. Gmwcao-pz-ixitlbylir reduced right ventricular systolic function.    4. No LA/RA/MARIS/RAA thrombus seen.    5. Color flow Doppler reveals evidence of a patent foramen ovale with primarily left to right shunting by   color doppler.    6. Mild-to-moderate mitral regurgitation.    7. Fibrocalcific tricuspid aortic valve without significant stenosis. The aortic valve leaflets do not coapt.   There is severe aortic regurgitation. The vena contracta width is 0.60 cm (severe >0.6 cm). There is diastolic flow reversal in the descending aorta supporting severe aortic regurgitation diagnosis.    8. The aortic root is moderately dilated. The aortic root measures 4.60 cm at level of the sinuses of  Valsalva (normal 3.1-3.7 cm for men, 2.7-3.3 cm for women).    9. No pericardial effusion.     CTA chest 5/12/21: aortic root 4.7cm. ascending aorta 4.5cm. incidental finding of distal left atrial appendage incomplete enhancement vs early thrombus formation.     The patient was evaluated by CT surgery and is deemed a surgical candidate for aortic valve replacement, mitral valve repair/placement, possible coronary artery bypass grafting, aortic root and ascending aorta replacement.        FAMILY HISTORY:  FH: hypertension (Mother, Sibling)    PAST MEDICAL & SURGICAL HISTORY:  Hypertension  Hyperlipidemia  NSTEMI (non-ST elevation myocardial infarction)  CKD (chronic kidney disease)  TIA (transient ischemic attack)  CAD (coronary artery disease)  Aortic regurgitation  Thoracic aortic aneurysm  Aortic insufficiency  Mitral regurgitation  Aortic aneurysm  No significant past surgical history          14 system review was unremarkable      Vital signs, hemodynamic and respiratory parameters were reviewed from the bedside nursing flow sheet.  ICU Vital Signs Last 24 Hrs  T(C): 36.9 (11 Jun 2021 22:05), Max: 36.9 (11 Jun 2021 22:05)  T(F): 98.4 (11 Jun 2021 22:05), Max: 98.4 (11 Jun 2021 22:05)  HR: 80 (12 Jun 2021 00:00) (69 - 92)  BP: 113/55 (11 Jun 2021 21:00) (10/57 - 158/95)  BP(mean): 77 (11 Jun 2021 21:00) (72 - 106)  ABP: 140/62 (12 Jun 2021 00:00) (91/49 - 144/72)  ABP(mean): 85 (12 Jun 2021 00:00) (60 - 98)  RR: 20 (12 Jun 2021 00:00) (13 - 26)  SpO2: 98% (12 Jun 2021 00:00) (92% - 99%)    Adult Advanced Hemodynamics Last 24 Hrs  CVP(mm Hg): --  CVP(cm H2O): --  CO: --  CI: --  PA: --  PA(mean): --  PCWP: --  SVR: --  SVRI: --  PVR: --  PVRI: --, ABG - ( 11 Jun 2021 14:21 )  pH, Arterial: 7.42  pH, Blood: x     /  pCO2: 36    /  pO2: 81    / HCO3: 23    / Base Excess: -0.7  /  SaO2: 98.0                Intake and output was reviewed and the fluid balance was calculated  Daily     Daily   I&O's Summary    10 Tan 2021 07:01  -  11 Jun 2021 07:00  --------------------------------------------------------  IN: 1140 mL / OUT: 1835 mL / NET: -695 mL    11 Jun 2021 07:01  -  12 Jun 2021 00:49  --------------------------------------------------------  IN: 1480 mL / OUT: 1260 mL / NET: 220 mL        All lines and drain sites were assessed      Neuro: No change in the mental status from the baseline. Follows commands. Moves all 4 extremities.  Neck: No JVD.  CVS: S1, S2, No S3.  Lungs: Good air entry bilaterally.  Abd: Soft. No tenderness. + Bowel sounds.  Vascular: + DP/PT.  Extremities: No edema.  Lymphatic: Normal.  Skin: No abnormalities.      labs  CBC Full  -  ( 11 Jun 2021 14:25 )  WBC Count : 13.47 K/uL  RBC Count : 3.27 M/uL  Hemoglobin : 9.6 g/dL  Hematocrit : 28.6 %  Platelet Count - Automated : 149 K/uL  Mean Cell Volume : 87.5 fl  Mean Cell Hemoglobin : 29.4 pg  Mean Cell Hemoglobin Concentration : 33.6 gm/dL  Auto Neutrophil # : x  Auto Lymphocyte # : x  Auto Monocyte # : x  Auto Eosinophil # : x  Auto Basophil # : x  Auto Neutrophil % : x  Auto Lymphocyte % : x  Auto Monocyte % : x  Auto Eosinophil % : x  Auto Basophil % : x    06-11    134<L>  |  102  |  36<H>  ----------------------------<  162<H>  4.1   |  22  |  1.22    Ca    8.4      11 Jun 2021 14:25  Phos  2.9     06-11  Mg     2.1     06-11      PT/INR - ( 11 Jun 2021 14:25 )   PT: 12.7 sec;   INR: 1.06          PTT - ( 11 Jun 2021 14:25 )  PTT:27.3 sec  The current medications were reviewed   MEDICATIONS  (STANDING):  aMIOdarone    Tablet   Oral   aMIOdarone    Tablet 400 milliGRAM(s) Oral every 8 hours  aspirin enteric coated 81 milliGRAM(s) Oral daily  atorvastatin 40 milliGRAM(s) Oral at bedtime  carvedilol 12.5 milliGRAM(s) Oral every 12 hours  chlorhexidine 2% Cloths 1 Application(s) Topical <User Schedule>  heparin   Injectable 5000 Unit(s) SubCutaneous every 8 hours  pantoprazole    Tablet 40 milliGRAM(s) Oral before breakfast  polyethylene glycol 3350 17 Gram(s) Oral daily  sodium chloride 0.9%. 1000 milliLiter(s) (10 mL/Hr) IV Continuous <Continuous>  sodium chloride 3%  Inhalation 3 milliLiter(s) Inhalation every 6 hours  testosterone patch 4 mG/24 Hr(s) 4 milliGRAM(s) Transdermal daily    MEDICATIONS  (PRN):  acetaminophen   Tablet .. 650 milliGRAM(s) Oral every 6 hours PRN Mild Pain (1 - 3)  albuterol/ipratropium for Nebulization 3 milliLiter(s) Nebulizer every 6 hours PRN Respirations Above___  oxyCODONE    IR 5 milliGRAM(s) Oral every 4 hours PRN Moderate Pain (4 - 6)  oxyCODONE    IR 10 milliGRAM(s) Oral every 6 hours PRN Severe Pain (7 - 10)      61y old  Male who presents with aortic regurgitation, mitral regurgitation, thoracic aortic aneurysm, and coronary artery disease.  S/P aortic root replacement.  S/P replacement of ascending aorta and hemiarch.  S/P PFO closure.  Hemodynamically stable.  Good oxygenation.  Fair urine out put.        My plan includes :  Statin Rx.  Amiodarone and coreg.  Close hemodynamic, ventilatory and drain monitoring and management  Monitor for arrhythmias and monitor parameters for organ perfusion  Monitor neurologic status  Monitor renal function.  Head of the bed should remain elevated to 45 deg .   Chest PT and IS will be encouraged  Monitor adequacy of oxygenation and ventilation and attempt to wean oxygen  Nutritional goals will be met using po eventually , ensure adequate caloric intake and monitor the same  Stress ulcer and VTE prophylaxis will be achieved    OPTIMIZE Glycemic control.  Electrolytes have been repleted as necessary and wound care has been carried out. Pain control has been achieved.   Aggressive physical therapy and early mobility and ambulation goals will be met   The family was updated about the course and plan  CRITICAL CARE TIME SPENT in evaluation and management, reassessments, review and interpretation of labs and x-rays, ventilator and hemodynamic management, formulating a plan and coordinating care: ___30____ MIN.  Time does not include procedural time.  CTICU ATTENDING     					    Mynor Cottrell MD

## 2021-06-11 NOTE — DIETITIAN INITIAL EVALUATION ADULT. - OTHER CALCULATIONS
Ideal body weight used for calculations as pt >120% of IBW. (123% IBW). Nutrient needs based on [facility] standards of care for maintenance in adults adjusted for post-op needs, age, fluid per team

## 2021-06-11 NOTE — PROGRESS NOTE ADULT - SUBJECTIVE AND OBJECTIVE BOX
CTICU  CRITICAL  CARE  attending     Hand off received 					   Pertinent clinical, laboratory, radiographic, hemodynamic, echocardiographic, respiratory data, microbiologic data and chart were reviewed and analyzed frequently throughout the course of the day and night  Patient seen and examined with CTS/ SH attending at bedside    Pt is a 61y , Male, post op day # 4 s/p AVR; replacement of aortic root; ascending aorta and maite arch replacement; PFO closure; EF 40%;    post op course c/b    RTOR for bleeding    post op    remains iextubated  Epinephrine weaned off  diuresed  s/p bedside bronchoscopy to clear secretions ( pt with active smoking Hx)    today:    atrial fibrillation; rate controlled   s/p amiodarone 150 mg bolus; ongoing amiodarone loading  diuresed    HPI:    61 year old male, ex-smoker (quit 2 months ago), with a past medical history of hypertension, hyperlipidemia, CKD stage 3, TIA (2007), CAD s/p NSTEMI (Jan 2019), drug and alcohol misuse (cocaine/marijuana 30 yr hx), recent hospitalization for pneumonia and heart failure exacerbation, presents for evaluation and management of his valvular disease, dilated aorta, and coronary artery disease. NYHA Class II-III. He is referred by Dr. Gerry Rushing.           , FAMILY HISTORY:  FH: hypertension (Mother, Sibling)    PAST MEDICAL & SURGICAL HISTORY:  Hypertension    Hyperlipidemia    NSTEMI (non-ST elevation myocardial infarction)    CKD (chronic kidney disease)    TIA (transient ischemic attack)    CAD (coronary artery disease)    Aortic regurgitation    Thoracic aortic aneurysm    Aortic insufficiency    Mitral regurgitation    Aortic aneurysm    No significant past surgical history      Patient is a 61y old  Male who presents with a chief complaint of aortic regurgitation, mitral regurgitation, thoracic aortic aneurysm, and coronary artery disease (11 Jun 2021 14:16)      14 system review was unremarkable  acute changes include acute respiratory failure  Vital signs, hemodynamic and respiratory parameters were reviewed from the bedside nursing flowsheet.  ICU Vital Signs Last 24 Hrs  T(C): 36.4 (11 Jun 2021 17:22), Max: 37.2 (10 Tan 2021 21:08)  T(F): 97.5 (11 Jun 2021 17:22), Max: 98.9 (10 Tan 2021 21:08)  HR: 83 (11 Jun 2021 17:00) (69 - 92)  BP: 158/95 (11 Jun 2021 17:00) (99/64 - 158/95)  BP(mean): 106 (11 Jun 2021 16:00) (72 - 118)  ABP: 123/51 (11 Jun 2021 17:00) (91/49 - 163/90)  ABP(mean): 70 (11 Jun 2021 17:00) (62 - 115)  RR: 21 (11 Jun 2021 17:00) (13 - 26)  SpO2: 96% (11 Jun 2021 17:00) (92% - 99%)    Adult Advanced Hemodynamics Last 24 Hrs  CVP(mm Hg): --  CVP(cm H2O): --  CO: --  CI: --  PA: --  PA(mean): --  PCWP: --  SVR: --  SVRI: --  PVR: --  PVRI: --, ABG - ( 11 Jun 2021 14:21 )  pH, Arterial: 7.42  pH, Blood: x     /  pCO2: 36    /  pO2: 81    / HCO3: 23    / Base Excess: -0.7  /  SaO2: 98.0                Intake and output was reviewed and the fluid balance was calculated  Daily     Daily   I&O's Summary    10 Tan 2021 07:01  -  11 Jun 2021 07:00  --------------------------------------------------------  IN: 1140 mL / OUT: 1835 mL / NET: -695 mL    11 Jun 2021 07:01  -  11 Jun 2021 18:21  --------------------------------------------------------  IN: 1110 mL / OUT: 695 mL / NET: 415 mL        All lines and drain sites were assessed  Glycemic trend was reviewedCAPILLARY BLOOD GLUCOSE      POCT Blood Glucose.: 144 mg/dL (10 Tan 2021 05:03)    No acute change in mental status  Auscultation of the chest reveals equal bs  Abdomen is soft  Extremities are warm and well perfused  Wounds appear clean and unremarkable  Antibiotics are periop    labs  CBC Full  -  ( 11 Jun 2021 14:25 )  WBC Count : 13.47 K/uL  RBC Count : 3.27 M/uL  Hemoglobin : 9.6 g/dL  Hematocrit : 28.6 %  Platelet Count - Automated : 149 K/uL  Mean Cell Volume : 87.5 fl  Mean Cell Hemoglobin : 29.4 pg  Mean Cell Hemoglobin Concentration : 33.6 gm/dL  Auto Neutrophil # : x  Auto Lymphocyte # : x  Auto Monocyte # : x  Auto Eosinophil # : x  Auto Basophil # : x  Auto Neutrophil % : x  Auto Lymphocyte % : x  Auto Monocyte % : x  Auto Eosinophil % : x  Auto Basophil % : x    06-11    134<L>  |  102  |  36<H>  ----------------------------<  162<H>  4.1   |  22  |  1.22    Ca    8.4      11 Jun 2021 14:25  Phos  2.9     06-11  Mg     2.1     06-11      PT/INR - ( 11 Jun 2021 14:25 )   PT: 12.7 sec;   INR: 1.06          PTT - ( 11 Jun 2021 14:25 )  PTT:27.3 sec  The current medications were reviewed   MEDICATIONS  (STANDING):  aMIOdarone    Tablet   Oral   aMIOdarone    Tablet 400 milliGRAM(s) Oral every 8 hours  aspirin enteric coated 81 milliGRAM(s) Oral daily  atorvastatin 40 milliGRAM(s) Oral at bedtime  carvedilol 12.5 milliGRAM(s) Oral every 12 hours  chlorhexidine 2% Cloths 1 Application(s) Topical <User Schedule>  heparin   Injectable 5000 Unit(s) SubCutaneous every 8 hours  pantoprazole    Tablet 40 milliGRAM(s) Oral before breakfast  polyethylene glycol 3350 17 Gram(s) Oral daily  sodium chloride 0.9%. 1000 milliLiter(s) (10 mL/Hr) IV Continuous <Continuous>  sodium chloride 3%  Inhalation 3 milliLiter(s) Inhalation every 6 hours  testosterone patch 4 mG/24 Hr(s) 4 milliGRAM(s) Transdermal daily    MEDICATIONS  (PRN):  acetaminophen   Tablet .. 650 milliGRAM(s) Oral every 6 hours PRN Mild Pain (1 - 3)  albuterol/ipratropium for Nebulization 3 milliLiter(s) Nebulizer every 6 hours PRN Respirations Above___  oxyCODONE    IR 5 milliGRAM(s) Oral every 4 hours PRN Moderate Pain (4 - 6)  oxyCODONE    IR 10 milliGRAM(s) Oral every 6 hours PRN Severe Pain (7 - 10)       PROBLEM LIST/ ASSESSMENT:  HEALTH ISSUES - PROBLEM Dx:      ,   Patient is a 61y old  Male who presents with a chief complaint of aortic regurgitation, mitral regurgitation, thoracic aortic aneurysm, and coronary artery disease (11 Jun 2021 14:16)     s/p  AVR; replacement of aortic root; ascending aorta and maite arch replacement; PFO closure; EF 40%;    acute changes include acute respiratory failure    My plan includes :    Maintain MAP >80 ( hx of TIA/CKD)  continue amiodarone load and beta blockers  Heparin infusion ; goal pTT 45-50  replete lytes to help maintain sinus rhythm  chest pt/pulm toilet/inhalers  diurese to maintain negative balance    close hemodynamic, ventilatory and drain monitoring and management per post op routine    Monitor for arrhythmias and monitor parameters for organ perfusion  monitor neurologic status  Head of the bed should remain elevated to 45 deg .   chest PT and IS will be encouraged  monitor adequacy of oxygenation and ventilation and attempt to wean oxygen  Nutritional goals will be met using po eventually , ensure adequate caloric intake and montior the same  Stress ulcer and VTE prophylaxis will be achieved    Glycemic control is satisfactory  Electrolytes have been repleted as necessary and wound care has been carried out. Pain control has been achieved.   agressive physical therapy and early mobility and ambulation goals will be met   The family was updated about the course and plan  CRITICAL CARE TIME SPENT in evaluation and management, reassessments, review and interpretation of labs and x-rays, ventilator and hemodynamic management, formulating a plan and coordinating care: _90__ MIN.  Time does not include procedural time.  CTICU ATTENDING     					    Compa Donahue MD

## 2021-06-11 NOTE — DIETITIAN INITIAL EVALUATION ADULT. - OTHER INFO
61 year old male, ex-smoker (quit 2 months ago), with a past medical history of hypertension, hyperlipidemia, CKD stage 3, TIA (2007), CAD s/p NSTEMI (Jan 2019), drug and alcohol misuse (cocaine/marijuana 30 yr hx), recent hospitalization for pneumonia and heart failure exacerbation, presents for evaluation and management of his valvular disease, dilated aorta, and coronary artery disease. NYHA Class II-III. He is referred by Dr. Gerry Rushing. Went to OR 6/7 for PFO closure, remains in CTICU for post-op management at this time. Observed resting in chair on HFNC, lethargic. Notes fair-good intake at present and while at home. Hasn't moved bowels much since surgery, denies n/v/d, chewing/ swallowing issues or pain impacting intake, skin with kristyn 18, MSI, + edema to B/L feet. Encouraged intake through day and reinforced purpose of Ensure + low Na diet. Receptive to education and wishing to rest. Denies recent wt changes, NKFA. Will follow per protocol.

## 2021-06-12 LAB
ANION GAP SERPL CALC-SCNC: 10 MMOL/L — SIGNIFICANT CHANGE UP (ref 5–17)
ANION GAP SERPL CALC-SCNC: 12 MMOL/L — SIGNIFICANT CHANGE UP (ref 5–17)
APTT BLD: 26.4 SEC — LOW (ref 27.5–35.5)
BUN SERPL-MCNC: 31 MG/DL — HIGH (ref 7–23)
BUN SERPL-MCNC: 33 MG/DL — HIGH (ref 7–23)
CALCIUM SERPL-MCNC: 8.1 MG/DL — LOW (ref 8.4–10.5)
CALCIUM SERPL-MCNC: 8.4 MG/DL — SIGNIFICANT CHANGE UP (ref 8.4–10.5)
CHLORIDE SERPL-SCNC: 103 MMOL/L — SIGNIFICANT CHANGE UP (ref 96–108)
CHLORIDE SERPL-SCNC: 103 MMOL/L — SIGNIFICANT CHANGE UP (ref 96–108)
CO2 SERPL-SCNC: 22 MMOL/L — SIGNIFICANT CHANGE UP (ref 22–31)
CO2 SERPL-SCNC: 23 MMOL/L — SIGNIFICANT CHANGE UP (ref 22–31)
CREAT SERPL-MCNC: 1.09 MG/DL — SIGNIFICANT CHANGE UP (ref 0.5–1.3)
CREAT SERPL-MCNC: 1.11 MG/DL — SIGNIFICANT CHANGE UP (ref 0.5–1.3)
GAS PNL BLDA: SIGNIFICANT CHANGE UP
GLUCOSE SERPL-MCNC: 108 MG/DL — HIGH (ref 70–99)
GLUCOSE SERPL-MCNC: 119 MG/DL — HIGH (ref 70–99)
HCT VFR BLD CALC: 28.1 % — LOW (ref 39–50)
HCT VFR BLD CALC: 29.7 % — LOW (ref 39–50)
HGB BLD-MCNC: 9.4 G/DL — LOW (ref 13–17)
HGB BLD-MCNC: 9.9 G/DL — LOW (ref 13–17)
INR BLD: 1.06 — SIGNIFICANT CHANGE UP (ref 0.88–1.16)
MAGNESIUM SERPL-MCNC: 2 MG/DL — SIGNIFICANT CHANGE UP (ref 1.6–2.6)
MAGNESIUM SERPL-MCNC: 2.2 MG/DL — SIGNIFICANT CHANGE UP (ref 1.6–2.6)
MCHC RBC-ENTMCNC: 28.4 PG — SIGNIFICANT CHANGE UP (ref 27–34)
MCHC RBC-ENTMCNC: 28.9 PG — SIGNIFICANT CHANGE UP (ref 27–34)
MCHC RBC-ENTMCNC: 33.3 GM/DL — SIGNIFICANT CHANGE UP (ref 32–36)
MCHC RBC-ENTMCNC: 33.5 GM/DL — SIGNIFICANT CHANGE UP (ref 32–36)
MCV RBC AUTO: 85.3 FL — SIGNIFICANT CHANGE UP (ref 80–100)
MCV RBC AUTO: 86.5 FL — SIGNIFICANT CHANGE UP (ref 80–100)
NRBC # BLD: 0 /100 WBCS — SIGNIFICANT CHANGE UP (ref 0–0)
NRBC # BLD: 0 /100 WBCS — SIGNIFICANT CHANGE UP (ref 0–0)
PHOSPHATE SERPL-MCNC: 2.9 MG/DL — SIGNIFICANT CHANGE UP (ref 2.5–4.5)
PHOSPHATE SERPL-MCNC: 3.3 MG/DL — SIGNIFICANT CHANGE UP (ref 2.5–4.5)
PLATELET # BLD AUTO: 153 K/UL — SIGNIFICANT CHANGE UP (ref 150–400)
PLATELET # BLD AUTO: 182 K/UL — SIGNIFICANT CHANGE UP (ref 150–400)
POTASSIUM SERPL-MCNC: 3.6 MMOL/L — SIGNIFICANT CHANGE UP (ref 3.5–5.3)
POTASSIUM SERPL-MCNC: 4.2 MMOL/L — SIGNIFICANT CHANGE UP (ref 3.5–5.3)
POTASSIUM SERPL-SCNC: 3.6 MMOL/L — SIGNIFICANT CHANGE UP (ref 3.5–5.3)
POTASSIUM SERPL-SCNC: 4.2 MMOL/L — SIGNIFICANT CHANGE UP (ref 3.5–5.3)
PROTHROM AB SERPL-ACNC: 12.7 SEC — SIGNIFICANT CHANGE UP (ref 10.6–13.6)
RBC # BLD: 3.25 M/UL — LOW (ref 4.2–5.8)
RBC # BLD: 3.48 M/UL — LOW (ref 4.2–5.8)
RBC # FLD: 15.4 % — HIGH (ref 10.3–14.5)
RBC # FLD: 15.5 % — HIGH (ref 10.3–14.5)
SODIUM SERPL-SCNC: 136 MMOL/L — SIGNIFICANT CHANGE UP (ref 135–145)
SODIUM SERPL-SCNC: 137 MMOL/L — SIGNIFICANT CHANGE UP (ref 135–145)
WBC # BLD: 11.39 K/UL — HIGH (ref 3.8–10.5)
WBC # BLD: 12.72 K/UL — HIGH (ref 3.8–10.5)
WBC # FLD AUTO: 11.39 K/UL — HIGH (ref 3.8–10.5)
WBC # FLD AUTO: 12.72 K/UL — HIGH (ref 3.8–10.5)

## 2021-06-12 PROCEDURE — 99292 CRITICAL CARE ADDL 30 MIN: CPT | Mod: 25

## 2021-06-12 PROCEDURE — 31645 BRNCHSC W/THER ASPIR 1ST: CPT

## 2021-06-12 PROCEDURE — 71045 X-RAY EXAM CHEST 1 VIEW: CPT | Mod: 26,77

## 2021-06-12 PROCEDURE — 71045 X-RAY EXAM CHEST 1 VIEW: CPT | Mod: 26

## 2021-06-12 PROCEDURE — 99291 CRITICAL CARE FIRST HOUR: CPT | Mod: 25

## 2021-06-12 PROCEDURE — 76604 US EXAM CHEST: CPT | Mod: 26

## 2021-06-12 RX ORDER — PIPERACILLIN AND TAZOBACTAM 4; .5 G/20ML; G/20ML
3.38 INJECTION, POWDER, LYOPHILIZED, FOR SOLUTION INTRAVENOUS ONCE
Refills: 0 | Status: COMPLETED | OUTPATIENT
Start: 2021-06-12 | End: 2021-06-12

## 2021-06-12 RX ORDER — PIPERACILLIN AND TAZOBACTAM 4; .5 G/20ML; G/20ML
3.38 INJECTION, POWDER, LYOPHILIZED, FOR SOLUTION INTRAVENOUS EVERY 6 HOURS
Refills: 0 | Status: DISCONTINUED | OUTPATIENT
Start: 2021-06-12 | End: 2021-06-14

## 2021-06-12 RX ORDER — IPRATROPIUM/ALBUTEROL SULFATE 18-103MCG
3 AEROSOL WITH ADAPTER (GRAM) INHALATION ONCE
Refills: 0 | Status: COMPLETED | OUTPATIENT
Start: 2021-06-12 | End: 2021-06-13

## 2021-06-12 RX ORDER — ALBUMIN HUMAN 25 %
50 VIAL (ML) INTRAVENOUS ONCE
Refills: 0 | Status: COMPLETED | OUTPATIENT
Start: 2021-06-12 | End: 2021-06-12

## 2021-06-12 RX ORDER — SERTRALINE 25 MG/1
25 TABLET, FILM COATED ORAL DAILY
Refills: 0 | Status: DISCONTINUED | OUTPATIENT
Start: 2021-06-12 | End: 2021-06-18

## 2021-06-12 RX ORDER — ALBUMIN HUMAN 25 %
250 VIAL (ML) INTRAVENOUS ONCE
Refills: 0 | Status: COMPLETED | OUTPATIENT
Start: 2021-06-12 | End: 2021-06-12

## 2021-06-12 RX ORDER — POTASSIUM CHLORIDE 20 MEQ
20 PACKET (EA) ORAL ONCE
Refills: 0 | Status: COMPLETED | OUTPATIENT
Start: 2021-06-12 | End: 2021-06-12

## 2021-06-12 RX ORDER — ACETYLCYSTEINE 200 MG/ML
4 VIAL (ML) MISCELLANEOUS EVERY 6 HOURS
Refills: 0 | Status: COMPLETED | OUTPATIENT
Start: 2021-06-12 | End: 2021-06-13

## 2021-06-12 RX ORDER — CARVEDILOL PHOSPHATE 80 MG/1
25 CAPSULE, EXTENDED RELEASE ORAL EVERY 12 HOURS
Refills: 0 | Status: DISCONTINUED | OUTPATIENT
Start: 2021-06-12 | End: 2021-06-18

## 2021-06-12 RX ORDER — MIDAZOLAM HYDROCHLORIDE 1 MG/ML
2 INJECTION, SOLUTION INTRAMUSCULAR; INTRAVENOUS ONCE
Refills: 0 | Status: DISCONTINUED | OUTPATIENT
Start: 2021-06-12 | End: 2021-06-12

## 2021-06-12 RX ADMIN — Medication 4 MILLILITER(S): at 04:53

## 2021-06-12 RX ADMIN — Medication 4 MILLIGRAM(S): at 12:30

## 2021-06-12 RX ADMIN — OXYCODONE HYDROCHLORIDE 10 MILLIGRAM(S): 5 TABLET ORAL at 06:15

## 2021-06-12 RX ADMIN — PIPERACILLIN AND TAZOBACTAM 200 GRAM(S): 4; .5 INJECTION, POWDER, LYOPHILIZED, FOR SOLUTION INTRAVENOUS at 15:31

## 2021-06-12 RX ADMIN — CARVEDILOL PHOSPHATE 25 MILLIGRAM(S): 80 CAPSULE, EXTENDED RELEASE ORAL at 18:50

## 2021-06-12 RX ADMIN — AMIODARONE HYDROCHLORIDE 400 MILLIGRAM(S): 400 TABLET ORAL at 14:38

## 2021-06-12 RX ADMIN — Medication 650 MILLIGRAM(S): at 17:51

## 2021-06-12 RX ADMIN — CARVEDILOL PHOSPHATE 12.5 MILLIGRAM(S): 80 CAPSULE, EXTENDED RELEASE ORAL at 05:38

## 2021-06-12 RX ADMIN — SODIUM CHLORIDE 3 MILLILITER(S): 9 INJECTION INTRAMUSCULAR; INTRAVENOUS; SUBCUTANEOUS at 18:07

## 2021-06-12 RX ADMIN — ATORVASTATIN CALCIUM 40 MILLIGRAM(S): 80 TABLET, FILM COATED ORAL at 21:58

## 2021-06-12 RX ADMIN — Medication 4 MILLIGRAM(S): at 18:47

## 2021-06-12 RX ADMIN — Medication 100 MILLIEQUIVALENT(S): at 04:30

## 2021-06-12 RX ADMIN — Medication 4 MILLILITER(S): at 18:06

## 2021-06-12 RX ADMIN — HEPARIN SODIUM 5000 UNIT(S): 5000 INJECTION INTRAVENOUS; SUBCUTANEOUS at 05:37

## 2021-06-12 RX ADMIN — SODIUM CHLORIDE 3 MILLILITER(S): 9 INJECTION INTRAMUSCULAR; INTRAVENOUS; SUBCUTANEOUS at 05:15

## 2021-06-12 RX ADMIN — Medication 4 MILLIGRAM(S): at 14:38

## 2021-06-12 RX ADMIN — PANTOPRAZOLE SODIUM 40 MILLIGRAM(S): 20 TABLET, DELAYED RELEASE ORAL at 05:38

## 2021-06-12 RX ADMIN — Medication 125 MILLILITER(S): at 15:31

## 2021-06-12 RX ADMIN — Medication 50 MILLILITER(S): at 17:05

## 2021-06-12 RX ADMIN — HEPARIN SODIUM 5000 UNIT(S): 5000 INJECTION INTRAVENOUS; SUBCUTANEOUS at 21:58

## 2021-06-12 RX ADMIN — SODIUM CHLORIDE 3 MILLILITER(S): 9 INJECTION INTRAMUSCULAR; INTRAVENOUS; SUBCUTANEOUS at 09:01

## 2021-06-12 RX ADMIN — Medication 81 MILLIGRAM(S): at 14:39

## 2021-06-12 RX ADMIN — HEPARIN SODIUM 5000 UNIT(S): 5000 INJECTION INTRAVENOUS; SUBCUTANEOUS at 14:38

## 2021-06-12 RX ADMIN — PIPERACILLIN AND TAZOBACTAM 200 GRAM(S): 4; .5 INJECTION, POWDER, LYOPHILIZED, FOR SOLUTION INTRAVENOUS at 21:58

## 2021-06-12 RX ADMIN — Medication 3 MILLILITER(S): at 04:45

## 2021-06-12 RX ADMIN — AMIODARONE HYDROCHLORIDE 400 MILLIGRAM(S): 400 TABLET ORAL at 05:37

## 2021-06-12 RX ADMIN — AMIODARONE HYDROCHLORIDE 400 MILLIGRAM(S): 400 TABLET ORAL at 21:58

## 2021-06-12 RX ADMIN — OXYCODONE HYDROCHLORIDE 10 MILLIGRAM(S): 5 TABLET ORAL at 23:18

## 2021-06-12 RX ADMIN — Medication 4 MILLILITER(S): at 11:51

## 2021-06-12 RX ADMIN — POLYETHYLENE GLYCOL 3350 17 GRAM(S): 17 POWDER, FOR SOLUTION ORAL at 14:39

## 2021-06-12 RX ADMIN — MIDAZOLAM HYDROCHLORIDE 2 MILLIGRAM(S): 1 INJECTION, SOLUTION INTRAMUSCULAR; INTRAVENOUS at 15:31

## 2021-06-12 RX ADMIN — OXYCODONE HYDROCHLORIDE 10 MILLIGRAM(S): 5 TABLET ORAL at 07:00

## 2021-06-12 RX ADMIN — CHLORHEXIDINE GLUCONATE 1 APPLICATION(S): 213 SOLUTION TOPICAL at 05:39

## 2021-06-12 RX ADMIN — Medication 4 MILLIGRAM(S): at 06:10

## 2021-06-12 NOTE — PROGRESS NOTE ADULT - SUBJECTIVE AND OBJECTIVE BOX
CTICU  CRITICAL  CARE  attending     Hand off received 					   Pertinent clinical, laboratory, radiographic, hemodynamic, echocardiographic, respiratory data, microbiologic data and chart were reviewed and analyzed frequently throughout the course of the day and night  Patient seen and examined with CTS/ SH attending at bedside  Pt is a 61y , Male, HEALTH ISSUES - PROBLEM Dx:      , FAMILY HISTORY:  FH: hypertension (Mother, Sibling)    PAST MEDICAL & SURGICAL HISTORY:  Hypertension    Hyperlipidemia    NSTEMI (non-ST elevation myocardial infarction)    CKD (chronic kidney disease)    TIA (transient ischemic attack)    CAD (coronary artery disease)    Aortic regurgitation    Thoracic aortic aneurysm    Aortic insufficiency    Mitral regurgitation    Aortic aneurysm    No significant past surgical history      Patient is a 61y old  Male who presents with a chief complaint of aortic regurgitation, mitral regurgitation, thoracic aortic aneurysm, and coronary artery disease (12 Jun 2021 18:21)      14 system review limited by mentation and multiorgan morbidity     Vital signs, hemodynamic and respiratory parameters were reviewed from the bedside nursing flowsheet.  ICU Vital Signs Last 24 Hrs  T(C): 36.1 (12 Jun 2021 20:57), Max: 36.8 (12 Jun 2021 17:08)  T(F): 96.9 (12 Jun 2021 20:57), Max: 98.3 (12 Jun 2021 17:08)  HR: 79 (12 Jun 2021 23:00) (70 - 86)  BP: --  BP(mean): --  ABP: 123/69 (12 Jun 2021 23:00) (123/56 - 168/74)  ABP(mean): 87 (12 Jun 2021 23:00) (76 - 113)  RR: 16 (12 Jun 2021 22:00) (8 - 27)  SpO2: 97% (12 Jun 2021 23:00) (94% - 100%)    Adult Advanced Hemodynamics Last 24 Hrs  CVP(mm Hg): --  CVP(cm H2O): --  CO: --  CI: --  PA: --  PA(mean): --  PCWP: --  SVR: --  SVRI: --  PVR: --  PVRI: --, ABG - ( 12 Jun 2021 10:35 )  pH, Arterial: 7.43  pH, Blood: x     /  pCO2: 34    /  pO2: 77    / HCO3: 23    / Base Excess: -1.1  /  SaO2: 97.3                Intake and output was reviewed and the fluid balance was calculated  Daily     Daily   I&O's Summary    11 Jun 2021 07:01  -  12 Jun 2021 07:00  --------------------------------------------------------  IN: 1670 mL / OUT: 1645 mL / NET: 25 mL    12 Jun 2021 07:01  -  12 Jun 2021 23:16  --------------------------------------------------------  IN: 810 mL / OUT: 655 mL / NET: 155 mL        All lines and drain sites were assessed  Glycemic trend was reviewedCAPILLARY BLOOD GLUCOSE        No acute change in focality  Auscultation of the chest reveals equal bs  Abdomen is soft  Extremities are warm and well perfused  Wounds appear clean and unremarkable  Antibiotics are periop    labs  CBC Full  -  ( 12 Jun 2021 10:38 )  WBC Count : 12.72 K/uL  RBC Count : 3.48 M/uL  Hemoglobin : 9.9 g/dL  Hematocrit : 29.7 %  Platelet Count - Automated : 182 K/uL  Mean Cell Volume : 85.3 fl  Mean Cell Hemoglobin : 28.4 pg  Mean Cell Hemoglobin Concentration : 33.3 gm/dL  Auto Neutrophil # : x  Auto Lymphocyte # : x  Auto Monocyte # : x  Auto Eosinophil # : x  Auto Basophil # : x  Auto Neutrophil % : x  Auto Lymphocyte % : x  Auto Monocyte % : x  Auto Eosinophil % : x  Auto Basophil % : x    06-12    137  |  103  |  33<H>  ----------------------------<  119<H>  4.2   |  22  |  1.09    Ca    8.4      12 Jun 2021 10:38  Phos  2.9     06-12  Mg     2.0     06-12      PT/INR - ( 12 Jun 2021 10:38 )   PT: 12.7 sec;   INR: 1.06          PTT - ( 12 Jun 2021 10:38 )  PTT:26.4 sec  The current medications were reviewed   MEDICATIONS  (STANDING):  acetylcysteine 20%  Inhalation 4 milliLiter(s) Inhalation every 6 hours  albuterol/ipratropium for Nebulization. 3 milliLiter(s) Nebulizer once  aMIOdarone    Tablet 400 milliGRAM(s) Oral every 8 hours  aMIOdarone    Tablet   Oral   aspirin enteric coated 81 milliGRAM(s) Oral daily  atorvastatin 40 milliGRAM(s) Oral at bedtime  carvedilol 25 milliGRAM(s) Oral every 12 hours  chlorhexidine 2% Cloths 1 Application(s) Topical <User Schedule>  heparin   Injectable 5000 Unit(s) SubCutaneous every 8 hours  pantoprazole    Tablet 40 milliGRAM(s) Oral before breakfast  piperacillin/tazobactam IVPB.. 3.375 Gram(s) IV Intermittent every 6 hours  polyethylene glycol 3350 17 Gram(s) Oral daily  sertraline 25 milliGRAM(s) Oral daily  sodium chloride 0.9%. 1000 milliLiter(s) (10 mL/Hr) IV Continuous <Continuous>  sodium chloride 3%  Inhalation 3 milliLiter(s) Inhalation every 6 hours  testosterone patch 4 mG/24 Hr(s) 4 milliGRAM(s) Transdermal daily    MEDICATIONS  (PRN):  acetaminophen   Tablet .. 650 milliGRAM(s) Oral every 6 hours PRN Mild Pain (1 - 3)  oxyCODONE    IR 5 milliGRAM(s) Oral every 4 hours PRN Moderate Pain (4 - 6)  oxyCODONE    IR 10 milliGRAM(s) Oral every 6 hours PRN Severe Pain (7 - 10)       PROBLEM LIST/ ASSESSMENT:  HEALTH ISSUES - PROBLEM Dx:      ,   Patient is a 61y old  Male who presents with a chief complaint of aortic regurgitation, mitral regurgitation, thoracic aortic aneurysm, and coronary artery disease (12 Jun 2021 18:21)     s/p cardiac surgery        added zoloft  cont abx  bipap o/n, NIPPV  + 165cc, lasix in am         My plan includes :  close hemodynamic, ventilatory and drain monitoring and management per post op routine    Monitor for arrhythmias and monitor parameters for organ perfusion  beta blockade not administered due to hemodynamic instability and bradycardia  monitor neurologic status  Head of the bed should remain elevated to 45 deg .   chest PT and IS will be encouraged  monitor adequacy of oxygenation and ventilation and attempt to wean oxygen  antibiotic regimen will be tailored to the clinical, laboratory and microbiologic data  Nutritional goals will be met using po eventually , ensure adequate caloric intake and montior the same  Stress ulcer and VTE prophylaxis will be achieved    Glycemic control is satisfactory  Electrolytes have been repleted as necessary and wound care has been carried out. Pain control has been achieved.   agressive physical therapy and early mobility and ambulation goals will be met   The family was updated about the course and plan  CRITICAL CARE TIME personally provided by me  in evaluation and management, reassessments, review and interpretation of labs and x-rays, ventilator and hemodynamic management, formulating a plan and coordinating care: ___30____ MIN.  Time does not include procedural time. Time spent was non routine post-operarive caRE and included multiple and repeated evaluations at the bedside  CTICU ATTENDING     					    Noe Hooker MD

## 2021-06-12 NOTE — PROGRESS NOTE ADULT - SUBJECTIVE AND OBJECTIVE BOX
CTICU  CRITICAL  CARE  attending     Hand off received 					   Pertinent clinical, laboratory, radiographic, hemodynamic, echocardiographic, respiratory data, microbiologic data and chart were reviewed and analyzed frequently throughout the course of the day and night  Patient seen and examined with CTS/ SH attending at bedside  Pt is a 61y , Male, HEALTH ISSUES - PROBLEM Dx:      , FAMILY HISTORY:  FH: hypertension (Mother, Sibling)    PAST MEDICAL & SURGICAL HISTORY:  Hypertension    Hyperlipidemia    NSTEMI (non-ST elevation myocardial infarction)    CKD (chronic kidney disease)    TIA (transient ischemic attack)    CAD (coronary artery disease)    Aortic regurgitation    Thoracic aortic aneurysm    Aortic insufficiency    Mitral regurgitation    Aortic aneurysm    No significant past surgical history      Patient is a 61y old  Male who presents with a chief complaint of aortic regurgitation, mitral regurgitation, thoracic aortic aneurysm, and coronary artery disease (11 Jun 2021 22:49)      14 system review limited by mentation and multiorgan morbidity     Vital signs, hemodynamic and respiratory parameters were reviewed from the bedside nursing flowsheet.  ICU Vital Signs Last 24 Hrs  T(C): 36.8 (12 Jun 2021 17:08), Max: 36.9 (11 Jun 2021 22:05)  T(F): 98.3 (12 Jun 2021 17:08), Max: 98.4 (11 Jun 2021 22:05)  HR: 81 (12 Jun 2021 17:00) (77 - 87)  BP: 113/55 (11 Jun 2021 21:00) (10/57 - 113/55)  BP(mean): 77 (11 Jun 2021 21:00) (77 - 77)  ABP: 166/88 (12 Jun 2021 17:00) (102/41 - 168/74)  ABP(mean): 113 (12 Jun 2021 17:00) (60 - 113)  RR: 18 (12 Jun 2021 16:25) (8 - 27)  SpO2: 100% (12 Jun 2021 17:00) (94% - 100%)    Adult Advanced Hemodynamics Last 24 Hrs  CVP(mm Hg): --  CVP(cm H2O): --  CO: --  CI: --  PA: --  PA(mean): --  PCWP: --  SVR: --  SVRI: --  PVR: --  PVRI: --, ABG - ( 12 Jun 2021 10:35 )  pH, Arterial: 7.43  pH, Blood: x     /  pCO2: 34    /  pO2: 77    / HCO3: 23    / Base Excess: -1.1  /  SaO2: 97.3                Intake and output was reviewed and the fluid balance was calculated  Daily     Daily   I&O's Summary    11 Jun 2021 07:01  -  12 Jun 2021 07:00  --------------------------------------------------------  IN: 1670 mL / OUT: 1645 mL / NET: 25 mL    12 Jun 2021 07:01  -  12 Jun 2021 18:03  --------------------------------------------------------  IN: 760 mL / OUT: 465 mL / NET: 295 mL        All lines and drain sites were assessed  Glycemic trend was reviewedCAPILLARY BLOOD GLUCOSE        No acute change in focality  Auscultation of the chest reveals equal bs  Abdomen is soft  Extremities are warm and well perfused  Wounds appear clean and unremarkable  Antibiotics are periop    labs  CBC Full  -  ( 12 Jun 2021 10:38 )  WBC Count : 12.72 K/uL  RBC Count : 3.48 M/uL  Hemoglobin : 9.9 g/dL  Hematocrit : 29.7 %  Platelet Count - Automated : 182 K/uL  Mean Cell Volume : 85.3 fl  Mean Cell Hemoglobin : 28.4 pg  Mean Cell Hemoglobin Concentration : 33.3 gm/dL  Auto Neutrophil # : x  Auto Lymphocyte # : x  Auto Monocyte # : x  Auto Eosinophil # : x  Auto Basophil # : x  Auto Neutrophil % : x  Auto Lymphocyte % : x  Auto Monocyte % : x  Auto Eosinophil % : x  Auto Basophil % : x    06-12    137  |  103  |  33<H>  ----------------------------<  119<H>  4.2   |  22  |  1.09    Ca    8.4      12 Jun 2021 10:38  Phos  2.9     06-12  Mg     2.0     06-12      PT/INR - ( 12 Jun 2021 10:38 )   PT: 12.7 sec;   INR: 1.06          PTT - ( 12 Jun 2021 10:38 )  PTT:26.4 sec  The current medications were reviewed   MEDICATIONS  (STANDING):  acetylcysteine 20%  Inhalation 4 milliLiter(s) Inhalation every 6 hours  albuterol/ipratropium for Nebulization. 3 milliLiter(s) Nebulizer once  aMIOdarone    Tablet   Oral   aMIOdarone    Tablet 400 milliGRAM(s) Oral every 8 hours  aspirin enteric coated 81 milliGRAM(s) Oral daily  atorvastatin 40 milliGRAM(s) Oral at bedtime  carvedilol 25 milliGRAM(s) Oral every 12 hours  chlorhexidine 2% Cloths 1 Application(s) Topical <User Schedule>  heparin   Injectable 5000 Unit(s) SubCutaneous every 8 hours  pantoprazole    Tablet 40 milliGRAM(s) Oral before breakfast  piperacillin/tazobactam IVPB.. 3.375 Gram(s) IV Intermittent every 6 hours  polyethylene glycol 3350 17 Gram(s) Oral daily  sodium chloride 0.9%. 1000 milliLiter(s) (10 mL/Hr) IV Continuous <Continuous>  sodium chloride 3%  Inhalation 3 milliLiter(s) Inhalation every 6 hours  testosterone patch 4 mG/24 Hr(s) 4 milliGRAM(s) Transdermal daily    MEDICATIONS  (PRN):  acetaminophen   Tablet .. 650 milliGRAM(s) Oral every 6 hours PRN Mild Pain (1 - 3)  oxyCODONE    IR 5 milliGRAM(s) Oral every 4 hours PRN Moderate Pain (4 - 6)  oxyCODONE    IR 10 milliGRAM(s) Oral every 6 hours PRN Severe Pain (7 - 10)       PROBLEM LIST/ ASSESSMENT:  HEALTH ISSUES - PROBLEM Dx:      ,   Patient is a 61y old  Male who presents with a chief complaint of aortic regurgitation, mitral regurgitation, thoracic aortic aneurysm, and coronary artery disease (11 Jun 2021 22:49)     s/p cardiac surgery                My plan includes :  close hemodynamic, ventilatory and drain monitoring and management per post op routine    Monitor for arrhythmias and monitor parameters for organ perfusion  beta blockade not administered due to hemodynamic instability and bradycardia  monitor neurologic status  Head of the bed should remain elevated to 45 deg .   chest PT and IS will be encouraged  monitor adequacy of oxygenation and ventilation and attempt to wean oxygen  antibiotic regimen will be tailored to the clinical, laboratory and microbiologic data  Nutritional goals will be met using po eventually , ensure adequate caloric intake and montior the same  Stress ulcer and VTE prophylaxis will be achieved    Glycemic control is satisfactory  Electrolytes have been repleted as necessary and wound care has been carried out. Pain control has been achieved.   agressive physical therapy and early mobility and ambulation goals will be met   The family was updated about the course and plan  CRITICAL CARE TIME personally provided by me  in evaluation and management, reassessments, review and interpretation of labs and x-rays, ventilator and hemodynamic management, formulating a plan and coordinating care: ___90____ MIN.  Time does not include procedural time. Time spent was non routine post-operarive caRE and included multiple and repeated evaluations at the bedside  CTICU ATTENDING     					    Jack Garcia MD

## 2021-06-12 NOTE — PROGRESS NOTE ADULT - SUBJECTIVE AND OBJECTIVE BOX
Point of care ultrasound performed to rule out pericardial effusion  parasternal long axis view , apical 4 chamber and bilateral pleural views performed   no pleural effusion noted  lung sliding present

## 2021-06-12 NOTE — PROGRESS NOTE ADULT - SUBJECTIVE AND OBJECTIVE BOX
Name of procedure – Flexible fiberoptic bronchoscopy        Flexible fiberoptic bronchoscopy was performed under precede anxiolyis    Pre-procedural assessment reveals no risk of Tb    The scope was advanced nasotracheally   VC were sharp and moving well…    The margaret was sharp  Right LL and RML air way were patent , scant secretions  LLL and ROMEO air way were patent , scant secretions    CXR confirmed no pneumothorax post bronch  There were no complications  The patient tolerated the procedure well

## 2021-06-13 LAB
ANION GAP SERPL CALC-SCNC: 10 MMOL/L — SIGNIFICANT CHANGE UP (ref 5–17)
BUN SERPL-MCNC: 33 MG/DL — HIGH (ref 7–23)
CALCIUM SERPL-MCNC: 8.4 MG/DL — SIGNIFICANT CHANGE UP (ref 8.4–10.5)
CHLORIDE SERPL-SCNC: 103 MMOL/L — SIGNIFICANT CHANGE UP (ref 96–108)
CO2 SERPL-SCNC: 23 MMOL/L — SIGNIFICANT CHANGE UP (ref 22–31)
CREAT SERPL-MCNC: 1.05 MG/DL — SIGNIFICANT CHANGE UP (ref 0.5–1.3)
GAS PNL BLDA: SIGNIFICANT CHANGE UP
GLUCOSE SERPL-MCNC: 114 MG/DL — HIGH (ref 70–99)
GRAM STN FLD: SIGNIFICANT CHANGE UP
HCT VFR BLD CALC: 26.6 % — LOW (ref 39–50)
HGB BLD-MCNC: 8.8 G/DL — LOW (ref 13–17)
MAGNESIUM SERPL-MCNC: 2 MG/DL — SIGNIFICANT CHANGE UP (ref 1.6–2.6)
MAGNESIUM SERPL-MCNC: 2.1 MG/DL — SIGNIFICANT CHANGE UP (ref 1.6–2.6)
MCHC RBC-ENTMCNC: 28.6 PG — SIGNIFICANT CHANGE UP (ref 27–34)
MCHC RBC-ENTMCNC: 33.1 GM/DL — SIGNIFICANT CHANGE UP (ref 32–36)
MCV RBC AUTO: 86.4 FL — SIGNIFICANT CHANGE UP (ref 80–100)
NRBC # BLD: 0 /100 WBCS — SIGNIFICANT CHANGE UP (ref 0–0)
PHOSPHATE SERPL-MCNC: 3.2 MG/DL — SIGNIFICANT CHANGE UP (ref 2.5–4.5)
PLATELET # BLD AUTO: 182 K/UL — SIGNIFICANT CHANGE UP (ref 150–400)
POTASSIUM SERPL-MCNC: 3.8 MMOL/L — SIGNIFICANT CHANGE UP (ref 3.5–5.3)
POTASSIUM SERPL-MCNC: 4.1 MMOL/L — SIGNIFICANT CHANGE UP (ref 3.5–5.3)
POTASSIUM SERPL-SCNC: 3.8 MMOL/L — SIGNIFICANT CHANGE UP (ref 3.5–5.3)
POTASSIUM SERPL-SCNC: 4.1 MMOL/L — SIGNIFICANT CHANGE UP (ref 3.5–5.3)
RBC # BLD: 3.08 M/UL — LOW (ref 4.2–5.8)
RBC # FLD: 15.3 % — HIGH (ref 10.3–14.5)
SODIUM SERPL-SCNC: 136 MMOL/L — SIGNIFICANT CHANGE UP (ref 135–145)
SPECIMEN SOURCE: SIGNIFICANT CHANGE UP
WBC # BLD: 9.1 K/UL — SIGNIFICANT CHANGE UP (ref 3.8–10.5)
WBC # FLD AUTO: 9.1 K/UL — SIGNIFICANT CHANGE UP (ref 3.8–10.5)

## 2021-06-13 PROCEDURE — 99292 CRITICAL CARE ADDL 30 MIN: CPT

## 2021-06-13 PROCEDURE — 71045 X-RAY EXAM CHEST 1 VIEW: CPT | Mod: 26

## 2021-06-13 PROCEDURE — 99291 CRITICAL CARE FIRST HOUR: CPT

## 2021-06-13 RX ORDER — AMIODARONE HYDROCHLORIDE 400 MG/1
400 TABLET ORAL
Refills: 0 | Status: COMPLETED | OUTPATIENT
Start: 2021-06-14 | End: 2021-06-14

## 2021-06-13 RX ORDER — FUROSEMIDE 40 MG
20 TABLET ORAL ONCE
Refills: 0 | Status: COMPLETED | OUTPATIENT
Start: 2021-06-13 | End: 2021-06-13

## 2021-06-13 RX ORDER — AMIODARONE HYDROCHLORIDE 400 MG/1
200 TABLET ORAL DAILY
Refills: 0 | Status: DISCONTINUED | OUTPATIENT
Start: 2021-06-15 | End: 2021-06-18

## 2021-06-13 RX ORDER — POTASSIUM CHLORIDE 20 MEQ
20 PACKET (EA) ORAL ONCE
Refills: 0 | Status: COMPLETED | OUTPATIENT
Start: 2021-06-13 | End: 2021-06-13

## 2021-06-13 RX ORDER — MAGNESIUM OXIDE 400 MG ORAL TABLET 241.3 MG
400 TABLET ORAL ONCE
Refills: 0 | Status: COMPLETED | OUTPATIENT
Start: 2021-06-13 | End: 2021-06-13

## 2021-06-13 RX ADMIN — Medication 20 MILLIGRAM(S): at 06:40

## 2021-06-13 RX ADMIN — ATORVASTATIN CALCIUM 40 MILLIGRAM(S): 80 TABLET, FILM COATED ORAL at 21:30

## 2021-06-13 RX ADMIN — OXYCODONE HYDROCHLORIDE 10 MILLIGRAM(S): 5 TABLET ORAL at 06:38

## 2021-06-13 RX ADMIN — AMIODARONE HYDROCHLORIDE 400 MILLIGRAM(S): 400 TABLET ORAL at 14:23

## 2021-06-13 RX ADMIN — POLYETHYLENE GLYCOL 3350 17 GRAM(S): 17 POWDER, FOR SOLUTION ORAL at 11:41

## 2021-06-13 RX ADMIN — OXYCODONE HYDROCHLORIDE 5 MILLIGRAM(S): 5 TABLET ORAL at 11:42

## 2021-06-13 RX ADMIN — SODIUM CHLORIDE 3 MILLILITER(S): 9 INJECTION INTRAMUSCULAR; INTRAVENOUS; SUBCUTANEOUS at 00:30

## 2021-06-13 RX ADMIN — CARVEDILOL PHOSPHATE 25 MILLIGRAM(S): 80 CAPSULE, EXTENDED RELEASE ORAL at 17:23

## 2021-06-13 RX ADMIN — HEPARIN SODIUM 5000 UNIT(S): 5000 INJECTION INTRAVENOUS; SUBCUTANEOUS at 14:23

## 2021-06-13 RX ADMIN — HEPARIN SODIUM 5000 UNIT(S): 5000 INJECTION INTRAVENOUS; SUBCUTANEOUS at 21:30

## 2021-06-13 RX ADMIN — HEPARIN SODIUM 5000 UNIT(S): 5000 INJECTION INTRAVENOUS; SUBCUTANEOUS at 06:01

## 2021-06-13 RX ADMIN — CHLORHEXIDINE GLUCONATE 1 APPLICATION(S): 213 SOLUTION TOPICAL at 06:01

## 2021-06-13 RX ADMIN — PIPERACILLIN AND TAZOBACTAM 200 GRAM(S): 4; .5 INJECTION, POWDER, LYOPHILIZED, FOR SOLUTION INTRAVENOUS at 21:16

## 2021-06-13 RX ADMIN — PIPERACILLIN AND TAZOBACTAM 200 GRAM(S): 4; .5 INJECTION, POWDER, LYOPHILIZED, FOR SOLUTION INTRAVENOUS at 14:23

## 2021-06-13 RX ADMIN — PIPERACILLIN AND TAZOBACTAM 200 GRAM(S): 4; .5 INJECTION, POWDER, LYOPHILIZED, FOR SOLUTION INTRAVENOUS at 03:52

## 2021-06-13 RX ADMIN — OXYCODONE HYDROCHLORIDE 10 MILLIGRAM(S): 5 TABLET ORAL at 07:46

## 2021-06-13 RX ADMIN — AMIODARONE HYDROCHLORIDE 400 MILLIGRAM(S): 400 TABLET ORAL at 06:01

## 2021-06-13 RX ADMIN — SODIUM CHLORIDE 3 MILLILITER(S): 9 INJECTION INTRAMUSCULAR; INTRAVENOUS; SUBCUTANEOUS at 06:11

## 2021-06-13 RX ADMIN — MAGNESIUM OXIDE 400 MG ORAL TABLET 400 MILLIGRAM(S): 241.3 TABLET ORAL at 12:52

## 2021-06-13 RX ADMIN — PIPERACILLIN AND TAZOBACTAM 200 GRAM(S): 4; .5 INJECTION, POWDER, LYOPHILIZED, FOR SOLUTION INTRAVENOUS at 10:00

## 2021-06-13 RX ADMIN — Medication 81 MILLIGRAM(S): at 11:41

## 2021-06-13 RX ADMIN — Medication 4 MILLIGRAM(S): at 06:04

## 2021-06-13 RX ADMIN — SERTRALINE 25 MILLIGRAM(S): 25 TABLET, FILM COATED ORAL at 11:41

## 2021-06-13 RX ADMIN — OXYCODONE HYDROCHLORIDE 5 MILLIGRAM(S): 5 TABLET ORAL at 12:15

## 2021-06-13 RX ADMIN — Medication 4 MILLILITER(S): at 00:29

## 2021-06-13 RX ADMIN — Medication 3 MILLILITER(S): at 00:24

## 2021-06-13 RX ADMIN — Medication 4 MILLIGRAM(S): at 18:24

## 2021-06-13 RX ADMIN — CARVEDILOL PHOSPHATE 25 MILLIGRAM(S): 80 CAPSULE, EXTENDED RELEASE ORAL at 06:01

## 2021-06-13 RX ADMIN — OXYCODONE HYDROCHLORIDE 10 MILLIGRAM(S): 5 TABLET ORAL at 00:25

## 2021-06-13 RX ADMIN — PANTOPRAZOLE SODIUM 40 MILLIGRAM(S): 20 TABLET, DELAYED RELEASE ORAL at 06:03

## 2021-06-13 RX ADMIN — Medication 4 MILLIGRAM(S): at 11:40

## 2021-06-13 RX ADMIN — SODIUM CHLORIDE 3 MILLILITER(S): 9 INJECTION INTRAMUSCULAR; INTRAVENOUS; SUBCUTANEOUS at 11:41

## 2021-06-13 RX ADMIN — Medication 100 MILLIEQUIVALENT(S): at 06:01

## 2021-06-13 RX ADMIN — Medication 4 MILLIGRAM(S): at 17:20

## 2021-06-13 NOTE — PROGRESS NOTE ADULT - SUBJECTIVE AND OBJECTIVE BOX
CTICU  CRITICAL  CARE  attending     Hand off received 					   Pertinent clinical, laboratory, radiographic, hemodynamic, echocardiographic, respiratory data, microbiologic data and chart were reviewed and analyzed frequently throughout the course of the day and night  Patient seen and examined with CTS/ SH attending at bedside    Pt is a 61y , Male, post op day # 6 s/p AVR; replacement of aortic root; ascending aorta and maite arch replacement; PFO closure; EF 40%;    post op course c/b    RTOR for bleeding    post op    remains iextubated  Epinephrine weaned off  diuresed  s/p bedside bronchoscopy to clear secretions ( pt with active smoking Hx)  atrial fibrillation; rate controlled   s/p amiodarone 150 mg bolus; ongoing amiodarone loading  diuresed    today (06/13)    Mobitz 1 with LBBB  QRS>190 msecs  QTC (c) >500 msecs  Amiodarone and BB continued  Wires checked; threshold 3 mA  acute post H'gic anemia    HPI:    61 year old male, ex-smoker (quit 2 months ago), with a past medical history of hypertension, hyperlipidemia, CKD stage 3, TIA (2007), CAD s/p NSTEMI (Jan 2019), drug and alcohol misuse (cocaine/marijuana 30 yr hx), recent hospitalization for pneumonia and heart failure exacerbation, presents for evaluation and management of his valvular disease, dilated aorta, and coronary artery disease. NYHA Class II-III. He is referred by Dr. Gerry Rushing.         , FAMILY HISTORY:  FH: hypertension (Mother, Sibling)    PAST MEDICAL & SURGICAL HISTORY:  Hypertension    Hyperlipidemia    NSTEMI (non-ST elevation myocardial infarction)    CKD (chronic kidney disease)    TIA (transient ischemic attack)    CAD (coronary artery disease)    Aortic regurgitation    Thoracic aortic aneurysm    Aortic insufficiency    Mitral regurgitation    Aortic aneurysm    No significant past surgical history      Patient is a 61y old  Male who presents with a chief complaint of aortic regurgitation, mitral regurgitation, thoracic aortic aneurysm, and coronary artery disease (12 Jun 2021 23:15)      14 system review limited by post op status  acute changes include acute respiratory failure  Vital signs, hemodynamic and respiratory parameters were reviewed from the bedside nursing flowsheet.  ICU Vital Signs Last 24 Hrs  T(C): 36.3 (13 Jun 2021 20:51), Max: 36.8 (13 Jun 2021 17:07)  T(F): 97.3 (13 Jun 2021 20:51), Max: 98.2 (13 Jun 2021 17:07)  HR: 80 (14 Jun 2021 00:00) (64 - 85)  BP: 135/72 (13 Jun 2021 23:00) (113/70 - 147/70)  BP(mean): 97 (13 Jun 2021 23:00) (82 - 111)  ABP: 97/63 (13 Jun 2021 10:05) (93/77 - 144/80)  ABP(mean): 74 (13 Jun 2021 10:05) (72 - 102)  RR: 20 (14 Jun 2021 00:00) (14 - 26)  SpO2: 97% (14 Jun 2021 00:00) (94% - 100%)    Adult Advanced Hemodynamics Last 24 Hrs  CVP(mm Hg): --  CVP(cm H2O): --  CO: --  CI: --  PA: --  PA(mean): --  PCWP: --  SVR: --  SVRI: --  PVR: --  PVRI: --, ABG - ( 13 Jun 2021 03:26 )  pH, Arterial: 7.49  pH, Blood: x     /  pCO2: 30    /  pO2: 105   / HCO3: 23    / Base Excess: 0.4   /  SaO2: 100.0               Intake and output was reviewed and the fluid balance was calculated  Daily     Daily   I&O's Summary    12 Jun 2021 07:01  -  13 Jun 2021 07:00  --------------------------------------------------------  IN: 890 mL / OUT: 665 mL / NET: 225 mL    13 Jun 2021 07:01  -  14 Jun 2021 01:04  --------------------------------------------------------  IN: 980 mL / OUT: 1125 mL / NET: -145 mL        All lines and drain sites were assessed  Glycemic trend was reviewedCAPILLARY BLOOD GLUCOSE        No acute change in mental status  Auscultation of the chest reveals equal bs  Abdomen is soft  Extremities are warm and well perfused  Wounds appear clean and unremarkable  Antibiotics are periop    labs  CBC Full  -  ( 13 Jun 2021 03:30 )  WBC Count : 9.10 K/uL  RBC Count : 3.08 M/uL  Hemoglobin : 8.8 g/dL  Hematocrit : 26.6 %  Platelet Count - Automated : 182 K/uL  Mean Cell Volume : 86.4 fl  Mean Cell Hemoglobin : 28.6 pg  Mean Cell Hemoglobin Concentration : 33.1 gm/dL  Auto Neutrophil # : x  Auto Lymphocyte # : x  Auto Monocyte # : x  Auto Eosinophil # : x  Auto Basophil # : x  Auto Neutrophil % : x  Auto Lymphocyte % : x  Auto Monocyte % : x  Auto Eosinophil % : x  Auto Basophil % : x    06-13    x   |  x   |  x   ----------------------------<  x   4.1   |  x   |  x     Ca    8.4      13 Jun 2021 03:28  Phos  3.2     06-13  Mg     2.0     06-13      PT/INR - ( 12 Jun 2021 10:38 )   PT: 12.7 sec;   INR: 1.06          PTT - ( 12 Jun 2021 10:38 )  PTT:26.4 sec  The current medications were reviewed   MEDICATIONS  (STANDING):  aMIOdarone    Tablet 400 milliGRAM(s) Oral two times a day  aspirin enteric coated 81 milliGRAM(s) Oral daily  atorvastatin 40 milliGRAM(s) Oral at bedtime  carvedilol 25 milliGRAM(s) Oral every 12 hours  chlorhexidine 2% Cloths 1 Application(s) Topical <User Schedule>  heparin   Injectable 5000 Unit(s) SubCutaneous every 8 hours  pantoprazole    Tablet 40 milliGRAM(s) Oral before breakfast  piperacillin/tazobactam IVPB.. 3.375 Gram(s) IV Intermittent every 6 hours  polyethylene glycol 3350 17 Gram(s) Oral daily  sertraline 25 milliGRAM(s) Oral daily  sodium chloride 0.9%. 1000 milliLiter(s) (10 mL/Hr) IV Continuous <Continuous>  sodium chloride 3%  Inhalation 3 milliLiter(s) Inhalation every 6 hours  testosterone patch 4 mG/24 Hr(s) 4 milliGRAM(s) Transdermal daily    MEDICATIONS  (PRN):  acetaminophen   Tablet .. 650 milliGRAM(s) Oral every 6 hours PRN Mild Pain (1 - 3)  oxyCODONE    IR 5 milliGRAM(s) Oral every 4 hours PRN Moderate Pain (4 - 6)  oxyCODONE    IR 10 milliGRAM(s) Oral every 6 hours PRN Severe Pain (7 - 10)       PROBLEM LIST/ ASSESSMENT:  HEALTH ISSUES - PROBLEM Dx:      ,   Patient is a 61y old  Male who presents with a chief complaint of aortic regurgitation, mitral regurgitation, thoracic aortic aneurysm, and coronary artery disease (12 Jun 2021 23:15)     s/p  AVR; replacement of aortic root; ascending aorta and maite arch replacement; PFO closure; EF 40%;    acute changes include acute respiratory failure    My plan includes :    continue Amiodarone and beta blockers (per EP)  check 12 lead EKG bid until BBB resolves  Epicardial PM on back up @ VVI 40bpm  Replete lytes; maintain K>4.5; Mg > 2.0  Maintain MAP >80 (hx of TIA and CKD)    close hemodynamic, ventilatory and drain monitoring and management per post op routine    Monitor for arrhythmias and monitor parameters for organ perfusion  monitor neurologic status  Head of the bed should remain elevated to 45 deg .   chest PT and IS will be encouraged  monitor adequacy of oxygenation and ventilation and attempt to wean oxygen  Nutritional goals will be met using po eventually , ensure adequate caloric intake and montior the same  Stress ulcer and VTE prophylaxis will be achieved    Glycemic control is satisfactory  Electrolytes have been repleted as necessary and wound care has been carried out. Pain control has been achieved.   agressive physical therapy and early mobility and ambulation goals will be met   The family was updated about the course and plan  CRITICAL CARE TIME SPENT in evaluation and management, reassessments, review and interpretation of labs and x-rays, ventilator and hemodynamic management, formulating a plan and coordinating care: ___90____ MIN.  Time does not include procedural time.  CTICU ATTENDING     					    Compa Donahue MD

## 2021-06-14 LAB
ALBUMIN SERPL ELPH-MCNC: 3.1 G/DL — LOW (ref 3.3–5)
ALP SERPL-CCNC: 85 U/L — SIGNIFICANT CHANGE UP (ref 40–120)
ALT FLD-CCNC: 28 U/L — SIGNIFICANT CHANGE UP (ref 10–45)
ANION GAP SERPL CALC-SCNC: 12 MMOL/L — SIGNIFICANT CHANGE UP (ref 5–17)
ANION GAP SERPL CALC-SCNC: 9 MMOL/L — SIGNIFICANT CHANGE UP (ref 5–17)
AST SERPL-CCNC: 18 U/L — SIGNIFICANT CHANGE UP (ref 10–40)
BILIRUB SERPL-MCNC: 0.6 MG/DL — SIGNIFICANT CHANGE UP (ref 0.2–1.2)
BUN SERPL-MCNC: 27 MG/DL — HIGH (ref 7–23)
BUN SERPL-MCNC: 27 MG/DL — HIGH (ref 7–23)
CALCIUM SERPL-MCNC: 8.3 MG/DL — LOW (ref 8.4–10.5)
CALCIUM SERPL-MCNC: 8.6 MG/DL — SIGNIFICANT CHANGE UP (ref 8.4–10.5)
CHLORIDE SERPL-SCNC: 102 MMOL/L — SIGNIFICANT CHANGE UP (ref 96–108)
CHLORIDE SERPL-SCNC: 102 MMOL/L — SIGNIFICANT CHANGE UP (ref 96–108)
CO2 SERPL-SCNC: 23 MMOL/L — SIGNIFICANT CHANGE UP (ref 22–31)
CO2 SERPL-SCNC: 25 MMOL/L — SIGNIFICANT CHANGE UP (ref 22–31)
CREAT SERPL-MCNC: 1.03 MG/DL — SIGNIFICANT CHANGE UP (ref 0.5–1.3)
CREAT SERPL-MCNC: 1.25 MG/DL — SIGNIFICANT CHANGE UP (ref 0.5–1.3)
CULTURE RESULTS: SIGNIFICANT CHANGE UP
GLUCOSE SERPL-MCNC: 116 MG/DL — HIGH (ref 70–99)
GLUCOSE SERPL-MCNC: 167 MG/DL — HIGH (ref 70–99)
HCT VFR BLD CALC: 27.9 % — LOW (ref 39–50)
HCT VFR BLD CALC: 29.3 % — LOW (ref 39–50)
HGB BLD-MCNC: 9.3 G/DL — LOW (ref 13–17)
HGB BLD-MCNC: 9.4 G/DL — LOW (ref 13–17)
MAGNESIUM SERPL-MCNC: 2.1 MG/DL — SIGNIFICANT CHANGE UP (ref 1.6–2.6)
MAGNESIUM SERPL-MCNC: 2.3 MG/DL — SIGNIFICANT CHANGE UP (ref 1.6–2.6)
MCHC RBC-ENTMCNC: 28.1 PG — SIGNIFICANT CHANGE UP (ref 27–34)
MCHC RBC-ENTMCNC: 29.2 PG — SIGNIFICANT CHANGE UP (ref 27–34)
MCHC RBC-ENTMCNC: 32.1 GM/DL — SIGNIFICANT CHANGE UP (ref 32–36)
MCHC RBC-ENTMCNC: 33.3 GM/DL — SIGNIFICANT CHANGE UP (ref 32–36)
MCV RBC AUTO: 87.5 FL — SIGNIFICANT CHANGE UP (ref 80–100)
MCV RBC AUTO: 87.7 FL — SIGNIFICANT CHANGE UP (ref 80–100)
NRBC # BLD: 0 /100 WBCS — SIGNIFICANT CHANGE UP (ref 0–0)
NRBC # BLD: 0 /100 WBCS — SIGNIFICANT CHANGE UP (ref 0–0)
PHOSPHATE SERPL-MCNC: 2.9 MG/DL — SIGNIFICANT CHANGE UP (ref 2.5–4.5)
PLATELET # BLD AUTO: 217 K/UL — SIGNIFICANT CHANGE UP (ref 150–400)
PLATELET # BLD AUTO: 235 K/UL — SIGNIFICANT CHANGE UP (ref 150–400)
POTASSIUM SERPL-MCNC: 3.9 MMOL/L — SIGNIFICANT CHANGE UP (ref 3.5–5.3)
POTASSIUM SERPL-MCNC: 4 MMOL/L — SIGNIFICANT CHANGE UP (ref 3.5–5.3)
POTASSIUM SERPL-SCNC: 3.9 MMOL/L — SIGNIFICANT CHANGE UP (ref 3.5–5.3)
POTASSIUM SERPL-SCNC: 4 MMOL/L — SIGNIFICANT CHANGE UP (ref 3.5–5.3)
PROT SERPL-MCNC: 6.3 G/DL — SIGNIFICANT CHANGE UP (ref 6–8.3)
RBC # BLD: 3.19 M/UL — LOW (ref 4.2–5.8)
RBC # BLD: 3.34 M/UL — LOW (ref 4.2–5.8)
RBC # FLD: 15.4 % — HIGH (ref 10.3–14.5)
RBC # FLD: 15.6 % — HIGH (ref 10.3–14.5)
SODIUM SERPL-SCNC: 136 MMOL/L — SIGNIFICANT CHANGE UP (ref 135–145)
SODIUM SERPL-SCNC: 137 MMOL/L — SIGNIFICANT CHANGE UP (ref 135–145)
SPECIMEN SOURCE: SIGNIFICANT CHANGE UP
WBC # BLD: 11.43 K/UL — HIGH (ref 3.8–10.5)
WBC # BLD: 11.58 K/UL — HIGH (ref 3.8–10.5)
WBC # FLD AUTO: 11.43 K/UL — HIGH (ref 3.8–10.5)
WBC # FLD AUTO: 11.58 K/UL — HIGH (ref 3.8–10.5)

## 2021-06-14 PROCEDURE — 93010 ELECTROCARDIOGRAM REPORT: CPT

## 2021-06-14 PROCEDURE — 71045 X-RAY EXAM CHEST 1 VIEW: CPT | Mod: 26

## 2021-06-14 PROCEDURE — 99232 SBSQ HOSP IP/OBS MODERATE 35: CPT

## 2021-06-14 PROCEDURE — 99292 CRITICAL CARE ADDL 30 MIN: CPT

## 2021-06-14 PROCEDURE — 99223 1ST HOSP IP/OBS HIGH 75: CPT

## 2021-06-14 RX ORDER — LEVALBUTEROL 1.25 MG/.5ML
0.63 SOLUTION, CONCENTRATE RESPIRATORY (INHALATION) EVERY 6 HOURS
Refills: 0 | Status: DISCONTINUED | OUTPATIENT
Start: 2021-06-14 | End: 2021-06-18

## 2021-06-14 RX ORDER — POTASSIUM CHLORIDE 20 MEQ
20 PACKET (EA) ORAL ONCE
Refills: 0 | Status: COMPLETED | OUTPATIENT
Start: 2021-06-14 | End: 2021-06-14

## 2021-06-14 RX ORDER — OXYCODONE HYDROCHLORIDE 5 MG/1
5 TABLET ORAL EVERY 4 HOURS
Refills: 0 | Status: DISCONTINUED | OUTPATIENT
Start: 2021-06-14 | End: 2021-06-18

## 2021-06-14 RX ORDER — IPRATROPIUM/ALBUTEROL SULFATE 18-103MCG
3 AEROSOL WITH ADAPTER (GRAM) INHALATION ONCE
Refills: 0 | Status: COMPLETED | OUTPATIENT
Start: 2021-06-14 | End: 2021-06-14

## 2021-06-14 RX ORDER — FUROSEMIDE 40 MG
40 TABLET ORAL ONCE
Refills: 0 | Status: COMPLETED | OUTPATIENT
Start: 2021-06-14 | End: 2021-06-14

## 2021-06-14 RX ADMIN — SODIUM CHLORIDE 3 MILLILITER(S): 9 INJECTION INTRAMUSCULAR; INTRAVENOUS; SUBCUTANEOUS at 17:35

## 2021-06-14 RX ADMIN — Medication 81 MILLIGRAM(S): at 13:07

## 2021-06-14 RX ADMIN — Medication 20 MILLIEQUIVALENT(S): at 05:17

## 2021-06-14 RX ADMIN — Medication 4 MILLIGRAM(S): at 07:12

## 2021-06-14 RX ADMIN — POLYETHYLENE GLYCOL 3350 17 GRAM(S): 17 POWDER, FOR SOLUTION ORAL at 13:07

## 2021-06-14 RX ADMIN — Medication 10 MILLIGRAM(S): at 15:31

## 2021-06-14 RX ADMIN — SODIUM CHLORIDE 3 MILLILITER(S): 9 INJECTION INTRAMUSCULAR; INTRAVENOUS; SUBCUTANEOUS at 12:21

## 2021-06-14 RX ADMIN — HEPARIN SODIUM 5000 UNIT(S): 5000 INJECTION INTRAVENOUS; SUBCUTANEOUS at 21:20

## 2021-06-14 RX ADMIN — PANTOPRAZOLE SODIUM 40 MILLIGRAM(S): 20 TABLET, DELAYED RELEASE ORAL at 07:19

## 2021-06-14 RX ADMIN — Medication 4 MILLIGRAM(S): at 11:09

## 2021-06-14 RX ADMIN — Medication 40 MILLIGRAM(S): at 11:07

## 2021-06-14 RX ADMIN — HEPARIN SODIUM 5000 UNIT(S): 5000 INJECTION INTRAVENOUS; SUBCUTANEOUS at 05:40

## 2021-06-14 RX ADMIN — HEPARIN SODIUM 5000 UNIT(S): 5000 INJECTION INTRAVENOUS; SUBCUTANEOUS at 13:08

## 2021-06-14 RX ADMIN — OXYCODONE HYDROCHLORIDE 5 MILLIGRAM(S): 5 TABLET ORAL at 05:47

## 2021-06-14 RX ADMIN — AMIODARONE HYDROCHLORIDE 400 MILLIGRAM(S): 400 TABLET ORAL at 17:39

## 2021-06-14 RX ADMIN — OXYCODONE HYDROCHLORIDE 5 MILLIGRAM(S): 5 TABLET ORAL at 06:40

## 2021-06-14 RX ADMIN — PIPERACILLIN AND TAZOBACTAM 200 GRAM(S): 4; .5 INJECTION, POWDER, LYOPHILIZED, FOR SOLUTION INTRAVENOUS at 03:00

## 2021-06-14 RX ADMIN — SERTRALINE 25 MILLIGRAM(S): 25 TABLET, FILM COATED ORAL at 13:07

## 2021-06-14 RX ADMIN — SODIUM CHLORIDE 3 MILLILITER(S): 9 INJECTION INTRAMUSCULAR; INTRAVENOUS; SUBCUTANEOUS at 00:15

## 2021-06-14 RX ADMIN — Medication 3 MILLILITER(S): at 10:08

## 2021-06-14 RX ADMIN — PIPERACILLIN AND TAZOBACTAM 200 GRAM(S): 4; .5 INJECTION, POWDER, LYOPHILIZED, FOR SOLUTION INTRAVENOUS at 09:16

## 2021-06-14 RX ADMIN — Medication 4 MILLIGRAM(S): at 17:37

## 2021-06-14 RX ADMIN — CARVEDILOL PHOSPHATE 25 MILLIGRAM(S): 80 CAPSULE, EXTENDED RELEASE ORAL at 05:40

## 2021-06-14 RX ADMIN — ATORVASTATIN CALCIUM 40 MILLIGRAM(S): 80 TABLET, FILM COATED ORAL at 21:20

## 2021-06-14 RX ADMIN — CHLORHEXIDINE GLUCONATE 1 APPLICATION(S): 213 SOLUTION TOPICAL at 05:41

## 2021-06-14 RX ADMIN — AMIODARONE HYDROCHLORIDE 400 MILLIGRAM(S): 400 TABLET ORAL at 05:40

## 2021-06-14 RX ADMIN — SODIUM CHLORIDE 3 MILLILITER(S): 9 INJECTION INTRAMUSCULAR; INTRAVENOUS; SUBCUTANEOUS at 06:05

## 2021-06-14 RX ADMIN — CARVEDILOL PHOSPHATE 25 MILLIGRAM(S): 80 CAPSULE, EXTENDED RELEASE ORAL at 17:39

## 2021-06-14 NOTE — PROGRESS NOTE ADULT - SUBJECTIVE AND OBJECTIVE BOX
INTERVAL HPI/OVERNIGHT EVENTS:    6/7: RTOR - Reexploration, mediastinum, sternotomy approach   6/7: Replacement of aortic root and ascending/hemiarch replacement. PFO closure   EF 40%  referring: Dr. Gerry Rushing.     62yo Male Hx tobacco abuse - (quit 2 mo ago), HTN, hyperlipidemia, CKD III, TIA (2007), CAD/MI, prior drug and alcohol misuse (cocaine/marijuana 30 yr hx), recent hospitalization for PNA/CHF exacerbation - w/u: dilated aorta, and coronary artery disease. Sxs SOB/MULLIGAN    Cath: severe AI. CTa imaging performed     MICAELA 5/3/21: Mildly reduced LV systolic function. Mild-mod reduced RV systolic function.   No LA/RA/MARIS/RAA thrombus seen. (+)PFO with primarily left to right shunting Mild-to-mod MR  Severe aortic regurgitation. The vena contracta width is 0.60 cm (severe >0.6 cm). There is diastolic flow reversal in the descending aorta supporting severe aortic regurgitation diagnosis.   The aortic root is moderately dilated. The aortic root measures 4.60 cm at level of the sinuses of  Valsalva (normal 3.1-3.7 cm for men, 2.7-3.3 cm for women). No pericardial effusion.     CTA Chest 5/12: aortic root 4.7cm. ascending aorta 4.5cm. incidental finding of distal left atrial appendage incomplete enhancement vs early thrombus formation.     To OR 6/7  Intraop: 2L Crystalloid/2 FFP/1 platelet/10 Cryo/1000 FEIBA  arrived to ICU on levophed     RTOR later same day (6/7) for bleeding/exploration - multiple diffuse small vessels bleeding.   Clot evacuated from R pleural space and washed out  given - 1.5 L Crystalloid/1 U pRBC and 1000 FEIBA  Arrived to ICU - Epi and Levophed     Bronch then extubated 6/8  nicardipine for BP control   6/10: post-op Fib/RVR - Amiodarone/Metoprolol      diuresis initiated ; bronch/pulm toilet and nocturnal NIV (nasal bipap being utilized)              PAST MEDICAL & SURGICAL HISTORY:  Hypertension    Hyperlipidemia    NSTEMI (non-ST elevation myocardial infarction)    CKD (chronic kidney disease)    TIA (transient ischemic attack)    CAD (coronary artery disease)    Aortic regurgitation    Thoracic aortic aneurysm    Aortic insufficiency    Mitral regurgitation    Aortic aneurysm    No significant past surgical history          ICU Vital Signs Last 24 Hrs  T(C): 36.7 (14 Jun 2021 05:05), Max: 36.8 (13 Jun 2021 17:07)  T(F): 98 (14 Jun 2021 05:05), Max: 98.2 (13 Jun 2021 17:07)  HR: 73 (14 Jun 2021 10:00) (64 - 92)  BP: 112/65 (14 Jun 2021 10:00) (100/52 - 154/73)  BP(mean): 83 (14 Jun 2021 10:00) (72 - 111)  ABP: --  ABP(mean): --  RR: 19 (14 Jun 2021 10:00) (14 - 26)  SpO2: 99% (14 Jun 2021 10:00) (95% - 100%)    Qtts:     I&O's Summary    13 Jun 2021 07:01  -  14 Jun 2021 07:00  --------------------------------------------------------  IN: 1260 mL / OUT: 1725 mL / NET: -465 mL    14 Jun 2021 07:01  -  14 Jun 2021 10:58  --------------------------------------------------------  IN: 220 mL / OUT: 0 mL / NET: 220 mL            Physical Exam    Heart  Lungs  Abd  Ext  Chest  Neuro  Skin    LABS:                        9.4    11.58 )-----------( 235      ( 14 Jun 2021 10:14 )             29.3     06-14    136  |  102  |  27<H>  ----------------------------<  167<H>  4.0   |  25  |  1.25    Ca    8.6      14 Jun 2021 10:14  Phos  2.9     06-14  Mg     2.3     06-14    TPro  6.3  /  Alb  3.1<L>  /  TBili  0.6  /  DBili  x   /  AST  18  /  ALT  28  /  AlkPhos  85  06-14        ABG - ( 13 Jun 2021 03:26 )  pH, Arterial: 7.49  pH, Blood: x     /  pCO2: 30    /  pO2: 105   / HCO3: 23    / Base Excess: 0.4   /  SaO2: 100.0               RADIOLOGY & ADDITIONAL STUDIES:    I have spent/provided stated minutes of critical care time to this patient:  INTERVAL HPI/OVERNIGHT EVENTS:    6/7: RTOR - Reexploration, mediastinum, sternotomy approach   6/7: Replacement of aortic root and ascending/hemiarch replacement. PFO closure   EF 40%  referring: Dr. Gerry Rushing.     60yo Male Hx tobacco abuse - (quit 2 mo ago), HTN, hyperlipidemia, CKD III, TIA (2007), CAD/MI, prior drug and alcohol misuse (cocaine/marijuana 30 yr hx), recent hospitalization for PNA/CHF exacerbation - w/u: dilated aorta, and coronary artery disease. Sxs SOB/MULLIGAN    Cath: severe AI. CTa imaging performed     MICAELA 5/3/21: Mildly reduced LV systolic function. Mild-mod reduced RV systolic function.   No LA/RA/MARIS/RAA thrombus seen. (+)PFO with primarily left to right shunting Mild-to-mod MR  Severe aortic regurgitation. The vena contracta width is 0.60 cm (severe >0.6 cm). There is diastolic flow reversal in the descending aorta supporting severe aortic regurgitation diagnosis.   The aortic root is moderately dilated. The aortic root measures 4.60 cm at level of the sinuses of  Valsalva (normal 3.1-3.7 cm for men, 2.7-3.3 cm for women). No pericardial effusion.     CTA Chest 5/12: aortic root 4.7cm. ascending aorta 4.5cm. incidental finding of distal left atrial appendage incomplete enhancement vs early thrombus formation.     To OR 6/7  Intraop: 2L Crystalloid/2 FFP/1 platelet/10 Cryo/1000 FEIBA  arrived to ICU on levophed     RTOR later same day (6/7) for bleeding/exploration - multiple diffuse small vessels bleeding.   Clot evacuated from R pleural space and washed out  given - 1.5 L Crystalloid/1 U pRBC and 1000 FEIBA  Arrived to ICU - Epi and Levophed     Bronch then extubated 6/8  nicardipine for BP control   6/10: post-op Fib/RVR - Amiodarone/Metoprolol      diuresis initiated ; bronch/pulm toilet and nocturnal NIV (nasal bipap being utilized)  Up and Ambulating with staff several times 6/13 by report    patient reports feeling fatigued and SOB this am - placed on HFNC and diuretic dosing given   No acute events reported overnight     PMHx includes but is not limited to:   Hypertension  Hyperlipidemia  CAD/NSTEMI (non-ST elevation myocardial infarction)  CKD (chronic kidney disease)  Aortic regurgitation/Aortic insufficiency  Thoracic aortic aneurysm  Mitral regurgitation  Aortic aneurysm    ICU Vital Signs Last 24 Hrs  T(C): 36.7 (14 Jun 2021 05:05), Max: 36.8 (13 Jun 2021 17:07)  T(F): 98 (14 Jun 2021 05:05), Max: 98.2 (13 Jun 2021 17:07)  HR: 73 (14 Jun 2021 10:00) (64 - 92) sinus   BP: 112/65 (14 Jun 2021 10:00) (100/52 - 154/73)  BP(mean): 83 (14 Jun 2021 10:00) (72 - 111)  RR: 19 (14 Jun 2021 10:00) (14 - 26)  SpO2: 99% (14 Jun 2021 10:00) (95% - 100%) RA - w/subjective sensation SOB - placed on HFNC     Qtts: None     I&O's Summary    13 Jun 2021 07:01  -  14 Jun 2021 07:00  --------------------------------------------------------  IN: 1260 mL / OUT: 1725 mL / NET: -465 mL    14 Jun 2021 07:01  -  14 Jun 2021 10:58  --------------------------------------------------------  IN: 220 mL / OUT: 0 mL / NET: 220 mL    Physical Exam    Heart - regular (-)rub/gallop  Lungs - poor inspiratory effort - no rhonchi/wheeze  Abd - (+)Bs Soft NTND (-)r/r/g  Ext - warm to touch; no cyanosis/clubbing   Neuro - alert/oriented and interactive - non-focal   Skin - no rash      LABS:                        9.4    11.58 )-----------( 235      ( 14 Jun 2021 10:14 )             29.3     06-14    136  |  102  |  27<H>  ----------------------------<  167<H>  4.0   |  25  |  1.25    Ca    8.6      14 Jun 2021 10:14  Phos  2.9     06-14  Mg     2.3     06-14    TPro  6.3  /  Alb  3.1<L>  /  TBili  0.6  /  DBili  x   /  AST  18  /  ALT  28  /  AlkPhos  85  06-14    ABG - ( 13 Jun 2021 03:26 ) 7.49/30/105/100    RADIOLOGY & ADDITIONAL STUDIES: reviewed     Patient s/p elective Replacement of aortic root and ascending/hemiarch replacement. PFO closure w/RTOR same juan carlos for Reexploration, mediastinum, sternotomy approach - slow but sustained progress in recovery post-op     1. CV  hemodynamically stable  sinus rhythm   ASA/statin   Amiodarone po load ongoing   Coreg 25 BID     2. Pulm   subjective sensation of SOB - Sa02 good  HFNC for patient comfort - diuretic dosing given with favorable response   anticipate reattempt NC and ambulation later today  presumptive zosyn started now D#3 ; sputum Cx 6/12 - normal adali . d/c zosyn  incentive spirometry/ambulation with staff assist  start nebs   Hx COPD and smoking Hx     maintain glycemic control - serum 167 ; HgA1c 6  Bowel regimen - last BM 6/9    DVT and GI prophylaxis     d/w patient/staff and CTS    I have spent/provided stated minutes of critical care time to this patient: 90

## 2021-06-14 NOTE — PROGRESS NOTE ADULT - SUBJECTIVE AND OBJECTIVE BOX
CTICU  CRITICAL  CARE  attending     Hand off received 					   Pertinent clinical, laboratory, radiographic, hemodynamic, echocardiographic, respiratory data, microbiologic data and chart were reviewed and analyzed frequently throughout the course of the day and night      · Subjective and Objective:   CTICU  CRITICAL  CARE  attending     Hand off received 					   Pertinent clinical, laboratory, radiographic, hemodynamic, echocardiographic, respiratory data, microbiologic data and chart were reviewed and analyzed frequently throughout the course of the day and night      61 year old male with  hypertension, hyperlipidemia, CKD stage 3, TIA (2007), CAD s/p NSTEMI (Jan 2019), drug and alcohol misuse (cocaine/marijuana 30 yr hx).  He was recently  hospitalized for pneumonia and heart failure exacerbation, presents for evaluation and management of his valvular disease, dilated aorta, and coronary artery disease.   The Patient was [facility] for [facility] and worsening dyspnea on exertion. Since discharge, he states that he is feeling better. He admits to worsening fatigue and lightheaded over the past couple of months with activities. He is able to ambulate 5 blocks prior to onset of symptoms.  Cardiac workup which revealed severe AI.   Cardiac Cath 4/15/21:   Left main: angiographically normal   LAD: significant ectasia noted   Distal LAD: moderate diffuse disease   Circumflex: Significant ectasia noted   Distal Circ: severe diffuse disease   RCA: Significant ectasia noted.     MICAELA 5/3/21:    1. Mildly reduced left ventricular systolic function.    2. Normal right ventricular size.    3. Xmepza-lw-nembmddmqf reduced right ventricular systolic function.    4. No LA/RA/MARIS/RAA thrombus seen.    5. Color flow Doppler reveals evidence of a patent foramen ovale with primarily left to right shunting by   color doppler.    6. Mild-to-moderate mitral regurgitation.    7. Fibrocalcific tricuspid aortic valve without significant stenosis. The aortic valve leaflets do not coapt.   There is severe aortic regurgitation. The vena contracta width is 0.60 cm (severe >0.6 cm). There is diastolic flow reversal in the descending aorta supporting severe aortic regurgitation diagnosis.    8. The aortic root is moderately dilated. The aortic root measures 4.60 cm at level of the sinuses of  Valsalva (normal 3.1-3.7 cm for men, 2.7-3.3 cm for women).    9. No pericardial effusion.     CTA chest 5/12/21: aortic root 4.7cm. ascending aorta 4.5cm. incidental finding of distal left atrial appendage incomplete enhancement vs early thrombus formation.     The patient was evaluated by CT surgery and is deemed a surgical candidate for aortic valve replacement, mitral valve repair/placement, possible coronary artery bypass grafting, aortic root and ascending aorta replacement.    He underwent aortic root replacement and replacement of ascending aorta and hemiarch.  He was reexplored in the operating room for bleeding.          FAMILY HISTORY:  FH: hypertension (Mother, Sibling)    PAST MEDICAL & SURGICAL HISTORY:  Hypertension  Hyperlipidemia  NSTEMI (non-ST elevation myocardial infarction)  CKD (chronic kidney disease)  TIA (transient ischemic attack)  CAD (coronary artery disease)  Aortic regurgitation  Thoracic aortic aneurysm  Aortic insufficiency  Mitral regurgitation  Aortic aneurysm  No significant past surgical history          14 system review was unremarkable    Vital signs, hemodynamic and respiratory parameters were reviewed from the bedside nursing flow sheet.  ICU Vital Signs Last 24 Hrs  T(C): 36.4 (14 Jun 2021 17:29), Max: 36.9 (14 Jun 2021 14:00)  T(F): 97.5 (14 Jun 2021 17:29), Max: 98.5 (14 Jun 2021 14:00)  HR: 68 (14 Jun 2021 21:00) (66 - 92)  BP: 140/78 (14 Jun 2021 21:00) (100/52 - 154/73)  BP(mean): 104 (14 Jun 2021 21:00) (72 - 109)  ABP: --  ABP(mean): --  RR: 23 (14 Jun 2021 21:00) (15 - 26)  SpO2: 96% (14 Jun 2021 21:00) (95% - 100%)    Adult Advanced Hemodynamics Last 24 Hrs  CVP(mm Hg): --  CVP(cm H2O): --  CO: --  CI: --  PA: --  PA(mean): --  PCWP: --  SVR: --  SVRI: --  PVR: --  PVRI: --, ABG - ( 13 Jun 2021 03:26 )  pH, Arterial: 7.49  pH, Blood: x     /  pCO2: 30    /  pO2: 105   / HCO3: 23    / Base Excess: 0.4   /  SaO2: 100.0               Intake and output was reviewed and the fluid balance was calculated  Daily     Daily   I&O's Summary    13 Jun 2021 07:01  -  14 Jun 2021 07:00  --------------------------------------------------------  IN: 1260 mL / OUT: 1725 mL / NET: -465 mL    14 Jun 2021 07:01  -  14 Jun 2021 21:33  --------------------------------------------------------  IN: 460 mL / OUT: 1325 mL / NET: -865 mL        All lines and drain sites were assessed      Neuro: No change in the mental status from the baseline. Follows commands. Moves all 4 extremities.  Neck: No JVD.  CVS: S1, S2, No S3.  Lungs: Good air entry bilaterally.  Abd: Soft. No tenderness. + Bowel sounds.  Vascular: + DP/PT.  Extremities: No edema.  Lymphatic: Normal.  Skin: No abnormalities.      labs  CBC Full  -  ( 14 Jun 2021 10:14 )  WBC Count : 11.58 K/uL  RBC Count : 3.34 M/uL  Hemoglobin : 9.4 g/dL  Hematocrit : 29.3 %  Platelet Count - Automated : 235 K/uL  Mean Cell Volume : 87.7 fl  Mean Cell Hemoglobin : 28.1 pg  Mean Cell Hemoglobin Concentration : 32.1 gm/dL  Auto Neutrophil # : x  Auto Lymphocyte # : x  Auto Monocyte # : x  Auto Eosinophil # : x  Auto Basophil # : x  Auto Neutrophil % : x  Auto Lymphocyte % : x  Auto Monocyte % : x  Auto Eosinophil % : x  Auto Basophil % : x    06-14    136  |  102  |  27<H>  ----------------------------<  167<H>  4.0   |  25  |  1.25    Ca    8.6      14 Jun 2021 10:14  Phos  2.9     06-14  Mg     2.3     06-14    TPro  6.3  /  Alb  3.1<L>  /  TBili  0.6  /  DBili  x   /  AST  18  /  ALT  28  /  AlkPhos  85  06-14      The current medications were reviewed   MEDICATIONS  (STANDING):  aspirin enteric coated 81 milliGRAM(s) Oral daily  atorvastatin 40 milliGRAM(s) Oral at bedtime  carvedilol 25 milliGRAM(s) Oral every 12 hours  chlorhexidine 2% Cloths 1 Application(s) Topical <User Schedule>  heparin   Injectable 5000 Unit(s) SubCutaneous every 8 hours  pantoprazole    Tablet 40 milliGRAM(s) Oral before breakfast  polyethylene glycol 3350 17 Gram(s) Oral daily  sertraline 25 milliGRAM(s) Oral daily  sodium chloride 0.9%. 1000 milliLiter(s) (10 mL/Hr) IV Continuous <Continuous>  sodium chloride 3%  Inhalation 3 milliLiter(s) Inhalation every 6 hours  testosterone patch 4 mG/24 Hr(s) 4 milliGRAM(s) Transdermal daily    MEDICATIONS  (PRN):  acetaminophen   Tablet .. 650 milliGRAM(s) Oral every 6 hours PRN Mild Pain (1 - 3)  bisacodyl Suppository 10 milliGRAM(s) Rectal daily PRN Constipation  levalbuterol Inhalation 0.63 milliGRAM(s) Inhalation every 6 hours PRN Shortness of breath or wheeze  oxyCODONE    IR 10 milliGRAM(s) Oral every 6 hours PRN Severe Pain (7 - 10)  oxyCODONE    IR 5 milliGRAM(s) Oral every 4 hours PRN Moderate Pain (4 - 6)            61y old  Male who presents with aortic regurgitation, mitral regurgitation, thoracic aortic aneurysm, and coronary artery disease. (06/07/2021)  S/P aortic root replacement.  S/P replacement of ascending aorta and hemiarch.  S/P PFO closure.  Intraoperatively : 2L Crystalloid/2 FFP/1 platelet/10 Cryo/1000 FEIBA were given.  Arrived to ICU on levophed   Re explored in the operating room later for bleeding -> Multiple diffuse small vessels bleeding identified and cauterized.    Clot evacuated from Right pleural space and mediastinal washed out performed. He was given - 1.5 L Crystalloid/1 U pRBC and 1000 FEIBA  Arrived to ICU - Epi and Levophed   Bronchoscopy performed 06/08/2021.  Extubated 6/8  Post-op atrial Fibrillation with RVR treated with Amiodarone and Metoprolol  Hemodynamically stable.  Good oxygenation.  Fair urine out put.        My plan includes :  Statin and Betablocker.  Close hemodynamic, ventilatory and drain monitoring and management  Monitor for arrhythmias and monitor parameters for organ perfusion  Monitor neurologic status  Monitor renal function.  Head of the bed should remain elevated to 45 deg .   Chest PT and IS will be encouraged  Monitor adequacy of oxygenation and ventilation and attempt to wean oxygen  Nutritional goals will be met using po eventually , ensure adequate caloric intake and monitor the same  Stress ulcer and VTE prophylaxis will be achieved    OPTIMIZE Glycemic control.  Electrolytes have been repleted as necessary and wound care has been carried out. Pain control has been achieved.   Aggressive physical therapy and early mobility and ambulation goals will be met   The family was updated about the course and plan  CRITICAL CARE TIME SPENT in evaluation and management, reassessments, review and interpretation of labs and x-rays, ventilator and hemodynamic management, formulating a plan and coordinating care: ___30____ MIN.  Time does not include procedural time.  CTICU ATTENDING     					    Mynor Cottrell MD

## 2021-06-14 NOTE — CONSULT NOTE ADULT - ASSESSMENT
60 y/o M, h/o HTN, CKD, TIA 2007, NSTEMI (1/2019), h/o drug and alcohol misuse (cocaine/marijuana 30 yr hx), symptomatic severe AI and aortic aneurysm. s/p elective replacement of aortic root and ascending/hemiarch replacement and PFO closure on 6/7/21.  EF 40% intraop.   Noted to have post-op pAFib/RVR on 6/10 and was started on Amiodarone/Metoprolol by team.  Today telemetry has been showing sinus rhythm with PAC.    - EKG from outpatient in May showed a baseline RBBB and LAFB. EKG today no worsening of already abnormal conduction disease (ie, no CHB / pauses).  He has sinus rhythm with PAC.  QTC is prolonged in setting of RBBB.  Since LVEF is 40-45%, we are ok with using beta-blocker.  Recommend short term use of Amiodarone for postop paroxysmal AFIB.   - Case d/w Dr. Chandra.

## 2021-06-14 NOTE — CONSULT NOTE ADULT - SUBJECTIVE AND OBJECTIVE BOX
Electrophysiology Consult Note:     CHIEF COMPLAINT:  Patient is a 61y old  Male who presents with a chief complaint of aortic regurgitation, mitral regurgitation, thoracic aortic aneurysm, and coronary artery disease (14 Jun 2021 10:58)        HISTORY OF PRESENT ILLNESS:   HPI:  61 year old male, ex-smoker (quit 2 months ago), with a past medical history of hypertension, hyperlipidemia, CKD stage 3, TIA (2007), CAD s/p NSTEMI (Jan 2019), drug and alcohol misuse (cocaine/marijuana 30 yr hx), recent hospitalization for pneumonia and heart failure exacerbation, presents for evaluation and management of his valvular disease, dilated aorta, and coronary artery disease. NYHA Class II-III. He is referred by Dr. Gerry Rushing.     Patient was recently hospitalized at [facility] for pneumonia and worsening dyspnea on exertion. Since discharge, he states that he is feeling better. He admits to worsening fatigue and lightheaded over the past couple of months with activities. He is able to ambulate 5 blocks prior to onset of symptoms.    Patient underwent cardiac workup which revealed severe AI. He was evaluated by Dr. Treviño on 5/3/21 and was recommended to undergo a cta chest to evaluate thoracic aortic aneurysm. Patient returns today for surgical discussion.     Cardiac Cath 4/15/21:   Left main: angiographically normal   LAD: significant ectasia noted   Distal LAD: moderate diffuse disease   Circumflex: Significant ectasia noted   Distal Circ: severe diffuse disease   RCA: Significant ectasia noted.     MICAELA 5/3/21:    1. Mildly reduced left ventricular systolic function.    2. Normal right ventricular size.    3. Tluulg-fk-dthpcvpebh reduced right ventricular systolic function.    4. No LA/RA/MARIS/RAA thrombus seen.    5. Color flow Doppler reveals evidence of a patent foramen ovale with primarily left to right shunting by   color doppler.    6. Mild-to-moderate mitral regurgitation.    7. Fibrocalcific tricuspid aortic valve without significant stenosis. The aortic valve leaflets do not coapt.   There is severe aortic regurgitation. The vena contracta width is 0.60 cm (severe >0.6 cm). There is diastolic flow reversal in the descending aorta supporting severe aortic regurgitation diagnosis.    8. The aortic root is moderately dilated. The aortic root measures 4.60 cm at level of the sinuses of  Valsalva (normal 3.1-3.7 cm for men, 2.7-3.3 cm for women).    9. No pericardial effusion.     CTA chest 5/12/21: aortic root 4.7cm. ascending aorta 4.5cm. incidental finding of distal left atrial appendage incomplete enhancement vs early thrombus formation.     The patient was evaluated by CT surgery and is deemed a surgical candidate for aortic valve replacement, mitral valve repair/placement, possible coronary artery bypass grafting, aortic root and ascending aorta replacement.    Patient seen in same day holding area; Reports no changes to PMHx or medications since last seen by our team. Denies acute or current SOB, chest pain, palpitation, N/V/D, fever/chills, recent illness, or any other concerning symptoms. Pt reports nothing to eat or drink since last night. Pt last took his beta blocker this morning  (03 Jun 2021 13:53)        PAST MEDICAL & SURGICAL HISTORY:  Hypertension    Hyperlipidemia    NSTEMI (non-ST elevation myocardial infarction)    CKD (chronic kidney disease)    TIA (transient ischemic attack)    CAD (coronary artery disease)    Aortic regurgitation    Thoracic aortic aneurysm    Aortic insufficiency    Mitral regurgitation    Aortic aneurysm    No significant past surgical history        FAMILY HISTORY:  FH: hypertension (Mother, Sibling)        SOCIAL HISTORY:    [ ] Non-smoker  [ ] Smoker  [ ] Alcohol  [ ] illicit drugs    Allergies    No Known Allergies    Intolerances    	    MEDICATIONS:  MEDICATIONS  (STANDING):  aMIOdarone    Tablet 400 milliGRAM(s) Oral two times a day  aspirin enteric coated 81 milliGRAM(s) Oral daily  atorvastatin 40 milliGRAM(s) Oral at bedtime  carvedilol 25 milliGRAM(s) Oral every 12 hours  chlorhexidine 2% Cloths 1 Application(s) Topical <User Schedule>  heparin   Injectable 5000 Unit(s) SubCutaneous every 8 hours  pantoprazole    Tablet 40 milliGRAM(s) Oral before breakfast  polyethylene glycol 3350 17 Gram(s) Oral daily  sertraline 25 milliGRAM(s) Oral daily  sodium chloride 0.9%. 1000 milliLiter(s) (10 mL/Hr) IV Continuous <Continuous>  sodium chloride 3%  Inhalation 3 milliLiter(s) Inhalation every 6 hours  testosterone patch 4 mG/24 Hr(s) 4 milliGRAM(s) Transdermal daily    MEDICATIONS  (PRN):  acetaminophen   Tablet .. 650 milliGRAM(s) Oral every 6 hours PRN Mild Pain (1 - 3)  bisacodyl Suppository 10 milliGRAM(s) Rectal daily PRN Constipation  levalbuterol Inhalation 0.63 milliGRAM(s) Inhalation every 6 hours PRN Shortness of breath or wheeze  oxyCODONE    IR 10 milliGRAM(s) Oral every 6 hours PRN Severe Pain (7 - 10)  oxyCODONE    IR 5 milliGRAM(s) Oral every 4 hours PRN Moderate Pain (4 - 6)        REVIEW OF SYSTEMS:  CONSTITUTIONAL: No fever, weight loss, or fatigue  EYES: No eye pain, visual disturbances, or discharge  ENMT:  No difficulty hearing, tinnitus, vertigo; No sinus or throat pain  NECK: No pain or stiffness  BREASTS: No pain, masses, or nipple discharge  RESPIRATORY: No cough, wheezing, chills or hemoptysis; No Shortness of Breath  CARDIOVASCULAR: No chest pain, palpitations, dizziness, or leg swelling  GASTROINTESTINAL: No abdominal or epigastric pain. No nausea, vomiting, or hematemesis; No diarrhea or constipation. No melena or hematochezia.  GENITOURINARY: No dysuria, frequency, hematuria, or incontinence  NEUROLOGICAL: No headaches, memory loss, loss of strength, numbness, or tremors  SKIN: No itching, burning, rashes, or lesions   LYMPH Nodes: No enlarged glands  ENDOCRINE: No heat or cold intolerance; No hair loss  MUSCULOSKELETAL: No joint pain or swelling; No muscle, back, or extremity pain  PSYCHIATRIC: No depression, anxiety, mood swings, or difficulty sleeping  HEME/LYMPH: No easy bruising, or bleeding gums  ALLERY AND IMMUNOLOGIC: No hives or eczema	      PHYSICAL EXAM:  Vital Signs Last 24 Hrs  T(C): 36.9 (14 Jun 2021 14:00), Max: 36.9 (14 Jun 2021 14:00)  T(F): 98.5 (14 Jun 2021 14:00), Max: 98.5 (14 Jun 2021 14:00)  HR: 69 (14 Jun 2021 15:00) (64 - 92)  BP: 120/79 (14 Jun 2021 15:00) (100/52 - 154/73)  BP(mean): 95 (14 Jun 2021 15:00) (72 - 109)  RR: 15 (14 Jun 2021 15:00) (14 - 26)  SpO2: 97% (14 Jun 2021 15:00) (95% - 100%)  Daily     Daily     Constitutional: NAD	  HEENT:   Normal oral mucosa, PERRL, EOMI	  CVS: Normal S1 / S2, RRR, No murmurs  Pulm: CTA. No wheeze or rale  GI:  + BS, soft, NT / ND   Ext: No LE edema  Vascular: Peripheral pulses palpable 2+ bilaterally  MS: full range of motion in all joints  Neurologic: A&O x 3, Non-focal  Psych: Pleasant, has good insight  Skin: No rash or lesion       	  LABS:	                         9.4    11.58 )-----------( 235      ( 14 Jun 2021 10:14 )             29.3     06-14    136  |  102  |  27<H>  ----------------------------<  167<H>  4.0   |  25  |  1.25    Ca    8.6      14 Jun 2021 10:14  Phos  2.9     06-14  Mg     2.3     06-14    TPro  6.3  /  Alb  3.1<L>  /  TBili  0.6  /  DBili  x   /  AST  18  /  ALT  28  /  AlkPhos  85  06-14    proBNP:   Lipid Profile:   HgA1c:   TSH: 	      EKG:   Telemetry:     Echo:    Electrophysiology Consult Note:     CHIEF COMPLAINT:  Patient is a 61y old  Male who presents with a chief complaint of aortic regurgitation, mitral regurgitation, thoracic aortic aneurysm, and coronary artery disease (2021 10:58)        HISTORY OF PRESENT ILLNESS:   60 y/o M, h/o HTN, CKD, TIA 2007, NSTEMI (2019), h/o drug and alcohol misuse (cocaine/marijuana 30 yr hx), recent hospitalization for pneumonia and heart failure exacerbation, found to have severe AI and aortic aneurysm.  Echo 5/3/21 showed reduced LVEF 45%.  He is now s/p elective replacement of aortic root and ascending/hemiarch replacement and PFO closure on .  EF 40% intraop.  Extubated on . Noted to have post-op pAFib/RVR on 6/10 and was started on Amiodarone/Metoprolol by team.  EPS is called due to wide QRS and to clarify if Amio/bb is indicated.     Cardiac Cath 4/15/21:   Left main: angiographically normal   LAD: significant ectasia noted   Distal LAD: moderate diffuse disease   Circumflex: Significant ectasia noted   Distal Circ: severe diffuse disease   RCA: Significant ectasia noted.       PAST MEDICAL & SURGICAL HISTORY:  Hypertension  Hyperlipidemia  NSTEMI (non-ST elevation myocardial infarction)  CKD (chronic kidney disease)  TIA (transient ischemic attack)  CAD (coronary artery diseae)  Aortic regurgitation  Thoracic aortic aneurysm  Aortic insufficiency  Mitral regurgitation  Aortic aneurysm        FAMILY HISTORY:  FH: hypertension (Mother, Sibling)      SOCIAL HISTORY:    recently quit smoking   Former consumption of alcohol   History of crack cocaine use  History of marijuana use      Allergies  No Known Allergies      MEDICATIONS  (STANDING):  aMIOdarone    Tablet 400 milliGRAM(s) Oral two times a day  aspirin enteric coated 81 milliGRAM(s) Oral daily  atorvastatin 40 milliGRAM(s) Oral at bedtime  carvedilol 25 milliGRAM(s) Oral every 12 hours  chlorhexidine 2% Cloths 1 Application(s) Topical <User Schedule>  heparin   Injectable 5000 Unit(s) SubCutaneous every 8 hours  pantoprazole    Tablet 40 milliGRAM(s) Oral before breakfast  polyethylene glycol 3350 17 Gram(s) Oral daily  sertraline 25 milliGRAM(s) Oral daily  sodium chloride 0.9%. 1000 milliLiter(s) (10 mL/Hr) IV Continuous <Continuous>  sodium chloride 3%  Inhalation 3 milliLiter(s) Inhalation every 6 hours  testosterone patch 4 mG/24 Hr(s) 4 milliGRAM(s) Transdermal daily    MEDICATIONS  (PRN):  acetaminophen   Tablet .. 650 milliGRAM(s) Oral every 6 hours PRN Mild Pain (1 - 3)  bisacodyl Suppository 10 milliGRAM(s) Rectal daily PRN Constipation  levalbuterol Inhalation 0.63 milliGRAM(s) Inhalation every 6 hours PRN Shortness of breath or wheeze  oxyCODONE    IR 10 milliGRAM(s) Oral every 6 hours PRN Severe Pain (7 - 10)  oxyCODONE    IR 5 milliGRAM(s) Oral every 4 hours PRN Moderate Pain (4 - 6)        REVIEW OF SYSTEMS:  CONSTITUTIONAL: No fever, weight loss, or fatigue  EYES: No eye pain, visual disturbances, or discharge  ENMT:  No difficulty hearing, tinnitus, vertigo; No sinus or throat pain  NECK: No pain or stiffness  BREASTS: No pain, masses, or nipple discharge  RESPIRATORY: No cough, wheezing, chills or hemoptysis; No Shortness of Breath  CARDIOVASCULAR: No chest pain, palpitations, dizziness, or leg swelling  GASTROINTESTINAL: No abdominal or epigastric pain. No nausea, vomiting, or hematemesis; No diarrhea or constipation. No melena or hematochezia.  GENITOURINARY: No dysuria, frequency, hematuria, or incontinence  NEUROLOGICAL: No headaches, memory loss, loss of strength, numbness, or tremors  SKIN: No itching, burning, rashes, or lesions   LYMPH Nodes: No enlarged glands  ENDOCRINE: No heat or cold intolerance; No hair loss  MUSCULOSKELETAL: No joint pain or swelling; No muscle, back, or extremity pain  PSYCHIATRIC: No depression, anxiety, mood swings, or difficulty sleeping  HEME/LYMPH: No easy bruising, or bleeding gums  ALLERY AND IMMUNOLOGIC: No hives or eczema	      PHYSICAL EXAM:  Vital Signs Last 24 Hrs  T(C): 36.9 (2021 14:00), Max: 36.9 (2021 14:00)  T(F): 98.5 (2021 14:00), Max: 98.5 (2021 14:00)  HR: 69 (2021 15:00) (64 - 92)  BP: 120/79 (2021 15:00) (100/52 - 154/73)  BP(mean): 95 (2021 15:00) (72 - 109)  RR: 15 (2021 15:00) (14 - 26)  SpO2: 97% (2021 15:00) (95% - 100%)    Constitutional: NAD. on nasal CPAP/Bipap	  CVS: Normal S1 / S2, RRR, sternal wound dry/intact.   Pulm: clear anteriorly   GI:  + BS, soft, NT / ND   Ext: No LE edema. sequential compression device on   Vascular: Peripheral pulses palpable 2+ bilaterally  Neurologic: A&O x 3, Non-focal      	  LABS:	                         9.4    11.58 )-----------( 235      ( 2021 10:14 )             29.3     06-14    136  |  102  |  27<H>  ----------------------------<  167<H>  4.0   |  25  |  1.25    Ca    8.6      2021 10:14  Phos  2.9     06-14  Mg     2.3     06-14    TPro  6.3  /  Alb  3.1<L>  /  TBili  0.6  /  DBili  x   /  AST  18  /  ALT  28  /  AlkPhos  85  06-14      EKG: outpatient EKG on 21:  NSR 75 bpm. First degree AVB  ms.  RBBB with LAFB ( ms). QTc 485ms.   KE21: NSR 69 bpm. RBBB/ LAFB ( ms).   ms.   EK21: NSR 75 bpm with PAC.  RBBB / LAFB ( ms). QTc 587 ms.     Telemetry: NSR with RBBB. HR 70s. No HB. No further AFIB since pt's room was relocated to [facility] (prior tele information when pt was in another room was not transferred over per tele tech).   21: 7 beats of NSVT     MICAELA w/Doppler (21 @ 14:34)    1. Mildly reduced left ventricular systolic function. LVEF 45%.    2. Normal right ventricular size.   3. Dhtiwb-ho-jcbfbiciuc reduced rightventricular systolic function.   4. No LA/RA/MRAIS/RAA thrombus seen.   5. Color flow Doppler reveals evidence of a patent foramen ovale with primarily left to right shunting by color doppler.   6. Mild-to-moderate mitral regurgitation.   7. Fibrocalcific tricuspid aortic valve without significant stenosis. The aortic valve leaflets do not coapt. There is severe aortic regurgitation. The vena contracta width is 0.60 cm (severe >0.6 cm). There is diastolic flow reversal in the descending aorta supporting severe aortic regurgitation diagnosis.   8. The aortic root is moderately dilated. The aortic root measures 4.60 cm at level of the sinuses of Valsalva (normal 3.1-3.7 cm for men, 2.7-3.3 cm for women).   9. No pericardial effusion.    e

## 2021-06-15 LAB
ALBUMIN SERPL ELPH-MCNC: 3.1 G/DL — LOW (ref 3.3–5)
ALP SERPL-CCNC: 57 U/L — SIGNIFICANT CHANGE UP (ref 40–120)
ALT FLD-CCNC: 26 U/L — SIGNIFICANT CHANGE UP (ref 10–45)
ANION GAP SERPL CALC-SCNC: 13 MMOL/L — SIGNIFICANT CHANGE UP (ref 5–17)
AST SERPL-CCNC: 17 U/L — SIGNIFICANT CHANGE UP (ref 10–40)
BILIRUB SERPL-MCNC: 0.5 MG/DL — SIGNIFICANT CHANGE UP (ref 0.2–1.2)
BUN SERPL-MCNC: 24 MG/DL — HIGH (ref 7–23)
CALCIUM SERPL-MCNC: 8.6 MG/DL — SIGNIFICANT CHANGE UP (ref 8.4–10.5)
CHLORIDE SERPL-SCNC: 100 MMOL/L — SIGNIFICANT CHANGE UP (ref 96–108)
CO2 SERPL-SCNC: 23 MMOL/L — SIGNIFICANT CHANGE UP (ref 22–31)
CREAT SERPL-MCNC: 1.08 MG/DL — SIGNIFICANT CHANGE UP (ref 0.5–1.3)
GLUCOSE SERPL-MCNC: 102 MG/DL — HIGH (ref 70–99)
HCT VFR BLD CALC: 27.5 % — LOW (ref 39–50)
HGB BLD-MCNC: 9.1 G/DL — LOW (ref 13–17)
MAGNESIUM SERPL-MCNC: 2 MG/DL — SIGNIFICANT CHANGE UP (ref 1.6–2.6)
MCHC RBC-ENTMCNC: 28.5 PG — SIGNIFICANT CHANGE UP (ref 27–34)
MCHC RBC-ENTMCNC: 33.1 GM/DL — SIGNIFICANT CHANGE UP (ref 32–36)
MCV RBC AUTO: 86.2 FL — SIGNIFICANT CHANGE UP (ref 80–100)
NRBC # BLD: 0 /100 WBCS — SIGNIFICANT CHANGE UP (ref 0–0)
PHOSPHATE SERPL-MCNC: 3.4 MG/DL — SIGNIFICANT CHANGE UP (ref 2.5–4.5)
PLATELET # BLD AUTO: 259 K/UL — SIGNIFICANT CHANGE UP (ref 150–400)
POTASSIUM SERPL-MCNC: 3.8 MMOL/L — SIGNIFICANT CHANGE UP (ref 3.5–5.3)
POTASSIUM SERPL-SCNC: 3.8 MMOL/L — SIGNIFICANT CHANGE UP (ref 3.5–5.3)
PROT SERPL-MCNC: 6 G/DL — SIGNIFICANT CHANGE UP (ref 6–8.3)
RBC # BLD: 3.19 M/UL — LOW (ref 4.2–5.8)
RBC # FLD: 15.5 % — HIGH (ref 10.3–14.5)
SODIUM SERPL-SCNC: 136 MMOL/L — SIGNIFICANT CHANGE UP (ref 135–145)
WBC # BLD: 12.65 K/UL — HIGH (ref 3.8–10.5)
WBC # FLD AUTO: 12.65 K/UL — HIGH (ref 3.8–10.5)

## 2021-06-15 PROCEDURE — 99292 CRITICAL CARE ADDL 30 MIN: CPT

## 2021-06-15 PROCEDURE — 99291 CRITICAL CARE FIRST HOUR: CPT

## 2021-06-15 PROCEDURE — 71045 X-RAY EXAM CHEST 1 VIEW: CPT | Mod: 26

## 2021-06-15 RX ORDER — SODIUM CHLORIDE 9 MG/ML
3 INJECTION INTRAMUSCULAR; INTRAVENOUS; SUBCUTANEOUS EVERY 8 HOURS
Refills: 0 | Status: DISCONTINUED | OUTPATIENT
Start: 2021-06-15 | End: 2021-06-18

## 2021-06-15 RX ORDER — FUROSEMIDE 40 MG
40 TABLET ORAL ONCE
Refills: 0 | Status: COMPLETED | OUTPATIENT
Start: 2021-06-15 | End: 2021-06-15

## 2021-06-15 RX ORDER — POTASSIUM CHLORIDE 20 MEQ
20 PACKET (EA) ORAL DAILY
Refills: 0 | Status: DISCONTINUED | OUTPATIENT
Start: 2021-06-16 | End: 2021-06-18

## 2021-06-15 RX ORDER — POTASSIUM CHLORIDE 20 MEQ
40 PACKET (EA) ORAL ONCE
Refills: 0 | Status: COMPLETED | OUTPATIENT
Start: 2021-06-15 | End: 2021-06-15

## 2021-06-15 RX ORDER — FUROSEMIDE 40 MG
40 TABLET ORAL DAILY
Refills: 0 | Status: DISCONTINUED | OUTPATIENT
Start: 2021-06-16 | End: 2021-06-18

## 2021-06-15 RX ORDER — POTASSIUM CHLORIDE 20 MEQ
20 PACKET (EA) ORAL ONCE
Refills: 0 | Status: COMPLETED | OUTPATIENT
Start: 2021-06-15 | End: 2021-06-15

## 2021-06-15 RX ADMIN — SODIUM CHLORIDE 3 MILLILITER(S): 9 INJECTION INTRAMUSCULAR; INTRAVENOUS; SUBCUTANEOUS at 15:09

## 2021-06-15 RX ADMIN — Medication 4 MILLIGRAM(S): at 20:00

## 2021-06-15 RX ADMIN — SODIUM CHLORIDE 3 MILLILITER(S): 9 INJECTION INTRAMUSCULAR; INTRAVENOUS; SUBCUTANEOUS at 12:05

## 2021-06-15 RX ADMIN — Medication 81 MILLIGRAM(S): at 11:07

## 2021-06-15 RX ADMIN — OXYCODONE HYDROCHLORIDE 5 MILLIGRAM(S): 5 TABLET ORAL at 11:07

## 2021-06-15 RX ADMIN — HEPARIN SODIUM 5000 UNIT(S): 5000 INJECTION INTRAVENOUS; SUBCUTANEOUS at 21:14

## 2021-06-15 RX ADMIN — SERTRALINE 25 MILLIGRAM(S): 25 TABLET, FILM COATED ORAL at 11:07

## 2021-06-15 RX ADMIN — CARVEDILOL PHOSPHATE 25 MILLIGRAM(S): 80 CAPSULE, EXTENDED RELEASE ORAL at 17:08

## 2021-06-15 RX ADMIN — Medication 4 MILLIGRAM(S): at 11:08

## 2021-06-15 RX ADMIN — OXYCODONE HYDROCHLORIDE 5 MILLIGRAM(S): 5 TABLET ORAL at 12:05

## 2021-06-15 RX ADMIN — CHLORHEXIDINE GLUCONATE 1 APPLICATION(S): 213 SOLUTION TOPICAL at 06:16

## 2021-06-15 RX ADMIN — SODIUM CHLORIDE 3 MILLILITER(S): 9 INJECTION INTRAMUSCULAR; INTRAVENOUS; SUBCUTANEOUS at 01:12

## 2021-06-15 RX ADMIN — Medication 40 MILLIGRAM(S): at 08:27

## 2021-06-15 RX ADMIN — SODIUM CHLORIDE 3 MILLILITER(S): 9 INJECTION INTRAMUSCULAR; INTRAVENOUS; SUBCUTANEOUS at 06:13

## 2021-06-15 RX ADMIN — HEPARIN SODIUM 5000 UNIT(S): 5000 INJECTION INTRAVENOUS; SUBCUTANEOUS at 06:37

## 2021-06-15 RX ADMIN — CARVEDILOL PHOSPHATE 25 MILLIGRAM(S): 80 CAPSULE, EXTENDED RELEASE ORAL at 06:37

## 2021-06-15 RX ADMIN — Medication 20 MILLIEQUIVALENT(S): at 06:37

## 2021-06-15 RX ADMIN — HEPARIN SODIUM 5000 UNIT(S): 5000 INJECTION INTRAVENOUS; SUBCUTANEOUS at 15:09

## 2021-06-15 RX ADMIN — SODIUM CHLORIDE 3 MILLILITER(S): 9 INJECTION INTRAMUSCULAR; INTRAVENOUS; SUBCUTANEOUS at 18:15

## 2021-06-15 RX ADMIN — Medication 4 MILLIGRAM(S): at 06:17

## 2021-06-15 RX ADMIN — ATORVASTATIN CALCIUM 40 MILLIGRAM(S): 80 TABLET, FILM COATED ORAL at 21:14

## 2021-06-15 RX ADMIN — AMIODARONE HYDROCHLORIDE 200 MILLIGRAM(S): 400 TABLET ORAL at 06:37

## 2021-06-15 RX ADMIN — Medication 4 MILLIGRAM(S): at 11:07

## 2021-06-15 RX ADMIN — PANTOPRAZOLE SODIUM 40 MILLIGRAM(S): 20 TABLET, DELAYED RELEASE ORAL at 06:37

## 2021-06-15 RX ADMIN — Medication 40 MILLIEQUIVALENT(S): at 08:27

## 2021-06-15 RX ADMIN — SODIUM CHLORIDE 3 MILLILITER(S): 9 INJECTION INTRAMUSCULAR; INTRAVENOUS; SUBCUTANEOUS at 21:15

## 2021-06-15 NOTE — PROGRESS NOTE ADULT - SUBJECTIVE AND OBJECTIVE BOX
INTERVAL HPI/OVERNIGHT EVENTS:    6/7: RTOR - Reexploration, mediastinum, sternotomy approach   6/7: Replacement of aortic root and ascending/hemiarch replacement. PFO closure   EF 40%  referring: Dr. Gerry Rushing.     62yo Male Hx tobacco abuse - (quit 2 mo ago), HTN, hyperlipidemia, CKD III, TIA (2007), CAD/MI, prior drug and alcohol misuse (cocaine/marijuana 30 yr hx), recent hospitalization for PNA/CHF exacerbation - w/u: dilated aorta, and coronary artery disease. Sxs SOB/MULLIGAN    Cath: severe AI. CTa imaging performed     MICAELA 5/3/21: Mildly reduced LV systolic function. Mild-mod reduced RV systolic function.   No LA/RA/MARIS/RAA thrombus seen. (+)PFO with primarily left to right shunting Mild-to-mod MR  Severe aortic regurgitation. The vena contracta width is 0.60 cm (severe >0.6 cm). There is diastolic flow reversal in the descending aorta supporting severe aortic regurgitation diagnosis.   The aortic root is moderately dilated. The aortic root measures 4.60 cm at level of the sinuses of  Valsalva (normal 3.1-3.7 cm for men, 2.7-3.3 cm for women). No pericardial effusion.     CTA Chest 5/12: aortic root 4.7cm. ascending aorta 4.5cm. incidental finding of distal left atrial appendage incomplete enhancement vs early thrombus formation.     To OR 6/7  Intraop: 2L Crystalloid/2 FFP/1 platelet/10 Cryo/1000 FEIBA  arrived to ICU on levophed     RTOR later same day (6/7) for bleeding/exploration - multiple diffuse small vessels bleeding.   Clot evacuated from R pleural space and washed out  given - 1.5 L Crystalloid/1 U pRBC and 1000 FEIBA  Arrived to ICU - Epi and Levophed     Bronch then extubated 6/8  nicardipine for BP control   6/10: post-op Fib/RVR - Amiodarone/Metoprolol      diuresis initiated ; bronch/pulm toilet and nocturnal NIV (nasal bipap being utilized)  Up and Ambulating with staff several times 6/13 by report    patient reports feeling fatigued and SOB this am - placed on HFNC and diuretic dosing given w/improvement and titrated back to NC and ultimately to RA    No acute events reported overnight     PMHx includes but is not limited to:   Hypertension  Hyperlipidemia  CAD/NSTEMI (non-ST elevation myocardial infarction)  CKD (chronic kidney disease)  Aortic regurgitation/Aortic insufficiency  Thoracic aortic aneurysm  Mitral regurgitation  Aortic aneurysm    ICU Vital Signs Last 24 Hrs  T(C): 36.7 (15 Tan 2021 05:03), Max: 36.9 (14 Jun 2021 14:00)  T(F): 98.1 (15 Tan 2021 05:03), Max: 98.5 (14 Jun 2021 14:00)  HR: 67 (15 Tan 2021 10:00) (66 - 75) sinus   BP: 128/73 (15 Tan 2021 10:00) (116/69 - 167/82)  BP(mean): 95 (15 Tan 2021 10:00) (87 - 123)  RR: 20 (15 Tan 2021 10:00) (15 - 25)  SpO2: 96% (15 Tan 2021 10:00) (94% - 100%) RA    Qtts: None     I&O's Summary    14 Jun 2021 07:01  -  15 Tan 2021 07:00  --------------------------------------------------------  IN: 460 mL / OUT: 1550 mL / NET: -1090 mL    15 Tan 2021 07:01  -  15 Tan 2021 10:20  --------------------------------------------------------  IN: 150 mL / OUT: 0 mL / NET: 150 mL    Physical Exam    Heart - regular (-)rub/gallop  Lungs - poor inspiratory effort - no rhonchi/wheeze  Abd - (+)Bs Soft NTND (-)r/r/g  Ext - warm to touch; no cyanosis/clubbing   Neuro - alert/oriented and interactive - non-focal   Skin - no rash      LABS:                        9.1    12.65 )-----------( 259      ( 15 Tan 2021 02:27 )             27.5     06-15    136  |  100  |  24<H>  ----------------------------<  102<H>  3.8   |  23  |  1.08    Ca    8.6      15 Tan 2021 02:27  Phos  3.4     06-15  Mg     2.0     06-15    TPro  6.0  /  Alb  3.1<L>  /  TBili  0.5  /  DBili  x   /  AST  17  /  ALT  26  /  AlkPhos  57  06-15    RADIOLOGY & ADDITIONAL STUDIES: reviewed     Patient s/p elective Replacement of aortic root and ascending/hemiarch replacement. PFO closure w/RTOR same juan carlos for Reexploration, mediastinum, sternotomy approach - slow but sustained progress in recovery post-op     1. CV  hemodynamically stable  sinus rhythm   ASA/statin   Amiodarone po load ongoing   Coreg 25 BID   daily diuretic dosing    2. Pulm   subjective sensation of SOB - Sa02 good  HFNC for patient comfort - diuretic dosing given with favorable response now back on RA  incentive spirometry/ambulation with staff assist  sputum Cx 6/12 - normal adali .   nebs   Hx COPD and smoking Hx     maintain glycemic control - serum 102 ; HgA1c 6  Bowel regimen - last BM 6/14 & 15    DVT and GI prophylaxis     d/w patient/staff and CTS    I have spent/provided stated minutes of critical care time to this patient: 90

## 2021-06-15 NOTE — CHART NOTE - NSCHARTNOTEFT_GEN_A_CORE
Admitting Diagnosis:   Patient is a 61y old  Male who presents with a chief complaint of aortic regurgitation, mitral regurgitation, thoracic aortic aneurysm, and coronary artery disease (15 Tan 2021 10:13)      PAST MEDICAL & SURGICAL HISTORY:  Hypertension    Hyperlipidemia    NSTEMI (non-ST elevation myocardial infarction)    CKD (chronic kidney disease)    TIA (transient ischemic attack)    CAD (coronary artery disease)    Aortic regurgitation    Thoracic aortic aneurysm    Aortic insufficiency    Mitral regurgitation    Aortic aneurysm    No significant past surgical history        Current Nutrition Order: Cst Cho no Snack + Low Na + Ensure Enlive TID (1050 kcal, 60g protein, 540mL free H2O)        PO Intake: Good (%) [   ]  Fair (50-75%) [ x  ] Poor (<25%) [   ]    GI Issues:   Abdomen soft/ NT, no n/v  No BM noted     Pain: denies     Skin Integrity:  Luis 20  1+ generalized edema  2+ B/L feet  Incision to midline     Labs:   06-15    136  |  100  |  24<H>  ----------------------------<  102<H>  3.8   |  23  |  1.08    Ca    8.6      15 Tan 2021 02:27  Phos  3.4     06-15  Mg     2.0     06-15    TPro  6.0  /  Alb  3.1<L>  /  TBili  0.5  /  DBili  x   /  AST  17  /  ALT  26  /  AlkPhos  57  06-15    CAPILLARY BLOOD GLUCOSE          Medications:  MEDICATIONS  (STANDING):  aMIOdarone    Tablet 200 milliGRAM(s) Oral daily  aspirin enteric coated 81 milliGRAM(s) Oral daily  atorvastatin 40 milliGRAM(s) Oral at bedtime  carvedilol 25 milliGRAM(s) Oral every 12 hours  heparin   Injectable 5000 Unit(s) SubCutaneous every 8 hours  pantoprazole    Tablet 40 milliGRAM(s) Oral before breakfast  polyethylene glycol 3350 17 Gram(s) Oral daily  sertraline 25 milliGRAM(s) Oral daily  sodium chloride 0.9% lock flush 3 milliLiter(s) IV Push every 8 hours  sodium chloride 3%  Inhalation 3 milliLiter(s) Inhalation every 6 hours  testosterone patch 4 mG/24 Hr(s) 4 milliGRAM(s) Transdermal daily    MEDICATIONS  (PRN):  acetaminophen   Tablet .. 650 milliGRAM(s) Oral every 6 hours PRN Mild Pain (1 - 3)  bisacodyl Suppository 10 milliGRAM(s) Rectal daily PRN Constipation  levalbuterol Inhalation 0.63 milliGRAM(s) Inhalation every 6 hours PRN Shortness of breath or wheeze  oxyCODONE    IR 10 milliGRAM(s) Oral every 6 hours PRN Severe Pain (7 - 10)  oxyCODONE    IR 5 milliGRAM(s) Oral every 4 hours PRN Moderate Pain (4 - 6)      Weight: 98.9kg (6/4)     Weight Change: no new wts     Nutrition Focused Physical Exam: Completed [   ]  Not Pertinent [ x  ]    Estimated energy needs:   Ideal body weight used for calculations as pt >120% of IBW. (123% IBW). Nutrient needs based on [facility] standards of care for maintenance in adults adjusted for post-op needs, age, fluid per team  2000-2400kcal (25-30kcal/kg)   96-112g pro (1.2-1.4g/kg pro)     Subjective:   61 year old male, ex-smoker (quit 2 months ago), with a past medical history of hypertension, hyperlipidemia, CKD stage 3, TIA (2007), CAD s/p NSTEMI (Jan 2019), drug and alcohol misuse (cocaine/marijuana 30 yr hx), recent hospitalization for pneumonia and heart failure exacerbation, presents for evaluation and management of his valvular disease, dilated aorta, and coronary artery disease. NYHA Class II-III. He is referred by Dr. Gerry Rushing. Went to OR 6/7 for PFO closure, remains in CTICU for post-op management at this time. Pt on 9E this morning, required HFNC yesterday, now stable on room air, transferred to 9L later in morning. Notes fair-good intake at present and while at home. Encouraged intake through day and reinforced purpose of Ensure + low Na diet. Receptive to education, wife at bedside. Intake improving. Will follow per protocol.    Previous Nutrition Diagnosis: Increased nutrient needs r/t hypermetabolic state AEB post-op needs     Active [ x  ]  Resolved [   ]    If resolved, new PES:     Goal: meet >75% EER     Recommendations:  1. Encourage intake through day   2. Manage pain   3. Trend wts   4. Reinforce ed   5. Monitor and replete lytes  6. C/w Ensure Enlive TID (1050 kcal, 60g protein, 540mL free H2O)     Education: encouraged intake through day + nutrition supplements     Risk Level: High [   ] Moderate [ x  ] Low [   ]
Pt seen and examined at bedside.   Minimal output from CTs. CXR stable. Carlton drain x 4 were removed. A tie down was secured in place and an occlusive dressing applied. Pt tolerated the procedure well and remained HD stable.  Follow up CXR showed no ptx

## 2021-06-15 NOTE — PROGRESS NOTE ADULT - SUBJECTIVE AND OBJECTIVE BOX
ICU Transfer Note      Operation / Date:   6/7/21: Aortic root, ascending, hemiarch replacement, PFO closure EF 40%   6/7/21: RTOR bleeding     SUBJECTIVE ASSESSMENT:  Patient transferred from ICU, HD stable.      Vital Signs Last 24 Hrs  T(C): 36.8 (15 Tan 2021 09:00), Max: 36.9 (15 Tan 2021 01:01)  T(F): 98.2 (15 Tan 2021 09:00), Max: 98.4 (15 Tan 2021 01:01)  HR: 67 (15 Tan 2021 14:00) (66 - 75)  BP: 135/78 (15 Tan 2021 14:00) (116/69 - 167/82)  BP(mean): 100 (15 Tan 2021 14:00) (87 - 123)  RR: 20 (15 Tan 2021 14:00) (15 - 34)  SpO2: 97% (15 Tan 2021 14:00) (94% - 99%)  I&O's Detail    14 Jun 2021 07:01  -  15 Tan 2021 07:00  --------------------------------------------------------  IN:    IV PiggyBack: 100 mL    Oral Fluid: 360 mL  Total IN: 460 mL    OUT:    Voided (mL): 1550 mL  Total OUT: 1550 mL    Total NET: -1090 mL      15 Tan 2021 07:01  -  15 Tan 2021 15:02  --------------------------------------------------------  IN:    Oral Fluid: 250 mL  Total IN: 250 mL    OUT:    Voided (mL): 250 mL  Total OUT: 250 mL    Total NET: 0 mL    CHEST TUBE:  Yes/No. AIR LEAKS: Yes/No. Suction / H2O SEAL.   BRET DRAIN:  Yes/No.  EPICARDIAL WIRES: Yes/No.  TIE DOWNS: Yes/No.  KEMP: Yes/No.    PHYSICAL EXAM:    General:     Neurological:    Cardiovascular:    Respiratory:    Gastrointestinal:    Extremities:    Vascular:    Incision Sites:    LABS:                        9.1    12.65 )-----------( 259      ( 15 Tan 2021 02:27 )             27.5       06-15    136  |  100  |  24<H>  ----------------------------<  102<H>  3.8   |  23  |  1.08    Ca    8.6      15 Tan 2021 02:27  Phos  3.4     06-15  Mg     2.0     06-15    TPro  6.0  /  Alb  3.1<L>  /  TBili  0.5  /  DBili  x   /  AST  17  /  ALT  26  /  AlkPhos  57  06-15    MEDICATIONS  (STANDING):  aMIOdarone    Tablet 200 milliGRAM(s) Oral daily  aspirin enteric coated 81 milliGRAM(s) Oral daily  atorvastatin 40 milliGRAM(s) Oral at bedtime  carvedilol 25 milliGRAM(s) Oral every 12 hours  heparin   Injectable 5000 Unit(s) SubCutaneous every 8 hours  pantoprazole    Tablet 40 milliGRAM(s) Oral before breakfast  polyethylene glycol 3350 17 Gram(s) Oral daily  sertraline 25 milliGRAM(s) Oral daily  sodium chloride 0.9% lock flush 3 milliLiter(s) IV Push every 8 hours  sodium chloride 3%  Inhalation 3 milliLiter(s) Inhalation every 6 hours  testosterone patch 4 mG/24 Hr(s) 4 milliGRAM(s) Transdermal daily    MEDICATIONS  (PRN):  acetaminophen   Tablet .. 650 milliGRAM(s) Oral every 6 hours PRN Mild Pain (1 - 3)  bisacodyl Suppository 10 milliGRAM(s) Rectal daily PRN Constipation  levalbuterol Inhalation 0.63 milliGRAM(s) Inhalation every 6 hours PRN Shortness of breath or wheeze  oxyCODONE    IR 10 milliGRAM(s) Oral every 6 hours PRN Severe Pain (7 - 10)  oxyCODONE    IR 5 milliGRAM(s) Oral every 4 hours PRN Moderate Pain (4 - 6)        RADIOLOGY & ADDITIONAL TESTS:     ICU Transfer Note      Operation / Date:   6/7/21: Aortic root, ascending, hemiarch replacement, PFO closure EF 40%   6/7/21: RTOR bleeding     SUBJECTIVE ASSESSMENT:  Patient transferred from ICU, HD stable.  Patient is c/o incisional pain with coughing and yawning.  Also feeling weak and insecure about going home, he wants to know [facility] to [facility].  He denies HA, dizziness, CP, SOB, palpitations, N/V/D/C, leg swelling or calf tenderness.    Vital Signs Last 24 Hrs  T(C): 36.8 (15 Tan 2021 09:00), Max: 36.9 (15 Tan 2021 01:01)  T(F): 98.2 (15 Tan 2021 09:00), Max: 98.4 (15 Tan 2021 01:01)  HR: 67 (15 Tan 2021 14:00) (66 - 75)  BP: 135/78 (15 Tan 2021 14:00) (116/69 - 167/82)  BP(mean): 100 (15 Tan 2021 14:00) (87 - 123)  RR: 20 (15 Tan 2021 14:00) (15 - 34)  SpO2: 97% (15 Tan 2021 14:00) (94% - 99%)  I&O's Detail    14 Jun 2021 07:01  -  15 Tan 2021 07:00  --------------------------------------------------------  IN:    IV PiggyBack: 100 mL    Oral Fluid: 360 mL  Total IN: 460 mL    OUT:    Voided (mL): 1550 mL  Total OUT: 1550 mL    Total NET: -1090 mL      15 Tan 2021 07:01  -  15 Tan 2021 15:02  --------------------------------------------------------  IN:    Oral Fluid: 250 mL  Total IN: 250 mL    OUT:    Voided (mL): 250 mL  Total OUT: 250 mL    Total NET: 0 mL    CHEST TUBE:  NoGermán QUEEN DRAIN: No.  EPICARDIAL WIRES: Yes  TIE DOWNS: No.  KEMP: [place] cath    PHYSICAL EXAM:    General: OOB in chair, comfortable, NAD    Neurological: A&O x 3 non focal     Cardiovascular: S1S2 RRR No M/G/R    Respiratory: CTA b/l No W/R/R    Gastrointestinal: soft, NT/ND    Extremities: No edema, no calf tenderness    Vascular: warm and well perfused    Incision Sites: MSI healing well, no drainage.      LABS:                        9.1    12.65 )-----------( 259      ( 15 Tan 2021 02:27 )             27.5       06-15    136  |  100  |  24<H>  ----------------------------<  102<H>  3.8   |  23  |  1.08    Ca    8.6      15 Tan 2021 02:27  Phos  3.4     06-15  Mg     2.0     06-15    TPro  6.0  /  Alb  3.1<L>  /  TBili  0.5  /  DBili  x   /  AST  17  /  ALT  26  /  AlkPhos  57  06-15    MEDICATIONS  (STANDING):  aMIOdarone    Tablet 200 milliGRAM(s) Oral daily  aspirin enteric coated 81 milliGRAM(s) Oral daily  atorvastatin 40 milliGRAM(s) Oral at bedtime  carvedilol 25 milliGRAM(s) Oral every 12 hours  heparin   Injectable 5000 Unit(s) SubCutaneous every 8 hours  pantoprazole    Tablet 40 milliGRAM(s) Oral before breakfast  polyethylene glycol 3350 17 Gram(s) Oral daily  sertraline 25 milliGRAM(s) Oral daily  sodium chloride 0.9% lock flush 3 milliLiter(s) IV Push every 8 hours  sodium chloride 3%  Inhalation 3 milliLiter(s) Inhalation every 6 hours  testosterone patch 4 mG/24 Hr(s) 4 milliGRAM(s) Transdermal daily    MEDICATIONS  (PRN):  acetaminophen   Tablet .. 650 milliGRAM(s) Oral every 6 hours PRN Mild Pain (1 - 3)  bisacodyl Suppository 10 milliGRAM(s) Rectal daily PRN Constipation  levalbuterol Inhalation 0.63 milliGRAM(s) Inhalation every 6 hours PRN Shortness of breath or wheeze  oxyCODONE    IR 10 milliGRAM(s) Oral every 6 hours PRN Severe Pain (7 - 10)  oxyCODONE    IR 5 milliGRAM(s) Oral every 4 hours PRN Moderate Pain (4 - 6)        RADIOLOGY & ADDITIONAL TESTS:

## 2021-06-16 LAB
ANION GAP SERPL CALC-SCNC: 12 MMOL/L — SIGNIFICANT CHANGE UP (ref 5–17)
BASOPHILS # BLD AUTO: 0.03 K/UL — SIGNIFICANT CHANGE UP (ref 0–0.2)
BASOPHILS NFR BLD AUTO: 0.3 % — SIGNIFICANT CHANGE UP (ref 0–2)
BUN SERPL-MCNC: 22 MG/DL — SIGNIFICANT CHANGE UP (ref 7–23)
CALCIUM SERPL-MCNC: 8.7 MG/DL — SIGNIFICANT CHANGE UP (ref 8.4–10.5)
CHLORIDE SERPL-SCNC: 99 MMOL/L — SIGNIFICANT CHANGE UP (ref 96–108)
CO2 SERPL-SCNC: 22 MMOL/L — SIGNIFICANT CHANGE UP (ref 22–31)
CREAT SERPL-MCNC: 1.15 MG/DL — SIGNIFICANT CHANGE UP (ref 0.5–1.3)
EOSINOPHIL # BLD AUTO: 0.12 K/UL — SIGNIFICANT CHANGE UP (ref 0–0.5)
EOSINOPHIL NFR BLD AUTO: 1.1 % — SIGNIFICANT CHANGE UP (ref 0–6)
GLUCOSE SERPL-MCNC: 94 MG/DL — SIGNIFICANT CHANGE UP (ref 70–99)
HCT VFR BLD CALC: 28.5 % — LOW (ref 39–50)
HGB BLD-MCNC: 9.2 G/DL — LOW (ref 13–17)
IMM GRANULOCYTES NFR BLD AUTO: 1.1 % — SIGNIFICANT CHANGE UP (ref 0–1.5)
LYMPHOCYTES # BLD AUTO: 2.31 K/UL — SIGNIFICANT CHANGE UP (ref 1–3.3)
LYMPHOCYTES # BLD AUTO: 20.2 % — SIGNIFICANT CHANGE UP (ref 13–44)
MAGNESIUM SERPL-MCNC: 2.1 MG/DL — SIGNIFICANT CHANGE UP (ref 1.6–2.6)
MCHC RBC-ENTMCNC: 28.5 PG — SIGNIFICANT CHANGE UP (ref 27–34)
MCHC RBC-ENTMCNC: 32.3 GM/DL — SIGNIFICANT CHANGE UP (ref 32–36)
MCV RBC AUTO: 88.2 FL — SIGNIFICANT CHANGE UP (ref 80–100)
MONOCYTES # BLD AUTO: 0.97 K/UL — HIGH (ref 0–0.9)
MONOCYTES NFR BLD AUTO: 8.5 % — SIGNIFICANT CHANGE UP (ref 2–14)
NEUTROPHILS # BLD AUTO: 7.86 K/UL — HIGH (ref 1.8–7.4)
NEUTROPHILS NFR BLD AUTO: 68.8 % — SIGNIFICANT CHANGE UP (ref 43–77)
NRBC # BLD: 0 /100 WBCS — SIGNIFICANT CHANGE UP (ref 0–0)
PLATELET # BLD AUTO: 314 K/UL — SIGNIFICANT CHANGE UP (ref 150–400)
POTASSIUM SERPL-MCNC: 4.1 MMOL/L — SIGNIFICANT CHANGE UP (ref 3.5–5.3)
POTASSIUM SERPL-SCNC: 4.1 MMOL/L — SIGNIFICANT CHANGE UP (ref 3.5–5.3)
RBC # BLD: 3.23 M/UL — LOW (ref 4.2–5.8)
RBC # FLD: 15.4 % — HIGH (ref 10.3–14.5)
SODIUM SERPL-SCNC: 133 MMOL/L — LOW (ref 135–145)
WBC # BLD: 11.41 K/UL — HIGH (ref 3.8–10.5)
WBC # FLD AUTO: 11.41 K/UL — HIGH (ref 3.8–10.5)

## 2021-06-16 PROCEDURE — 71045 X-RAY EXAM CHEST 1 VIEW: CPT | Mod: 26

## 2021-06-16 RX ADMIN — ATORVASTATIN CALCIUM 40 MILLIGRAM(S): 80 TABLET, FILM COATED ORAL at 22:00

## 2021-06-16 RX ADMIN — SERTRALINE 25 MILLIGRAM(S): 25 TABLET, FILM COATED ORAL at 13:14

## 2021-06-16 RX ADMIN — SODIUM CHLORIDE 3 MILLILITER(S): 9 INJECTION INTRAMUSCULAR; INTRAVENOUS; SUBCUTANEOUS at 22:00

## 2021-06-16 RX ADMIN — Medication 4 MILLIGRAM(S): at 18:42

## 2021-06-16 RX ADMIN — Medication 20 MILLIEQUIVALENT(S): at 13:17

## 2021-06-16 RX ADMIN — SODIUM CHLORIDE 3 MILLILITER(S): 9 INJECTION INTRAMUSCULAR; INTRAVENOUS; SUBCUTANEOUS at 05:24

## 2021-06-16 RX ADMIN — Medication 40 MILLIGRAM(S): at 06:24

## 2021-06-16 RX ADMIN — AMIODARONE HYDROCHLORIDE 200 MILLIGRAM(S): 400 TABLET ORAL at 05:27

## 2021-06-16 RX ADMIN — CARVEDILOL PHOSPHATE 25 MILLIGRAM(S): 80 CAPSULE, EXTENDED RELEASE ORAL at 05:27

## 2021-06-16 RX ADMIN — SODIUM CHLORIDE 3 MILLILITER(S): 9 INJECTION INTRAMUSCULAR; INTRAVENOUS; SUBCUTANEOUS at 13:17

## 2021-06-16 RX ADMIN — Medication 81 MILLIGRAM(S): at 13:14

## 2021-06-16 RX ADMIN — Medication 4 MILLIGRAM(S): at 07:03

## 2021-06-16 RX ADMIN — HEPARIN SODIUM 5000 UNIT(S): 5000 INJECTION INTRAVENOUS; SUBCUTANEOUS at 05:27

## 2021-06-16 RX ADMIN — SODIUM CHLORIDE 3 MILLILITER(S): 9 INJECTION INTRAMUSCULAR; INTRAVENOUS; SUBCUTANEOUS at 00:00

## 2021-06-16 RX ADMIN — Medication 4 MILLIGRAM(S): at 13:14

## 2021-06-16 RX ADMIN — Medication 4 MILLIGRAM(S): at 11:23

## 2021-06-16 RX ADMIN — HEPARIN SODIUM 5000 UNIT(S): 5000 INJECTION INTRAVENOUS; SUBCUTANEOUS at 13:14

## 2021-06-16 RX ADMIN — SODIUM CHLORIDE 3 MILLILITER(S): 9 INJECTION INTRAMUSCULAR; INTRAVENOUS; SUBCUTANEOUS at 13:18

## 2021-06-16 RX ADMIN — CARVEDILOL PHOSPHATE 25 MILLIGRAM(S): 80 CAPSULE, EXTENDED RELEASE ORAL at 18:56

## 2021-06-16 RX ADMIN — HEPARIN SODIUM 5000 UNIT(S): 5000 INJECTION INTRAVENOUS; SUBCUTANEOUS at 22:00

## 2021-06-16 RX ADMIN — PANTOPRAZOLE SODIUM 40 MILLIGRAM(S): 20 TABLET, DELAYED RELEASE ORAL at 06:25

## 2021-06-16 RX ADMIN — SODIUM CHLORIDE 3 MILLILITER(S): 9 INJECTION INTRAMUSCULAR; INTRAVENOUS; SUBCUTANEOUS at 05:28

## 2021-06-16 RX ADMIN — POLYETHYLENE GLYCOL 3350 17 GRAM(S): 17 POWDER, FOR SOLUTION ORAL at 13:14

## 2021-06-16 RX ADMIN — SODIUM CHLORIDE 3 MILLILITER(S): 9 INJECTION INTRAMUSCULAR; INTRAVENOUS; SUBCUTANEOUS at 18:57

## 2021-06-16 NOTE — PROGRESS NOTE ADULT - SUBJECTIVE AND OBJECTIVE BOX
Patient discussed on morning rounds with Dr. Yan      Operation / Date: 6/7/2021 Aortic root, ascending, hemiarch replacement PFO closure.   RTOR POD#0 for bleeding .    SUBJECTIVE ASSESSMENT:  Patient seen this morning at bedside, states he still is feeling weak but a little stronger compared to yesterday. He states he ambulated twice yesterday and is willing to ambulate at least 5 times today. He had a normal bowel movement yesterday. He continues to use the IS (around 500) and is agreeable to use it 10x/hour today. Patient is requesting discharge to [facility] ([place] later this week.     Vital Signs Last 24 Hrs  T(C): 36.8 (16 Jun 2021 05:01), Max: 36.8 (15 Tan 2021 09:00)  T(F): 98.2 (16 Jun 2021 05:01), Max: 98.2 (15 Tan 2021 09:00)  HR: 66 (16 Jun 2021 05:07) (61 - 70)  BP: 158/89 (16 Jun 2021 05:07) (124/73 - 166/85)  BP(mean): 118 (16 Jun 2021 05:07) (93 - 121)  RR: 16 (16 Jun 2021 05:07) (16 - 34)  SpO2: 99% (16 Jun 2021 05:07) (95% - 99%)  I&O's Detail    15 Tan 2021 07:01  -  16 Jun 2021 07:00  --------------------------------------------------------  IN:    Oral Fluid: 300 mL  Total IN: 300 mL    OUT:    Voided (mL): 865 mL  Total OUT: 865 mL    Total NET: -565 mL    CHEST TUBE:  No  BRET DRAIN: No  EPICARDIAL WIRES: Yes  TIE DOWNS: No  KEMP: No    PHYSICAL EXAM:    General: Patient sitting comfortably in a chair, no acute distress     Neurological: Alert and oriented. No focal neurological deficits     Cardiovascular: S1S2, RRR, no murmurs appreciated on exam     Respiratory: Scattered rhonchi appreciated on right side. Left side clear to ausculation     Gastrointestinal: Abdomen soft, non tender, non distended     Extremities: Warm and well perfused. Trace  or calf tenderness     Vascular: Peripheral pulses 2+ bilaterally     Incision Sites:     LABS:                        9.2    11.41 )-----------( 314      ( 16 Jun 2021 08:05 )             28.5       COUMADIN:  Yes/No. REASON: .        06-16    x   |  99  |  22  ----------------------------<  94  4.1   |  22  |  1.15    Ca    8.7      16 Jun 2021 08:05  Phos  3.4     06-15  Mg     2.1     06-16    TPro  6.0  /  Alb  3.1<L>  /  TBili  0.5  /  DBili  x   /  AST  17  /  ALT  26  /  AlkPhos  57  06-15          MEDICATIONS  (STANDING):  aMIOdarone    Tablet 200 milliGRAM(s) Oral daily  aspirin enteric coated 81 milliGRAM(s) Oral daily  atorvastatin 40 milliGRAM(s) Oral at bedtime  carvedilol 25 milliGRAM(s) Oral every 12 hours  furosemide    Tablet 40 milliGRAM(s) Oral daily  heparin   Injectable 5000 Unit(s) SubCutaneous every 8 hours  pantoprazole    Tablet 40 milliGRAM(s) Oral before breakfast  polyethylene glycol 3350 17 Gram(s) Oral daily  potassium chloride    Tablet ER 20 milliEquivalent(s) Oral daily  sertraline 25 milliGRAM(s) Oral daily  sodium chloride 0.9% lock flush 3 milliLiter(s) IV Push every 8 hours  sodium chloride 3%  Inhalation 3 milliLiter(s) Inhalation every 6 hours  testosterone patch 4 mG/24 Hr(s) 4 milliGRAM(s) Transdermal daily    MEDICATIONS  (PRN):  acetaminophen   Tablet .. 650 milliGRAM(s) Oral every 6 hours PRN Mild Pain (1 - 3)  bisacodyl Suppository 10 milliGRAM(s) Rectal daily PRN Constipation  levalbuterol Inhalation 0.63 milliGRAM(s) Inhalation every 6 hours PRN Shortness of breath or wheeze  oxyCODONE    IR 5 milliGRAM(s) Oral every 4 hours PRN Moderate Pain (4 - 6)        RADIOLOGY & ADDITIONAL TESTS:     Patient discussed on morning rounds with Dr. Yan      Operation / Date: 6/7/2021 Aortic root, ascending, hemiarch replacement PFO closure.   RTOR POD#0 for bleeding .    SUBJECTIVE ASSESSMENT:  Patient seen this morning at bedside, states he still is feeling weak but a little stronger compared to yesterday. He states he ambulated twice yesterday and is willing to ambulate at least 5 times today. He had a normal bowel movement yesterday. He continues to use the IS (around 500) and is agreeable to use it 10x/hour today. Patient is requesting discharge to [facility] later this week.     Vital Signs Last 24 Hrs  T(C): 36.8 (16 Jun 2021 05:01), Max: 36.8 (15 Tan 2021 09:00)  T(F): 98.2 (16 Jun 2021 05:01), Max: 98.2 (15 Tan 2021 09:00)  HR: 66 (16 Jun 2021 05:07) (61 - 70)  BP: 158/89 (16 Jun 2021 05:07) (124/73 - 166/85)  BP(mean): 118 (16 Jun 2021 05:07) (93 - 121)  RR: 16 (16 Jun 2021 05:07) (16 - 34)  SpO2: 99% (16 Jun 2021 05:07) (95% - 99%)  I&O's Detail    15 Tan 2021 07:01  -  16 Jun 2021 07:00  --------------------------------------------------------  IN:    Oral Fluid: 300 mL  Total IN: 300 mL    OUT:    Voided (mL): 865 mL  Total OUT: 865 mL    Total NET: -565 mL    CHEST TUBE:  No  BRET DRAIN: No  EPICARDIAL WIRES: Yes  TIE DOWNS: No  KEMP: No    PHYSICAL EXAM:    General: Patient sitting comfortably in a chair, no acute distress     Neurological: Alert and oriented. No focal neurological deficits     Cardiovascular: S1S2, RRR, no murmurs appreciated on exam     Respiratory: Scattered rhonchi appreciated on right side. Left side clear to ausculation     Gastrointestinal: Abdomen soft, non tender, non distended     Extremities: Warm and well perfused. Trace bilateral LE edema, no calf tenderness     Vascular: Peripheral pulses 2+ bilaterally     Incision Sites:  Sternotomy incision C/D/I , no drainage or surrounding erythema    LABS:                        9.2    11.41 )-----------( 314      ( 16 Jun 2021 08:05 )             28.5       COUMADIN:  No    06-16    x   |  99  |  22  ----------------------------<  94  4.1   |  22  |  1.15    Ca    8.7      16 Jun 2021 08:05  Phos  3.4     06-15  Mg     2.1     06-16    TPro  6.0  /  Alb  3.1<L>  /  TBili  0.5  /  DBili  x   /  AST  17  /  ALT  26  /  AlkPhos  57  06-15      MEDICATIONS  (STANDING):  aMIOdarone    Tablet 200 milliGRAM(s) Oral daily  aspirin enteric coated 81 milliGRAM(s) Oral daily  atorvastatin 40 milliGRAM(s) Oral at bedtime  carvedilol 25 milliGRAM(s) Oral every 12 hours  furosemide    Tablet 40 milliGRAM(s) Oral daily  heparin   Injectable 5000 Unit(s) SubCutaneous every 8 hours  pantoprazole    Tablet 40 milliGRAM(s) Oral before breakfast  polyethylene glycol 3350 17 Gram(s) Oral daily  potassium chloride    Tablet ER 20 milliEquivalent(s) Oral daily  sertraline 25 milliGRAM(s) Oral daily  testosterone patch 4 mG/24 Hr(s) 4 milliGRAM(s) Transdermal daily    MEDICATIONS  (PRN):  acetaminophen   Tablet .. 650 milliGRAM(s) Oral every 6 hours PRN Mild Pain (1 - 3)  bisacodyl Suppository 10 milliGRAM(s) Rectal daily PRN Constipation  levalbuterol Inhalation 0.63 milliGRAM(s) Inhalation every 6 hours PRN Shortness of breath or wheeze  oxyCODONE    IR 5 milliGRAM(s) Oral every 4 hours PRN Moderate Pain (4 - 6)        RADIOLOGY & ADDITIONAL TESTS:

## 2021-06-17 LAB
ALBUMIN SERPL ELPH-MCNC: 3 G/DL — LOW (ref 3.3–5)
ALP SERPL-CCNC: 60 U/L — SIGNIFICANT CHANGE UP (ref 40–120)
ALT FLD-CCNC: 23 U/L — SIGNIFICANT CHANGE UP (ref 10–45)
ANION GAP SERPL CALC-SCNC: 10 MMOL/L — SIGNIFICANT CHANGE UP (ref 5–17)
APTT BLD: 28.8 SEC — SIGNIFICANT CHANGE UP (ref 27.5–35.5)
AST SERPL-CCNC: 13 U/L — SIGNIFICANT CHANGE UP (ref 10–40)
BILIRUB SERPL-MCNC: 0.5 MG/DL — SIGNIFICANT CHANGE UP (ref 0.2–1.2)
BUN SERPL-MCNC: 16 MG/DL — SIGNIFICANT CHANGE UP (ref 7–23)
CALCIUM SERPL-MCNC: 8.4 MG/DL — SIGNIFICANT CHANGE UP (ref 8.4–10.5)
CHLORIDE SERPL-SCNC: 100 MMOL/L — SIGNIFICANT CHANGE UP (ref 96–108)
CO2 SERPL-SCNC: 23 MMOL/L — SIGNIFICANT CHANGE UP (ref 22–31)
CREAT SERPL-MCNC: 1.1 MG/DL — SIGNIFICANT CHANGE UP (ref 0.5–1.3)
GLUCOSE SERPL-MCNC: 109 MG/DL — HIGH (ref 70–99)
HCT VFR BLD CALC: 28.4 % — LOW (ref 39–50)
HGB BLD-MCNC: 9.2 G/DL — LOW (ref 13–17)
INR BLD: 1.1 — SIGNIFICANT CHANGE UP (ref 0.88–1.16)
MAGNESIUM SERPL-MCNC: 2.2 MG/DL — SIGNIFICANT CHANGE UP (ref 1.6–2.6)
MCHC RBC-ENTMCNC: 28.1 PG — SIGNIFICANT CHANGE UP (ref 27–34)
MCHC RBC-ENTMCNC: 32.4 GM/DL — SIGNIFICANT CHANGE UP (ref 32–36)
MCV RBC AUTO: 86.9 FL — SIGNIFICANT CHANGE UP (ref 80–100)
NRBC # BLD: 0 /100 WBCS — SIGNIFICANT CHANGE UP (ref 0–0)
PHOSPHATE SERPL-MCNC: 3.2 MG/DL — SIGNIFICANT CHANGE UP (ref 2.5–4.5)
PLATELET # BLD AUTO: 358 K/UL — SIGNIFICANT CHANGE UP (ref 150–400)
POTASSIUM SERPL-MCNC: 4.4 MMOL/L — SIGNIFICANT CHANGE UP (ref 3.5–5.3)
POTASSIUM SERPL-SCNC: 4.4 MMOL/L — SIGNIFICANT CHANGE UP (ref 3.5–5.3)
PROT SERPL-MCNC: 6.8 G/DL — SIGNIFICANT CHANGE UP (ref 6–8.3)
PROTHROM AB SERPL-ACNC: 13.1 SEC — SIGNIFICANT CHANGE UP (ref 10.6–13.6)
RBC # BLD: 3.27 M/UL — LOW (ref 4.2–5.8)
RBC # FLD: 15.5 % — HIGH (ref 10.3–14.5)
SODIUM SERPL-SCNC: 133 MMOL/L — LOW (ref 135–145)
WBC # BLD: 13.39 K/UL — HIGH (ref 3.8–10.5)
WBC # FLD AUTO: 13.39 K/UL — HIGH (ref 3.8–10.5)

## 2021-06-17 PROCEDURE — 71045 X-RAY EXAM CHEST 1 VIEW: CPT | Mod: 26

## 2021-06-17 RX ORDER — ACETYLCYSTEINE 200 MG/ML
4 VIAL (ML) MISCELLANEOUS THREE TIMES A DAY
Refills: 0 | Status: COMPLETED | OUTPATIENT
Start: 2021-06-17 | End: 2021-06-17

## 2021-06-17 RX ORDER — HYDRALAZINE HCL 50 MG
25 TABLET ORAL EVERY 8 HOURS
Refills: 0 | Status: DISCONTINUED | OUTPATIENT
Start: 2021-06-17 | End: 2021-06-18

## 2021-06-17 RX ORDER — FUROSEMIDE 40 MG
20 TABLET ORAL ONCE
Refills: 0 | Status: COMPLETED | OUTPATIENT
Start: 2021-06-17 | End: 2021-06-17

## 2021-06-17 RX ADMIN — SERTRALINE 25 MILLIGRAM(S): 25 TABLET, FILM COATED ORAL at 10:38

## 2021-06-17 RX ADMIN — PANTOPRAZOLE SODIUM 40 MILLIGRAM(S): 20 TABLET, DELAYED RELEASE ORAL at 06:10

## 2021-06-17 RX ADMIN — Medication 25 MILLIGRAM(S): at 07:51

## 2021-06-17 RX ADMIN — SODIUM CHLORIDE 3 MILLILITER(S): 9 INJECTION INTRAMUSCULAR; INTRAVENOUS; SUBCUTANEOUS at 13:02

## 2021-06-17 RX ADMIN — HEPARIN SODIUM 5000 UNIT(S): 5000 INJECTION INTRAVENOUS; SUBCUTANEOUS at 13:09

## 2021-06-17 RX ADMIN — Medication 4 MILLILITER(S): at 21:24

## 2021-06-17 RX ADMIN — Medication 4 MILLILITER(S): at 10:38

## 2021-06-17 RX ADMIN — OXYCODONE HYDROCHLORIDE 5 MILLIGRAM(S): 5 TABLET ORAL at 07:04

## 2021-06-17 RX ADMIN — AMIODARONE HYDROCHLORIDE 200 MILLIGRAM(S): 400 TABLET ORAL at 05:45

## 2021-06-17 RX ADMIN — Medication 4 MILLIGRAM(S): at 07:28

## 2021-06-17 RX ADMIN — POLYETHYLENE GLYCOL 3350 17 GRAM(S): 17 POWDER, FOR SOLUTION ORAL at 10:38

## 2021-06-17 RX ADMIN — SODIUM CHLORIDE 3 MILLILITER(S): 9 INJECTION INTRAMUSCULAR; INTRAVENOUS; SUBCUTANEOUS at 17:30

## 2021-06-17 RX ADMIN — Medication 20 MILLIEQUIVALENT(S): at 10:38

## 2021-06-17 RX ADMIN — OXYCODONE HYDROCHLORIDE 5 MILLIGRAM(S): 5 TABLET ORAL at 08:17

## 2021-06-17 RX ADMIN — Medication 4 MILLIGRAM(S): at 10:38

## 2021-06-17 RX ADMIN — Medication 81 MILLIGRAM(S): at 10:38

## 2021-06-17 RX ADMIN — Medication 4 MILLILITER(S): at 17:30

## 2021-06-17 RX ADMIN — HEPARIN SODIUM 5000 UNIT(S): 5000 INJECTION INTRAVENOUS; SUBCUTANEOUS at 21:24

## 2021-06-17 RX ADMIN — SODIUM CHLORIDE 3 MILLILITER(S): 9 INJECTION INTRAMUSCULAR; INTRAVENOUS; SUBCUTANEOUS at 07:29

## 2021-06-17 RX ADMIN — ATORVASTATIN CALCIUM 40 MILLIGRAM(S): 80 TABLET, FILM COATED ORAL at 21:23

## 2021-06-17 RX ADMIN — CARVEDILOL PHOSPHATE 25 MILLIGRAM(S): 80 CAPSULE, EXTENDED RELEASE ORAL at 17:31

## 2021-06-17 RX ADMIN — SODIUM CHLORIDE 3 MILLILITER(S): 9 INJECTION INTRAMUSCULAR; INTRAVENOUS; SUBCUTANEOUS at 21:04

## 2021-06-17 RX ADMIN — Medication 20 MILLIGRAM(S): at 10:38

## 2021-06-17 RX ADMIN — Medication 25 MILLIGRAM(S): at 13:09

## 2021-06-17 RX ADMIN — OXYCODONE HYDROCHLORIDE 5 MILLIGRAM(S): 5 TABLET ORAL at 14:15

## 2021-06-17 RX ADMIN — Medication 40 MILLIGRAM(S): at 05:45

## 2021-06-17 RX ADMIN — OXYCODONE HYDROCHLORIDE 5 MILLIGRAM(S): 5 TABLET ORAL at 13:09

## 2021-06-17 RX ADMIN — Medication 25 MILLIGRAM(S): at 21:23

## 2021-06-17 RX ADMIN — SODIUM CHLORIDE 3 MILLILITER(S): 9 INJECTION INTRAMUSCULAR; INTRAVENOUS; SUBCUTANEOUS at 05:42

## 2021-06-17 RX ADMIN — CARVEDILOL PHOSPHATE 25 MILLIGRAM(S): 80 CAPSULE, EXTENDED RELEASE ORAL at 05:45

## 2021-06-17 RX ADMIN — HEPARIN SODIUM 5000 UNIT(S): 5000 INJECTION INTRAVENOUS; SUBCUTANEOUS at 05:45

## 2021-06-17 NOTE — PROGRESS NOTE ADULT - SUBJECTIVE AND OBJECTIVE BOX
Patient discussed on morning rounds with Dr. Yan     Operation / Date: 6/7/2021 Aortic root, ascending, hemiarch replacement PFO closure.   RTOR POD#0 for bleeding .    SUBJECTIVE ASSESSMENT:  Patient seen this morning at bedside, states he is continuing to improve every day and denies any chest pain or SOB. Patient ambulated only once yesterday and is agreeable to ambulate at least 4 times today. Patient already ambulated with PT this morning.     Vital Signs Last 24 Hrs  T(C): 36.7 (17 Jun 2021 09:22), Max: 36.8 (16 Jun 2021 13:37)  T(F): 98 (17 Jun 2021 09:22), Max: 98.2 (16 Jun 2021 13:37)  HR: 64 (17 Jun 2021 09:00) (61 - 68)  BP: 113/61 (17 Jun 2021 09:00) (113/61 - 172/102)  BP(mean): 101 (17 Jun 2021 07:38) (97 - 132)  RR: 20 (17 Jun 2021 09:00) (16 - 22)  SpO2: 97% (17 Jun 2021 09:00) (94% - 99%)  I&O's Detail    16 Jun 2021 07:01  -  17 Jun 2021 07:00  --------------------------------------------------------  IN:    Oral Fluid: 245 mL  Total IN: 245 mL    OUT:    Voided (mL): 850 mL  Total OUT: 850 mL    Total NET: -605 mL    CHEST TUBE:  No  BRET DRAIN: No  EPICARDIAL WIRES: Yes  TIE DOWNS: No  KEMP: No    PHYSICAL EXAM:    General: Patient sitting comfortably in a chair, no acute distress     Neurological: Alert and oriented. No focal neurological deficits     Cardiovascular: S1S2, RRR, no murmurs appreciated on exam     Respiratory: Scattered rhonchi appreciated bilaterally.     Gastrointestinal: Abdomen soft, non tender, non distended     Extremities: Warm and well perfused. Trace bilateral LE edema, no calf tenderness     Vascular: Peripheral pulses 2+ bilaterally     Incision Sites:  Sternotomy incision C/D/I , no drainage or surrounding erythema    LABS:                        9.2    13.39 )-----------( 358      ( 17 Jun 2021 09:05 )             28.4       COUMADIN:  No    PT/INR - ( 17 Jun 2021 09:05 )   PT: 13.1 sec;   INR: 1.10          PTT - ( 17 Jun 2021 09:05 )  PTT:28.8 sec    06-17    133<L>  |  100  |  16  ----------------------------<  109<H>  4.4   |  23  |  1.10    Ca    8.4      17 Jun 2021 09:05  Phos  3.2     06-17  Mg     2.2     06-17    TPro  6.8  /  Alb  3.0<L>  /  TBili  0.5  /  DBili  x   /  AST  13  /  ALT  23  /  AlkPhos  60  06-17          MEDICATIONS  (STANDING):  acetylcysteine 20%  Inhalation 4 milliLiter(s) Inhalation three times a day  aMIOdarone    Tablet 200 milliGRAM(s) Oral daily  aspirin enteric coated 81 milliGRAM(s) Oral daily  atorvastatin 40 milliGRAM(s) Oral at bedtime  carvedilol 25 milliGRAM(s) Oral every 12 hours  furosemide    Tablet 40 milliGRAM(s) Oral daily  heparin   Injectable 5000 Unit(s) SubCutaneous every 8 hours  hydrALAZINE 25 milliGRAM(s) Oral every 8 hours  pantoprazole    Tablet 40 milliGRAM(s) Oral before breakfast  polyethylene glycol 3350 17 Gram(s) Oral daily  potassium chloride    Tablet ER 20 milliEquivalent(s) Oral daily  sertraline 25 milliGRAM(s) Oral daily    MEDICATIONS  (PRN):  acetaminophen   Tablet .. 650 milliGRAM(s) Oral every 6 hours PRN Mild Pain (1 - 3)  bisacodyl Suppository 10 milliGRAM(s) Rectal daily PRN Constipation  levalbuterol Inhalation 0.63 milliGRAM(s) Inhalation every 6 hours PRN Shortness of breath or wheeze  oxyCODONE    IR 5 milliGRAM(s) Oral every 4 hours PRN Moderate Pain (4 - 6)        RADIOLOGY & ADDITIONAL TESTS:

## 2021-06-18 ENCOUNTER — TRANSCRIPTION ENCOUNTER (OUTPATIENT)
Age: 61
End: 2021-06-18

## 2021-06-18 VITALS
DIASTOLIC BLOOD PRESSURE: 80 MMHG | SYSTOLIC BLOOD PRESSURE: 143 MMHG | HEART RATE: 64 BPM | OXYGEN SATURATION: 98 % | RESPIRATION RATE: 18 BRPM

## 2021-06-18 LAB
ALBUMIN SERPL ELPH-MCNC: 3 G/DL — LOW (ref 3.3–5)
ALP SERPL-CCNC: 57 U/L — SIGNIFICANT CHANGE UP (ref 40–120)
ALT FLD-CCNC: 19 U/L — SIGNIFICANT CHANGE UP (ref 10–45)
ANION GAP SERPL CALC-SCNC: 10 MMOL/L — SIGNIFICANT CHANGE UP (ref 5–17)
AST SERPL-CCNC: 12 U/L — SIGNIFICANT CHANGE UP (ref 10–40)
BILIRUB SERPL-MCNC: 0.4 MG/DL — SIGNIFICANT CHANGE UP (ref 0.2–1.2)
BUN SERPL-MCNC: 17 MG/DL — SIGNIFICANT CHANGE UP (ref 7–23)
CALCIUM SERPL-MCNC: 8.6 MG/DL — SIGNIFICANT CHANGE UP (ref 8.4–10.5)
CHLORIDE SERPL-SCNC: 104 MMOL/L — SIGNIFICANT CHANGE UP (ref 96–108)
CO2 SERPL-SCNC: 22 MMOL/L — SIGNIFICANT CHANGE UP (ref 22–31)
CREAT SERPL-MCNC: 1.25 MG/DL — SIGNIFICANT CHANGE UP (ref 0.5–1.3)
GLUCOSE SERPL-MCNC: 106 MG/DL — HIGH (ref 70–99)
HCT VFR BLD CALC: 27.9 % — LOW (ref 39–50)
HGB BLD-MCNC: 8.9 G/DL — LOW (ref 13–17)
MAGNESIUM SERPL-MCNC: 2.3 MG/DL — SIGNIFICANT CHANGE UP (ref 1.6–2.6)
MCHC RBC-ENTMCNC: 28 PG — SIGNIFICANT CHANGE UP (ref 27–34)
MCHC RBC-ENTMCNC: 31.9 GM/DL — LOW (ref 32–36)
MCV RBC AUTO: 87.7 FL — SIGNIFICANT CHANGE UP (ref 80–100)
NRBC # BLD: 0 /100 WBCS — SIGNIFICANT CHANGE UP (ref 0–0)
PLATELET # BLD AUTO: 362 K/UL — SIGNIFICANT CHANGE UP (ref 150–400)
POTASSIUM SERPL-MCNC: 4.6 MMOL/L — SIGNIFICANT CHANGE UP (ref 3.5–5.3)
POTASSIUM SERPL-SCNC: 4.6 MMOL/L — SIGNIFICANT CHANGE UP (ref 3.5–5.3)
PROT SERPL-MCNC: 6.5 G/DL — SIGNIFICANT CHANGE UP (ref 6–8.3)
RBC # BLD: 3.18 M/UL — LOW (ref 4.2–5.8)
RBC # FLD: 15.5 % — HIGH (ref 10.3–14.5)
SODIUM SERPL-SCNC: 136 MMOL/L — SIGNIFICANT CHANGE UP (ref 135–145)
WBC # BLD: 12.04 K/UL — HIGH (ref 3.8–10.5)
WBC # FLD AUTO: 12.04 K/UL — HIGH (ref 3.8–10.5)

## 2021-06-18 PROCEDURE — 36415 COLL VENOUS BLD VENIPUNCTURE: CPT

## 2021-06-18 PROCEDURE — 97110 THERAPEUTIC EXERCISES: CPT

## 2021-06-18 PROCEDURE — 82330 ASSAY OF CALCIUM: CPT

## 2021-06-18 PROCEDURE — 97161 PT EVAL LOW COMPLEX 20 MIN: CPT

## 2021-06-18 PROCEDURE — P9045: CPT

## 2021-06-18 PROCEDURE — C1769: CPT

## 2021-06-18 PROCEDURE — 86803 HEPATITIS C AB TEST: CPT

## 2021-06-18 PROCEDURE — 86900 BLOOD TYPING SEROLOGIC ABO: CPT

## 2021-06-18 PROCEDURE — 94640 AIRWAY INHALATION TREATMENT: CPT

## 2021-06-18 PROCEDURE — P9059: CPT

## 2021-06-18 PROCEDURE — P9012: CPT

## 2021-06-18 PROCEDURE — 86850 RBC ANTIBODY SCREEN: CPT

## 2021-06-18 PROCEDURE — P9037: CPT

## 2021-06-18 PROCEDURE — 86706 HEP B SURFACE ANTIBODY: CPT

## 2021-06-18 PROCEDURE — 86704 HEP B CORE ANTIBODY TOTAL: CPT

## 2021-06-18 PROCEDURE — C1889: CPT

## 2021-06-18 PROCEDURE — 85610 PROTHROMBIN TIME: CPT

## 2021-06-18 PROCEDURE — 84100 ASSAY OF PHOSPHORUS: CPT

## 2021-06-18 PROCEDURE — 86708 HEPATITIS A ANTIBODY: CPT

## 2021-06-18 PROCEDURE — 84132 ASSAY OF SERUM POTASSIUM: CPT

## 2021-06-18 PROCEDURE — 83605 ASSAY OF LACTIC ACID: CPT

## 2021-06-18 PROCEDURE — 82803 BLOOD GASES ANY COMBINATION: CPT

## 2021-06-18 PROCEDURE — C1894: CPT

## 2021-06-18 PROCEDURE — 88305 TISSUE EXAM BY PATHOLOGIST: CPT

## 2021-06-18 PROCEDURE — 36430 TRANSFUSION BLD/BLD COMPNT: CPT

## 2021-06-18 PROCEDURE — 86891 AUTOLOGOUS BLOOD OP SALVAGE: CPT

## 2021-06-18 PROCEDURE — 80048 BASIC METABOLIC PNL TOTAL CA: CPT

## 2021-06-18 PROCEDURE — 97116 GAIT TRAINING THERAPY: CPT

## 2021-06-18 PROCEDURE — 94002 VENT MGMT INPAT INIT DAY: CPT

## 2021-06-18 PROCEDURE — P9047: CPT

## 2021-06-18 PROCEDURE — 84295 ASSAY OF SERUM SODIUM: CPT

## 2021-06-18 PROCEDURE — 87389 HIV-1 AG W/HIV-1&-2 AB AG IA: CPT

## 2021-06-18 PROCEDURE — 97164 PT RE-EVAL EST PLAN CARE: CPT

## 2021-06-18 PROCEDURE — 93005 ELECTROCARDIOGRAM TRACING: CPT

## 2021-06-18 PROCEDURE — 86923 COMPATIBILITY TEST ELECTRIC: CPT

## 2021-06-18 PROCEDURE — 87340 HEPATITIS B SURFACE AG IA: CPT

## 2021-06-18 PROCEDURE — 71045 X-RAY EXAM CHEST 1 VIEW: CPT

## 2021-06-18 PROCEDURE — P9016: CPT

## 2021-06-18 PROCEDURE — 85027 COMPLETE CBC AUTOMATED: CPT

## 2021-06-18 PROCEDURE — 71045 X-RAY EXAM CHEST 1 VIEW: CPT | Mod: 26

## 2021-06-18 PROCEDURE — 85025 COMPLETE CBC W/AUTO DIFF WBC: CPT

## 2021-06-18 PROCEDURE — 83735 ASSAY OF MAGNESIUM: CPT

## 2021-06-18 PROCEDURE — 87070 CULTURE OTHR SPECIMN AEROBIC: CPT

## 2021-06-18 PROCEDURE — 86965 POOLING BLOOD PLATELETS: CPT

## 2021-06-18 PROCEDURE — 82962 GLUCOSE BLOOD TEST: CPT

## 2021-06-18 PROCEDURE — 97530 THERAPEUTIC ACTIVITIES: CPT

## 2021-06-18 PROCEDURE — 86705 HEP B CORE ANTIBODY IGM: CPT

## 2021-06-18 PROCEDURE — 93321 DOPPLER ECHO F-UP/LMTD STD: CPT

## 2021-06-18 PROCEDURE — C1768: CPT

## 2021-06-18 PROCEDURE — 93306 TTE W/DOPPLER COMPLETE: CPT

## 2021-06-18 PROCEDURE — 93306 TTE W/DOPPLER COMPLETE: CPT | Mod: 26

## 2021-06-18 PROCEDURE — 85730 THROMBOPLASTIN TIME PARTIAL: CPT

## 2021-06-18 PROCEDURE — 86901 BLOOD TYPING SEROLOGIC RH(D): CPT

## 2021-06-18 PROCEDURE — 80053 COMPREHEN METABOLIC PANEL: CPT

## 2021-06-18 PROCEDURE — 86709 HEPATITIS A IGM ANTIBODY: CPT

## 2021-06-18 RX ORDER — AMIODARONE HYDROCHLORIDE 400 MG/1
1 TABLET ORAL
Qty: 0 | Refills: 0 | DISCHARGE
Start: 2021-06-18

## 2021-06-18 RX ORDER — ACETAMINOPHEN 500 MG
2 TABLET ORAL
Qty: 0 | Refills: 0 | DISCHARGE
Start: 2021-06-18

## 2021-06-18 RX ORDER — HYDRALAZINE HCL 50 MG
0 TABLET ORAL
Qty: 0 | Refills: 0 | DISCHARGE

## 2021-06-18 RX ORDER — HYDRALAZINE HCL 50 MG
1 TABLET ORAL
Qty: 0 | Refills: 0 | DISCHARGE
Start: 2021-06-18

## 2021-06-18 RX ORDER — OXYCODONE HYDROCHLORIDE 5 MG/1
1 TABLET ORAL
Qty: 0 | Refills: 0 | DISCHARGE
Start: 2021-06-18

## 2021-06-18 RX ORDER — POLYETHYLENE GLYCOL 3350 17 G/17G
17 POWDER, FOR SOLUTION ORAL
Qty: 0 | Refills: 0 | DISCHARGE
Start: 2021-06-18

## 2021-06-18 RX ORDER — POTASSIUM CHLORIDE 20 MEQ
1 PACKET (EA) ORAL
Qty: 0 | Refills: 0 | DISCHARGE
Start: 2021-06-18

## 2021-06-18 RX ORDER — ATORVASTATIN CALCIUM 80 MG/1
1 TABLET, FILM COATED ORAL
Qty: 0 | Refills: 0 | DISCHARGE
Start: 2021-06-18

## 2021-06-18 RX ORDER — FUROSEMIDE 40 MG
1 TABLET ORAL
Qty: 0 | Refills: 0 | DISCHARGE
Start: 2021-06-18

## 2021-06-18 RX ORDER — SERTRALINE 25 MG/1
1 TABLET, FILM COATED ORAL
Qty: 0 | Refills: 0 | DISCHARGE
Start: 2021-06-18

## 2021-06-18 RX ORDER — PANTOPRAZOLE SODIUM 20 MG/1
1 TABLET, DELAYED RELEASE ORAL
Qty: 0 | Refills: 0 | DISCHARGE
Start: 2021-06-18

## 2021-06-18 RX ADMIN — SODIUM CHLORIDE 3 MILLILITER(S): 9 INJECTION INTRAMUSCULAR; INTRAVENOUS; SUBCUTANEOUS at 15:22

## 2021-06-18 RX ADMIN — Medication 25 MILLIGRAM(S): at 05:27

## 2021-06-18 RX ADMIN — SODIUM CHLORIDE 3 MILLILITER(S): 9 INJECTION INTRAMUSCULAR; INTRAVENOUS; SUBCUTANEOUS at 05:25

## 2021-06-18 RX ADMIN — CARVEDILOL PHOSPHATE 25 MILLIGRAM(S): 80 CAPSULE, EXTENDED RELEASE ORAL at 05:27

## 2021-06-18 RX ADMIN — Medication 20 MILLIEQUIVALENT(S): at 11:37

## 2021-06-18 RX ADMIN — Medication 40 MILLIGRAM(S): at 05:27

## 2021-06-18 RX ADMIN — Medication 25 MILLIGRAM(S): at 21:35

## 2021-06-18 RX ADMIN — Medication 25 MILLIGRAM(S): at 15:26

## 2021-06-18 RX ADMIN — HEPARIN SODIUM 5000 UNIT(S): 5000 INJECTION INTRAVENOUS; SUBCUTANEOUS at 21:35

## 2021-06-18 RX ADMIN — HEPARIN SODIUM 5000 UNIT(S): 5000 INJECTION INTRAVENOUS; SUBCUTANEOUS at 15:26

## 2021-06-18 RX ADMIN — CARVEDILOL PHOSPHATE 25 MILLIGRAM(S): 80 CAPSULE, EXTENDED RELEASE ORAL at 17:09

## 2021-06-18 RX ADMIN — ATORVASTATIN CALCIUM 40 MILLIGRAM(S): 80 TABLET, FILM COATED ORAL at 21:35

## 2021-06-18 RX ADMIN — PANTOPRAZOLE SODIUM 40 MILLIGRAM(S): 20 TABLET, DELAYED RELEASE ORAL at 05:27

## 2021-06-18 RX ADMIN — SERTRALINE 25 MILLIGRAM(S): 25 TABLET, FILM COATED ORAL at 11:37

## 2021-06-18 RX ADMIN — AMIODARONE HYDROCHLORIDE 200 MILLIGRAM(S): 400 TABLET ORAL at 05:27

## 2021-06-18 RX ADMIN — Medication 81 MILLIGRAM(S): at 11:37

## 2021-06-18 RX ADMIN — HEPARIN SODIUM 5000 UNIT(S): 5000 INJECTION INTRAVENOUS; SUBCUTANEOUS at 05:27

## 2021-06-18 RX ADMIN — POLYETHYLENE GLYCOL 3350 17 GRAM(S): 17 POWDER, FOR SOLUTION ORAL at 11:37

## 2021-06-18 NOTE — PROGRESS NOTE ADULT - SUBJECTIVE AND OBJECTIVE BOX
Patient discussed on morning rounds with Dr. Yan/Rola     Operation / Date: 6/7/2021 aortic root/ascending/hemiarch replacement with PFO closure    Surgeon: Dr. Yan    Referring Physician: Dr. Rushing    SUBJECTIVE ASSESSMENT:  Patient is complaining of still feeling weak from surgery and wants to know when he will improve.  He says he is trying to walk but feels exhausted each time he exerts himself.  He is adamant he goes to [facility] even though he has a lot of support at home, he does not feel safe going home.  He denies HA, dizziness, CP, SOB, palpitations.      Hospital Course:  61 year old male, former smoker, former alcohol and drug use (cocaine and marijuana) with PMHx of HTN, HLD, CKD stage III, TIA (2007), CAD s/p NSTEMI 1/2019, recent hospitalization for PNA and heart failure exacerbation Patient found to have severe AI with dilated aorta and was evaluated by Dr. Yan as an outpatient for his known valvular disease. He presented to [facility] on 6/7/2021 for planned procedure. Patient underwent aortic root/ascending/hemiarch replacement with PFO closure on 6/7/2021. Procedure was uncomplicated, he was transferred to CT ICU post operatively in stable condition. Immediately post op patient have increased drainage from chest tubes requiring 2 FFP, 1 Plt, 1000 FEIBA and returned to the OR POD#0 for bleeding. He arrived back to the CT ICU on epi/levo. He was extubated POD#1. POD#3 patient had post operative afib, started on amio/BB. He continued to require HFNC and Bipap alternating for hypoxia and required multiple bronchoscopies for pulmonary toilet and was started on empiric abx. On POD#6 patient ntoed to have widened QRS and EP was consulted, he was cleared to continue amio/BB for baseline RBBB. He continued to remain stable and transferred to  POD#8. While on the floor he worked with PT increasing his strength and mobility and by POD 11 he is clinically stable to be discharged to [facility].      Vital Signs Last 24 Hrs  T(C): 36.2 (18 Jun 2021 09:14), Max: 37.1 (17 Jun 2021 14:10)  T(F): 97.2 (18 Jun 2021 09:14), Max: 98.7 (17 Jun 2021 14:10)  HR: 62 (18 Jun 2021 08:30) (60 - 64)  BP: 125/92 (18 Jun 2021 08:30) (125/92 - 165/85)  BP(mean): 106 (18 Jun 2021 08:30) (96 - 117)  RR: 16 (18 Jun 2021 08:30) (16 - 20)  SpO2: 98% (18 Jun 2021 08:30) (97% - 98%)  I&O's Detail    17 Jun 2021 07:01  -  18 Jun 2021 07:00  --------------------------------------------------------  IN:    Oral Fluid: 660 mL  Total IN: 660 mL    OUT:    Voided (mL): 1130 mL  Total OUT: 1130 mL    Total NET: -470 mL      18 Jun 2021 07:01  -  18 Jun 2021 14:03  --------------------------------------------------------  IN:  Total IN: 0 mL    OUT:    Voided (mL): 475 mL  Total OUT: 475 mL    Total NET: -475 mL    EPICARDIAL WIRES REMOVED: Yes  TIE DOWNS REMOVED: Yes    PHYSICAL EXAM:    General: OOB in chair, comfortable, NAD    Neurological: A&O x 3 non focal     Cardiovascular: S1S2 RRR No M/G/R    Respiratory: CTA b/l No W/R/R    Gastrointestinal: soft, NT/ND    Extremities: No edema, no calf tenderness     Vascular: warm and well perfused    Incision Sites: MSI healing well, no drainage, no erythema     LABS:                        8.9    12.04 )-----------( 362      ( 18 Jun 2021 06:36 )             27.9       PT/INR - ( 17 Jun 2021 09:05 )   PT: 13.1 sec;   INR: 1.10          PTT - ( 17 Jun 2021 09:05 )  PTT:28.8 sec    06-18    136  |  104  |  17  ----------------------------<  106<H>  4.6   |  22  |  1.25    Ca    8.6      18 Jun 2021 06:36  Phos  3.2     06-17  Mg     2.3     06-18    TPro  6.5  /  Alb  3.0<L>  /  TBili  0.4  /  DBili  x   /  AST  12  /  ALT  19  /  AlkPhos  57  06-18      MEDICATIONS  (STANDING):  aMIOdarone    Tablet 200 milliGRAM(s) Oral daily  aspirin enteric coated 81 milliGRAM(s) Oral daily  atorvastatin 40 milliGRAM(s) Oral at bedtime  carvedilol 25 milliGRAM(s) Oral every 12 hours  furosemide    Tablet 40 milliGRAM(s) Oral daily  heparin   Injectable 5000 Unit(s) SubCutaneous every 8 hours  hydrALAZINE 25 milliGRAM(s) Oral every 8 hours  pantoprazole    Tablet 40 milliGRAM(s) Oral before breakfast  polyethylene glycol 3350 17 Gram(s) Oral daily  potassium chloride    Tablet ER 20 milliEquivalent(s) Oral daily  sertraline 25 milliGRAM(s) Oral daily  sodium chloride 0.9% lock flush 3 milliLiter(s) IV Push every 8 hours  sodium chloride 3%  Inhalation 3 milliLiter(s) Inhalation every 6 hours      Discharge CXR: < from: Xray Chest 1 View- PORTABLE-Routine (Xray Chest 1 View- PORTABLE-Routine in AM.) (06.17.21 @ 04:18) >  Findings/  impression: Stable cardiomegaly, status post median sternotomy, aortic valve replacement. Right opacity/pleural effusion, stable. Stable bony structures.    < end of copied text >      Discharge ECHO:

## 2021-06-18 NOTE — PROGRESS NOTE ADULT - PROVIDER SPECIALTY LIST ADULT
Critical Care
CT Surgery
Critical Care
CT Surgery
Critical Care

## 2021-06-18 NOTE — PROGRESS NOTE ADULT - ASSESSMENT
A/P: 61 year old male, ex-smoker (quit 2 months ago), with a past medical history of hypertension, hyperlipidemia, CKD stage 3, TIA (2007), CAD s/p NSTEMI (Jan 2019), drug and alcohol misuse (cocaine/marijuana 30 yr hx), recent hospitalization for pneumonia and heart failure who was evaluated as an outpatient for his valvular disease, dilated aorta, and coronary artery disease with NYHA Class II-III symptoms. On 6/7 he underwent an aortic root, ascending, hemiarch replacement, and PFO closure.  POD 0 RTOR for bleeding after which he remained stable and was extubated on POD 1.  ICU course further significant for aftrial fibrillation and bundle branch block with prolonged QTc.  EPS consulted, after reviewing EKGs determined rhythm was at baseline and patient was cleared for BB and short coarse of amiodarone.  He is now POD 8 and transferred from ICU to telemetry care.         Neurovascular:  -con't Tylenol/oxy for pain control.  -con't zoloft/testosterone patch    Cardiovascular: HR controlled, currently in NSR   -Con't ASA 81 mg QD  -Con't amio 200 daily and coreg 25 BID  -appreciate EPS recs  Con'statin    Respiratory: saturating well on room air  -diuresed with lasix 40 mg IVP today, start 40 mg PO tomorrow  -CXR with right base atelectasis/small effusion    GI: Stable.  -PPX with protonix  -PO Diet.  -bowel regimen     Renal / :   -Hx of CKD, BUN/Cr below baseline  -Martinez removed today, FU TOV  -Monitor renal function.  -Monitor I/O's.    Endocrine:    -No hx of thyroid disease or diabetes, no indication for insulin at this time    Hematologic:  -HSQ for DVT ppx    ID:  -monitor for post op fevers    Disposition:  -home when medically ready, anticipate thurs or friday
A/P: 61 year old male, former smoker, former alcohol and drug use (cocaine and marijuana) with PMHx of HTN, HLD, CKD stage III, TIA (2007), CAD s/p NSTEMI 1/2019, recent hospitalization for PNA and heart failure exacerbation Patient found to have severe AI with dilated aorta and was evaluated by Dr. Yan as an outpatient for his known valvular disease. He presented to [facility] on 6/7/2021 for planned procedure. Patient underwent aortic root/ascending/hemiarch replacement with PFO closure on 6/7/2021. Procedure was uncomplicated, he was transferred to CT ICU post operatively in stable condition. Immediately post op patient have increased drainage from chest tubes requiring 2 FFP, 1 Plt, 1000 FEIBA and returned to the OR POD#0 for bleeding. He arrived back to the CT ICU on epi/levo. He was extubated POD#1. POD#3 patient had post operative afib, started on amio/BB. He continued to require HFNC and Bipap alternating for hypoxia and required multiple bronchoscopies for pulmonary toliet and was started on empiric abx. On POD#6 patient ntoed to have widened QRS and EP was consulted, he was cleared to continue amio/BB for baseline RBBB. He continued to remain stable and transferred to  POD#8. No acute events overnight.     Neurovascular: Stable. Pain well controlled with current regimen.  - Continue tylenol and oxycodone   - Continue zoloft/testosterone patch for deconditioning, will discontinue on discharge.     Cardiovascular: Hemodynamically stable. HR controlled.  - Severe AI and aortic aneurysm s/p Root/ascending/hemiarch. Continue asa 81mg, atorvastatin 40mg qhs and coreg 25mg BID   - Post operative Afib, continue amiodarone 200mg daily. EP following, cleared for amiodarone 2/2 baseline RBBB   - HTN, added home hydralazine 25mg TID. Possibly add home losartan if needed.     Respiratory: 02 Sat = 97% on RA.  - Respiratory insufficiency post operatively, improved now weaned off O2. Continue nebulizers PRN. Mucomyst added for secretion mobilization.   - Encourage C+DB and Use of IS 10x / hr while awake.  - CXR stable bilateral congestion, continue diuresis with lasix 40mg daily. Additional 20mg IV lasix given this morning.     GI: Stable.  - Continue PO diet   - Continue protonix 40mg for GI ppx   - Continue bowel regimen     Renal / : BUN/Cr 16/1.10  - Continue diuresis and K supplementation   - Monitor renal function.  - Monitor I/O's.    Endocrine:  hgba1c 6.0, TSH wnl   - no active issues     Prophylaxis:  - DVT prophylaxis with 5000 SubQ Heparin q8h.  - SCD's    Disposition: REUBEN when medically ready    
Milton Yan (MD)  Surgery; Thoracic and Cardiac Surgery  [address] [place] [address]  Phone: [phone]  Fax: [phone]  Scheduled Appointment: 06/30/2021 12:30 PM    Gerry Rushing)  Cardiology; Internal Medicine  [address] [place] [address]  Phone: [phone]  Fax: [phone]  Scheduled Appointment: 06/28/2021 10:15 AM    Jayant Chandra E  CARDIAC ELECTROPHYSIOLOGY  [address] [address]  Phone: [phone]  Fax: [phone]  Scheduled Appointment: 07/01/2021 10:30 AM  
A/P: 61 year old male, former smoker, former alcohol and drug use (cocaine and marijuana) with PMHx of HTN, HLD, CKD stage III, TIA (2007), CAD s/p NSTEMI 1/2019, recent hospitalization for PNA and heart failure exacerbation Patient found to have severe AI with dilated aorta and was evaluated by Dr. Yan as an outpatient for his known valvular disease. He presented to [facility] on 6/7/2021 for planned procedure. Patient underwent aortic root/ascending/hemiarch replacement with PFO closure on 6/7/2021. Procedure was uncomplicated, he was transferred to CT ICU post operatively in stable condition. Immediately post op patient have increased drainage from chest tubes requiring 2 FFP, 1 Plt, 1000 FEIBA and returned to the OR POD#0 for bleeding. He arrived back to the CT ICU on epi/levo. He was extubated POD#1. POD#3 patient had post operative afib, started on amio/BB. He continued to require HFNC and Bipap alternating for hypoxia and required multiple bronchoscopies for pulmonary toliet and was started on empiric abx. On POD#6 patient ntoed to have widened QRS and EP was consulted, he was cleared to continue amio/BB for baseline RBBB. He continued to remain stable and transferred to  POD#8. No acute events overnight.     Neurovascular: Stable. Pain well controlled with current regimen.  - Continue tylenol and oxycodone   - Continue zoloft/testosterone patch for deconditioning, will discontinue on discharge.     Cardiovascular: Hemodynamically stable. HR controlled.  - Severe AI and aortic aneurysm s/p Root/ascending/hemiarch. Continue asa 81mg, atorvastatin 40mg qhs and coreg 25mg BID   - Post operative Afib, continue amiodarone 200mg daily. EP following, cleared for amiodarone 2/2 baseline RBBB     Respiratory: 02 Sat = 98% on RA.  - Respiratory insufficiency post operatively, improved now weaned off O2. Continue nebulizers PRN   - Encourage C+DB and Use of IS 10x / hr while awake.  - CXR stable bilateral congestion, continue diuresis with lasix 40mg daily     GI: Stable.  - Continue PO diet   - Continue protonix 40mg for GI ppx   - Continue bowel regimen     Renal / : BUN/Cr 22/1.15.  - Continue diuresis and K supplementation   - Monitor renal function.  - Monitor I/O's.    Endocrine:  hgba1c 6.0, TSH wnl   - no active issues     Prophylaxis:  - DVT prophylaxis with 5000 SubQ Heparin q8h.  - SCD's    Disposition: [facility] when medically ready

## 2021-06-18 NOTE — DISCHARGE NOTE PROVIDER - CARE PROVIDERS DIRECT ADDRESSES
[email or web address]

## 2021-06-18 NOTE — DISCHARGE NOTE PROVIDER - NSDCFUSCHEDAPPT_GEN_ALL_CORE_FT
LISSA CAI ; 06/30/2021 ; [facility] CT Surg [address]   LISSA CAI ; 07/01/2021 ; [facility] Cardio Vasc [address]

## 2021-06-18 NOTE — PROGRESS NOTE ADULT - REASON FOR ADMISSION
aortic regurgitation, mitral regurgitation, thoracic aortic aneurysm, and coronary artery disease

## 2021-06-18 NOTE — DISCHARGE NOTE NURSING/CASE MANAGEMENT/SOCIAL WORK - PATIENT PORTAL LINK FT
You can access the FollowMyHealth Patient Portal offered by [facility] by registering at the following website: [email or web address]. By joining [employer]’s FollowMyHealth portal, you will also be able to view your health information using other applications (apps) compatible with our system.

## 2021-06-18 NOTE — DISCHARGE NOTE PROVIDER - CARE PROVIDER_API CALL
Milton Yan (MD)  Surgery; Thoracic and Cardiac Surgery  [address] [place] [address]  Phone: [phone]  Fax: [phone]  Scheduled Appointment: 06/30/2021 12:30 PM    Gerry Rushing)  Cardiology; Internal Medicine  [address] [place] [address]  Phone: [phone]  Fax: [phone]  Scheduled Appointment: 06/28/2021 10:15 AM    Jayant Chandra E  CARDIAC ELECTROPHYSIOLOGY  [address] [address]  Phone: [phone]  Fax: [phone]  Scheduled Appointment: 07/01/2021 10:30 AM

## 2021-06-18 NOTE — DISCHARGE NOTE PROVIDER - NSDCMRMEDTOKEN_GEN_ALL_CORE_FT
acetaminophen 325 mg oral tablet: 2 tab(s) orally every 6 hours, As needed, Mild Pain (1 - 3)  amiodarone 200 mg oral tablet: 1 tab(s) orally once a day  atorvastatin 40 mg oral tablet: 1 tab(s) orally once a day (at bedtime)  Coreg 25 mg oral tablet: 1 tab(s) orally every 12 hours  Ecotrin Adult Low Strength 81 mg oral delayed release tablet: 1 tab(s) orally once a day  furosemide 40 mg oral tablet: 1 tab(s) orally once a day  hydrALAZINE 25 mg oral tablet: 1 tab(s) orally every 8 hours  oxyCODONE 5 mg oral tablet: 1 tab(s) orally every 4 hours, As needed, Moderate Pain (4 - 6)  pantoprazole 40 mg oral delayed release tablet: 1 tab(s) orally once a day (before a meal)  polyethylene glycol 3350 oral powder for reconstitution: 17 gram(s) orally once a day  potassium chloride 20 mEq oral tablet, extended release: 1 tab(s) orally once a day  sertraline 25 mg oral tablet: 1 tab(s) orally once a day

## 2021-06-18 NOTE — DISCHARGE NOTE PROVIDER - NSDCCPTREATMENT_GEN_ALL_CORE_FT
PRINCIPAL PROCEDURE  Procedure: Replacement of aortic root and ascending aorta  Findings and Treatment: Aortic root, ascending, hemiarch replacement. PFO closure EF 40%

## 2021-06-18 NOTE — DISCHARGE NOTE PROVIDER - PROVIDER TOKENS
PROVIDER:[TOKEN:[[id number]],SCHEDULEDAPPT:[06/30/2021],SCHEDULEDAPPTTIME:[12:30 PM]],PROVIDER:[TOKEN:[[id number]],SCHEDULEDAPPT:[06/28/2021],SCHEDULEDAPPTTIME:[10:15 AM]],PROVIDER:[TOKEN:[[id number]],SCHEDULEDAPPT:[07/01/2021],SCHEDULEDAPPTTIME:[10:30 AM]]

## 2021-06-18 NOTE — DISCHARGE NOTE PROVIDER - HOSPITAL COURSE
61 year old male, former smoker, former alcohol and drug use (cocaine and marijuana) with PMHx of HTN, HLD, CKD stage III, TIA (2007), CAD s/p NSTEMI 1/2019, recent hospitalization for PNA and heart failure exacerbation Patient found to have severe AI with dilated aorta and was evaluated by Dr. Yan as an outpatient for his known valvular disease. He presented to [facility] on 6/7/2021 for planned procedure. Patient underwent aortic root/ascending/hemiarch replacement with PFO closure on 6/7/2021. Procedure was uncomplicated, he was transferred to CT ICU post operatively in stable condition. Immediately post op patient have increased drainage from chest tubes requiring 2 FFP, 1 Plt, 1000 FEIBA and returned to the OR POD#0 for bleeding. He arrived back to the CT ICU on epi/levo. He was extubated POD#1. POD#3 patient had post operative afib, started on amio/BB. He continued to require HFNC and Bipap alternating for hypoxia and required multiple bronchoscopies for pulmonary toilet and was started on empiric abx. On POD#6 patient ntoed to have widened QRS and EP was consulted, he was cleared to continue amio/BB for baseline RBBB. He continued to remain stable and transferred to  POD#8. While on the floor he worked with PT increasing his strength and mobility and by POD 11 he is clinically stable to be discharged to [facility].

## 2021-06-18 NOTE — PROGRESS NOTE ADULT - NSICDXPILOT_GEN_ALL_CORE
[place]

## 2021-06-21 ENCOUNTER — TRANSCRIPTION ENCOUNTER (OUTPATIENT)
Age: 61
End: 2021-06-21

## 2021-06-21 ENCOUNTER — NON-APPOINTMENT (OUTPATIENT)
Age: 61
End: 2021-06-21

## 2021-06-23 RX ORDER — CARVEDILOL 25 MG/1
25 TABLET, FILM COATED ORAL TWICE DAILY
Qty: 60 | Refills: 3 | Status: ACTIVE | COMMUNITY
Start: 1900-01-01 | End: 1900-01-01

## 2021-06-29 ENCOUNTER — TRANSCRIPTION ENCOUNTER (OUTPATIENT)
Age: 61
End: 2021-06-29

## 2021-06-29 RX ORDER — AMLODIPINE BESYLATE 10 MG/1
10 TABLET ORAL
Qty: 30 | Refills: 2 | Status: DISCONTINUED | COMMUNITY
End: 2021-06-29

## 2021-06-29 RX ORDER — LOSARTAN POTASSIUM 100 MG/1
100 TABLET, FILM COATED ORAL DAILY
Refills: 0 | Status: DISCONTINUED | COMMUNITY
End: 2021-06-29

## 2021-06-29 RX ORDER — SERTRALINE 25 MG/1
25 TABLET, FILM COATED ORAL DAILY
Qty: 30 | Refills: 0 | Status: ACTIVE | COMMUNITY
Start: 2021-06-29

## 2021-06-30 ENCOUNTER — RESULT REVIEW (OUTPATIENT)
Age: 61
End: 2021-06-30

## 2021-06-30 ENCOUNTER — TRANSCRIPTION ENCOUNTER (OUTPATIENT)
Age: 61
End: 2021-06-30

## 2021-06-30 ENCOUNTER — OUTPATIENT (OUTPATIENT)
Dept: OUTPATIENT SERVICES | Facility: HOSPITAL | Age: 61
LOS: 1 days | End: 2021-06-30
Payer: COMMERCIAL

## 2021-06-30 ENCOUNTER — APPOINTMENT (OUTPATIENT)
Dept: HEART AND VASCULAR | Facility: CLINIC | Age: 61
End: 2021-06-30
Payer: COMMERCIAL

## 2021-06-30 ENCOUNTER — APPOINTMENT (OUTPATIENT)
Dept: CARDIOTHORACIC SURGERY | Facility: CLINIC | Age: 61
End: 2021-06-30
Payer: COMMERCIAL

## 2021-06-30 ENCOUNTER — NON-APPOINTMENT (OUTPATIENT)
Age: 61
End: 2021-06-30

## 2021-06-30 VITALS
WEIGHT: 224 LBS | HEIGHT: 72 IN | SYSTOLIC BLOOD PRESSURE: 115 MMHG | HEART RATE: 73 BPM | OXYGEN SATURATION: 96 % | BODY MASS INDEX: 30.34 KG/M2 | DIASTOLIC BLOOD PRESSURE: 67 MMHG

## 2021-06-30 VITALS
WEIGHT: 224 LBS | HEART RATE: 72 BPM | TEMPERATURE: 96.9 F | DIASTOLIC BLOOD PRESSURE: 69 MMHG | OXYGEN SATURATION: 99 % | SYSTOLIC BLOOD PRESSURE: 110 MMHG | BODY MASS INDEX: 30.34 KG/M2 | RESPIRATION RATE: 17 BRPM | HEIGHT: 72 IN

## 2021-06-30 DIAGNOSIS — E78.5 HYPERLIPIDEMIA, UNSPECIFIED: ICD-10-CM

## 2021-06-30 DIAGNOSIS — N18.30 CHRONIC KIDNEY DISEASE, STAGE 3 UNSPECIFIED: ICD-10-CM

## 2021-06-30 DIAGNOSIS — J95.861 POSTPROCEDURAL HEMATOMA OF A RESPIRATORY SYSTEM ORGAN OR STRUCTURE FOLLOWING OTHER PROCEDURE: ICD-10-CM

## 2021-06-30 DIAGNOSIS — J96.01 ACUTE RESPIRATORY FAILURE WITH HYPOXIA: ICD-10-CM

## 2021-06-30 DIAGNOSIS — J44.9 CHRONIC OBSTRUCTIVE PULMONARY DISEASE, UNSPECIFIED: ICD-10-CM

## 2021-06-30 DIAGNOSIS — J95.831 POSTPROCEDURAL HEMORRHAGE OF A RESPIRATORY SYSTEM ORGAN OR STRUCTURE FOLLOWING OTHER PROCEDURE: ICD-10-CM

## 2021-06-30 DIAGNOSIS — I25.10 ATHEROSCLEROTIC HEART DISEASE OF NATIVE CORONARY ARTERY WITHOUT ANGINA PECTORIS: ICD-10-CM

## 2021-06-30 DIAGNOSIS — I25.2 OLD MYOCARDIAL INFARCTION: ICD-10-CM

## 2021-06-30 DIAGNOSIS — I35.1 NONRHEUMATIC AORTIC (VALVE) INSUFFICIENCY: ICD-10-CM

## 2021-06-30 DIAGNOSIS — Z87.891 PERSONAL HISTORY OF NICOTINE DEPENDENCE: ICD-10-CM

## 2021-06-30 DIAGNOSIS — I50.30 UNSPECIFIED DIASTOLIC (CONGESTIVE) HEART FAILURE: ICD-10-CM

## 2021-06-30 DIAGNOSIS — I34.0 NONRHEUMATIC MITRAL (VALVE) INSUFFICIENCY: ICD-10-CM

## 2021-06-30 DIAGNOSIS — I45.10 UNSPECIFIED RIGHT BUNDLE-BRANCH BLOCK: ICD-10-CM

## 2021-06-30 DIAGNOSIS — I71.2 THORACIC AORTIC ANEURYSM, WITHOUT RUPTURE: ICD-10-CM

## 2021-06-30 DIAGNOSIS — Z87.898 PERSONAL HISTORY OF OTHER SPECIFIED CONDITIONS: ICD-10-CM

## 2021-06-30 DIAGNOSIS — D62 ACUTE POSTHEMORRHAGIC ANEMIA: ICD-10-CM

## 2021-06-30 DIAGNOSIS — Z86.73 PERSONAL HISTORY OF TRANSIENT ISCHEMIC ATTACK (TIA), AND CEREBRAL INFARCTION WITHOUT RESIDUAL DEFICITS: ICD-10-CM

## 2021-06-30 DIAGNOSIS — I48.0 PAROXYSMAL ATRIAL FIBRILLATION: ICD-10-CM

## 2021-06-30 DIAGNOSIS — Q21.1 ATRIAL SEPTAL DEFECT: ICD-10-CM

## 2021-06-30 DIAGNOSIS — J98.11 ATELECTASIS: ICD-10-CM

## 2021-06-30 DIAGNOSIS — I13.0 HYPERTENSIVE HEART AND CHRONIC KIDNEY DISEASE WITH HEART FAILURE AND STAGE 1 THROUGH STAGE 4 CHRONIC KIDNEY DISEASE, OR UNSPECIFIED CHRONIC KIDNEY DISEASE: ICD-10-CM

## 2021-06-30 PROCEDURE — 99072 ADDL SUPL MATRL&STAF TM PHE: CPT

## 2021-06-30 PROCEDURE — 71045 X-RAY EXAM CHEST 1 VIEW: CPT | Mod: 26

## 2021-06-30 PROCEDURE — 99213 OFFICE O/P EST LOW 20 MIN: CPT

## 2021-06-30 PROCEDURE — 93000 ELECTROCARDIOGRAM COMPLETE: CPT

## 2021-06-30 PROCEDURE — 71045 X-RAY EXAM CHEST 1 VIEW: CPT

## 2021-06-30 PROCEDURE — 99024 POSTOP FOLLOW-UP VISIT: CPT

## 2021-07-02 ENCOUNTER — TRANSCRIPTION ENCOUNTER (OUTPATIENT)
Age: 61
End: 2021-07-02

## 2021-07-07 ENCOUNTER — TRANSCRIPTION ENCOUNTER (OUTPATIENT)
Age: 61
End: 2021-07-07

## 2021-07-09 ENCOUNTER — TRANSCRIPTION ENCOUNTER (OUTPATIENT)
Age: 61
End: 2021-07-09

## 2021-07-12 NOTE — PHYSICAL EXAM
[Well Developed] : well developed [Well Nourished] : well nourished [No Acute Distress] : no acute distress [Normal S1, S2] : normal S1, S2 [Clear Lung Fields] : clear lung fields [Good Air Entry] : good air entry [No Respiratory Distress] : no respiratory distress  [Normal Gait] : normal gait [No Edema] : no edema [No Rash] : no rash [Moves all extremities] : moves all extremities [Alert and Oriented] : alert and oriented

## 2021-07-12 NOTE — CARDIOLOGY SUMMARY
[de-identified] : 6/30/21 sinus at 74bpm with RBBB/LAFB [de-identified] : MICAELA w/Doppler (05.03.21 @ 14:34)  1. Mildly reduced left ventricular systolic function. LVEF 45%.  2. Normal right ventricular size.  3. Ixatoi-kh-oyyqbibaqp reduced rightventricular systolic function.  4. No LA/RA/MARIS/RAA thrombus seen.  5. Color flow Doppler reveals evidence of a patent foramen ovale with primarily left to right shunting by color doppler.  6. Mild-to-moderate mitral regurgitation.  7. Fibrocalcific tricuspid aortic valve without significant stenosis. The aortic valve leaflets do not coapt. There is severe aortic regurgitation. The vena contracta width is 0.60 cm (severe >0.6 cm). There is diastolic flow reversal in the descending aorta supporting severe aortic regurgitation diagnosis. [de-identified] : \par Cardiac Cath 4/15/21:\par Left main: angiographically normal\par LAD: significant ectasia noted\par Distal LAD: moderate diffuse disease\par Circumflex: Significant ectasia noted\par Distal Circ: severe diffuse disease\par RCA: Significant ectasia noted.\par

## 2021-07-12 NOTE — HISTORY OF PRESENT ILLNESS
[FreeTextEntry1] :  \par \par 60 y/o male with HTN, CKD, TIA 2007, NSTEMI (1/2019), h/o drug and alcohol misuse (cocaine/marijuana 30 yr hx), and aortic insufficiency s/p elective replacement of aortic root and ascending/hemiarch replacement and PFO closure on 6/7, with post-op pAFib/RVR on 6/10/21, who presents for follow up.\par \par He was seen in the hospital with paroxysmal atrial fibrillation.  He was started on amiodarone- not on oral anticoagulation.  Has a baseline RBBB and LAFB.  He is in rehab and notes fatigue.   No palpitations, chest pain, SOB, syncope or near syncope. \par \par

## 2021-07-12 NOTE — DISCUSSION/SUMMARY
[FreeTextEntry1] : \par 60 y/o male with HTN, CKD, TIA 2007, NSTEMI (1/2019), h/o drug and alcohol misuse (cocaine/marijuana 30 yr hx), and aortic insufficiency s/p elective replacement of aortic root and ascending/hemiarch replacement and PFO closure on 6/7, with post-op pAFib/RVR on 6/10/21, who presents for follow up.  We discussed what the normal conduction system of the heart is and what atrial fibrillation is.  It is unclear if this was post operative or something that he will continue to have.  He is currently not on oral AC (unclear why not discharged on this) - however we will wait to see if he has recurrent afib before starting him on this as he would need CT surgery clearance.  I have asked him to stop amiodarone in 1 month.  He will follow up 6 weeks after that at which time we will send him an event monitor to assess if he has recurrent afib.  He is amenable to this plan and knows to call with any questions or concerns.   \par

## 2021-07-14 ENCOUNTER — TRANSCRIPTION ENCOUNTER (OUTPATIENT)
Age: 61
End: 2021-07-14

## 2021-07-15 ENCOUNTER — TRANSCRIPTION ENCOUNTER (OUTPATIENT)
Age: 61
End: 2021-07-15

## 2021-07-16 ENCOUNTER — TRANSCRIPTION ENCOUNTER (OUTPATIENT)
Age: 61
End: 2021-07-16

## 2021-07-19 ENCOUNTER — TRANSCRIPTION ENCOUNTER (OUTPATIENT)
Age: 61
End: 2021-07-19

## 2021-07-28 ENCOUNTER — APPOINTMENT (OUTPATIENT)
Dept: CARDIOTHORACIC SURGERY | Facility: CLINIC | Age: 61
End: 2021-07-28
Payer: COMMERCIAL

## 2021-07-28 VITALS
HEIGHT: 72 IN | OXYGEN SATURATION: 95 % | WEIGHT: 198 LBS | SYSTOLIC BLOOD PRESSURE: 149 MMHG | TEMPERATURE: 98.4 F | BODY MASS INDEX: 26.82 KG/M2 | HEART RATE: 74 BPM | DIASTOLIC BLOOD PRESSURE: 80 MMHG | RESPIRATION RATE: 17 BRPM

## 2021-07-28 DIAGNOSIS — Z09 ENCOUNTER FOR FOLLOW-UP EXAMINATION AFTER COMPLETED TREATMENT FOR CONDITIONS OTHER THAN MALIGNANT NEOPLASM: ICD-10-CM

## 2021-07-28 PROCEDURE — 99024 POSTOP FOLLOW-UP VISIT: CPT

## 2021-07-29 PROBLEM — Z09 POSTOP CHECK: Status: ACTIVE | Noted: 2021-07-01

## 2021-07-29 RX ORDER — FUROSEMIDE 40 MG/1
40 TABLET ORAL DAILY
Qty: 30 | Refills: 0 | Status: COMPLETED | COMMUNITY
Start: 2021-06-29 | End: 2021-07-29

## 2021-07-29 RX ORDER — PANTOPRAZOLE SODIUM 40 MG/1
40 TABLET, DELAYED RELEASE ORAL
Qty: 30 | Refills: 2 | Status: COMPLETED | COMMUNITY
Start: 2021-06-29 | End: 2021-07-29

## 2021-07-29 RX ORDER — POTASSIUM CHLORIDE 1500 MG/1
20 TABLET, EXTENDED RELEASE ORAL DAILY
Refills: 0 | Status: COMPLETED | COMMUNITY
Start: 2021-06-29 | End: 2021-07-29

## 2021-10-20 ENCOUNTER — NON-APPOINTMENT (OUTPATIENT)
Age: 61
End: 2021-10-20

## 2021-10-20 ENCOUNTER — APPOINTMENT (OUTPATIENT)
Dept: HEART AND VASCULAR | Facility: CLINIC | Age: 61
End: 2021-10-20
Payer: COMMERCIAL

## 2021-10-20 VITALS — HEART RATE: 89 BPM | WEIGHT: 210 LBS | HEIGHT: 72 IN | BODY MASS INDEX: 28.44 KG/M2 | TEMPERATURE: 98 F

## 2021-10-20 VITALS — DIASTOLIC BLOOD PRESSURE: 103 MMHG | SYSTOLIC BLOOD PRESSURE: 182 MMHG

## 2021-10-20 PROCEDURE — 99213 OFFICE O/P EST LOW 20 MIN: CPT

## 2021-10-20 PROCEDURE — 93000 ELECTROCARDIOGRAM COMPLETE: CPT

## 2021-10-20 RX ORDER — AMIODARONE HYDROCHLORIDE 200 MG/1
200 TABLET ORAL DAILY
Qty: 30 | Refills: 2 | Status: DISCONTINUED | COMMUNITY
Start: 2021-06-29 | End: 2021-10-20

## 2021-10-26 NOTE — PHYSICAL EXAM
[Well Developed] : well developed [Well Nourished] : well nourished [No Acute Distress] : no acute distress [Normal S1, S2] : normal S1, S2 [Clear Lung Fields] : clear lung fields [Good Air Entry] : good air entry [No Respiratory Distress] : no respiratory distress  [Normal Gait] : normal gait [No Edema] : no edema [No Rash] : no rash [Moves all extremities] : moves all extremities [Alert and Oriented] : alert and oriented [No Focal Deficits] : no focal deficits

## 2021-10-26 NOTE — DISCUSSION/SUMMARY
[FreeTextEntry1] : \par 60 y/o male with HTN, CKD, TIA 2007, NSTEMI (1/2019), h/o drug and alcohol misuse (cocaine/marijuana 30 yr hx), and aortic insufficiency s/p elective replacement of aortic root and ascending/hemiarch replacement and PFO closure on 6/7, with post-op pAFib/RVR on 6/10/21, who presents for follow up.  We discussed what the normal conduction system of the heart is and what atrial fibrillation is.  It is unclear if this was post operative or something that he will continue to have.  He is currently not on oral AC (unclear why not discharged on this) - however we will wait to see if he has recurrent afib before starting him on this as he would need CT surgery clearance.  HE has been off amiodarone for the past 2+months.  We will send him an event monitor to assess if he is having any silent atrial fibrillation.  No symptoms concerning for recurrent atrial fibrillation but we discussed that he can have asymptomatic atrial fibrillation and he would still need to be on oral anticoagulation for this.    He will follow up 6-8 weeks or sooner if needed and knows to call with any questions or concerns.   \par

## 2021-10-26 NOTE — REVIEW OF SYSTEMS
[Fever] : no fever [Chills] : no chills [SOB] : no shortness of breath [Chest Discomfort] : no chest discomfort [Palpitations] : no palpitations [Syncope] : no syncope [Cough] : no cough [Rash] : no rash [Dizziness] : no dizziness [Negative] : Gastrointestinal

## 2021-10-26 NOTE — HISTORY OF PRESENT ILLNESS
[FreeTextEntry1] :  \par \par 62 y/o male with HTN, CKD, TIA 2007, NSTEMI (1/2019), h/o drug and alcohol misuse (cocaine/marijuana 30 yr hx), and aortic insufficiency s/p elective replacement of aortic root and ascending/hemiarch replacement and PFO closure on 6/7, with post-op pAFib/RVR on 6/10/21, who presents for follow up.\par \par He was seen in the hospital with paroxysmal atrial fibrillation.  He was started on amiodarone- not on oral anticoagulation.  Has a baseline RBBB and LAFB.  Since his last visit he has stopped amiodarone.   No palpitations, chest pain, SOB, syncope or near syncope. \par \par

## 2021-10-26 NOTE — CARDIOLOGY SUMMARY
[de-identified] : 6/30/21 sinus at 74bpm with RBBB/LAFB\par 10/20/21 sinus at 86 bpm with RBBB/LAFB [de-identified] : MICAELA w/Doppler (05.03.21 @ 14:34)  1. Mildly reduced left ventricular systolic function. LVEF 45%.  2. Normal right ventricular size.  3. Hqmhav-jv-qbrmhbxirf reduced rightventricular systolic function.  4. No LA/RA/MARIS/RAA thrombus seen.  5. Color flow Doppler reveals evidence of a patent foramen ovale with primarily left to right shunting by color doppler.  6. Mild-to-moderate mitral regurgitation.  7. Fibrocalcific tricuspid aortic valve without significant stenosis. The aortic valve leaflets do not coapt. There is severe aortic regurgitation. The vena contracta width is 0.60 cm (severe >0.6 cm). There is diastolic flow reversal in the descending aorta supporting severe aortic regurgitation diagnosis. [de-identified] : \par Cardiac Cath 4/15/21:\par Left main: angiographically normal\par LAD: significant ectasia noted\par Distal LAD: moderate diffuse disease\par Circumflex: Significant ectasia noted\par Distal Circ: severe diffuse disease\par RCA: Significant ectasia noted.\par

## 2021-11-15 ENCOUNTER — APPOINTMENT (OUTPATIENT)
Dept: HEART AND VASCULAR | Facility: CLINIC | Age: 61
End: 2021-11-15
Payer: COMMERCIAL

## 2021-11-15 PROCEDURE — 93228 REMOTE 30 DAY ECG REV/REPORT: CPT

## 2022-07-26 ENCOUNTER — FORM ENCOUNTER (OUTPATIENT)
Age: 62
End: 2022-07-26

## 2022-07-27 ENCOUNTER — OUTPATIENT (OUTPATIENT)
Dept: OUTPATIENT SERVICES | Facility: HOSPITAL | Age: 62
LOS: 1 days | End: 2022-07-27
Payer: COMMERCIAL

## 2022-07-27 ENCOUNTER — APPOINTMENT (OUTPATIENT)
Dept: CARDIOTHORACIC SURGERY | Facility: CLINIC | Age: 62
End: 2022-07-27

## 2022-07-27 VITALS
OXYGEN SATURATION: 99 % | SYSTOLIC BLOOD PRESSURE: 152 MMHG | WEIGHT: 211 LBS | HEART RATE: 64 BPM | TEMPERATURE: 97.1 F | DIASTOLIC BLOOD PRESSURE: 93 MMHG | BODY MASS INDEX: 28.58 KG/M2 | HEIGHT: 72 IN | RESPIRATION RATE: 18 BRPM

## 2022-07-27 DIAGNOSIS — Z09 ENCOUNTER FOR FOLLOW-UP EXAMINATION AFTER COMPLETED TREATMENT FOR CONDITIONS OTHER THAN MALIGNANT NEOPLASM: ICD-10-CM

## 2022-07-27 DIAGNOSIS — Z95.3 PRESENCE OF XENOGENIC HEART VALVE: ICD-10-CM

## 2022-07-27 DIAGNOSIS — Z98.890 OTHER SPECIFIED POSTPROCEDURAL STATES: ICD-10-CM

## 2022-07-27 DIAGNOSIS — Z87.74 PERSONAL HISTORY OF (CORRECTED) CONGENITAL MALFORMATIONS OF HEART AND CIRCULATORY SYSTEM: ICD-10-CM

## 2022-07-27 DIAGNOSIS — Z86.79 OTHER SPECIFIED POSTPROCEDURAL STATES: ICD-10-CM

## 2022-07-27 PROCEDURE — 93306 TTE W/DOPPLER COMPLETE: CPT

## 2022-07-27 PROCEDURE — 93306 TTE W/DOPPLER COMPLETE: CPT | Mod: 26

## 2022-07-27 PROCEDURE — 99213 OFFICE O/P EST LOW 20 MIN: CPT

## 2022-07-28 PROBLEM — Z09 SURGICAL FOLLOWUP: Status: ACTIVE | Noted: 2022-07-28

## 2022-07-28 PROBLEM — Z95.3 S/P AORTIC VALVE REPLACEMENT WITH TISSUE: Status: ACTIVE | Noted: 2021-06-29

## 2022-07-28 PROBLEM — Z98.890 S/P ASCENDING AORTIC ANEURYSM REPAIR: Status: ACTIVE | Noted: 2021-07-01

## 2022-07-28 PROBLEM — Z87.74 S/P PATENT FORAMEN OVALE CLOSURE: Status: ACTIVE | Noted: 2021-07-01

## 2022-07-31 NOTE — PHYSICAL EXAM
[Sclera] : the sclera and conjunctiva were normal [Neck Appearance] : the appearance of the neck was normal [Respiration, Rhythm And Depth] : normal respiratory rhythm and effort [Exaggerated Use Of Accessory Muscles For Inspiration] : no accessory muscle use [Heart Rate And Rhythm] : heart rate was normal and rhythm regular [Heart Sounds] : normal S1 and S2 [Examination Of The Chest] : the chest was normal in appearance [2+] : left 2+ [Bowel Sounds] : normal bowel sounds [Abdomen Soft] : soft [No CVA Tenderness] : no ~M costovertebral angle tenderness [Abnormal Walk] : normal gait [] : no rash [No Focal Deficits] : no focal deficits [Oriented To Time, Place, And Person] : oriented to person, place, and time [FreeTextEntry1] : deferred

## 2022-07-31 NOTE — PROCEDURE
[FreeTextEntry1] : \par Patient was advised to view the educational video prior to this visit regarding aortic pathology, risk factors, surgical procedures, and lifestyle modifications. Video can be retrieved at <[email or web address].\par

## 2022-07-31 NOTE — CONSULT LETTER
[Dear  ___] : Dear  [unfilled], [FreeTextEntry2] :  Gerry Rushing MD [FreeTextEntry1] : I had the pleasure of seeing your patient, LISSA CAI, in my office today. \par \par We take a multidisciplinary team approach to patient care and consider you, the referring physician, an extension of our team. We will maintain an open line of communication with you throughout your patient's treatment course.  \par \par As you recall, he is a 62 year old male status post  aortic root, ascending and hemiarch replacement and PFO closure on 6/7/21. The patient presents to the office today for a routine follow up visit with repeat diagnostic imaging. I have enclosed a copy for your records.\par \par The surgical repair is intact and stable. Therefore, I have recommended that the patient will follow up in the [facility] in one year with a CTA chest to monitor his surgical repair. My office will assist the patient with his upcoming appointment and I will update you on his  progress at that time.\par \par I have discussed with the patient that we will continue to monitor his aortic pathology closely at the [facility] the [facility], that encompasses the entire health care system and is one of the largest in the [place] at this point.\par \par I appreciate the opportunity to care for your patient at the [facility] based at [facility]. If there are any questions or concerns, please call me directly at [phone]. \par \par Please see my note below. \par \par \par Sincerely, \par \par \par \par \par \par \par Milton Yan M.D.\par Professor of Cardiovascular and Thoracic Surgery\par Minimally Invasive Valve Surgeon\par Director of Aortic Surgery, [facility]\par Cell: [phone]\par Email: [email or web address] \par \par [facility]:\par [address] [place] [address], [address] Floor, [place] [address]\par Office: [phone]\par Fax: [phone]\par \par [facility]:\par Department of Cardiovascular and Thoracic Surgery\par [address] [place] [address], [address]\par Office: [phone]\par Fax: [phone]\par \par Practice Manager: Ms. Radhika Vidal\par Email: [email or web address]\par Phone: [phone]

## 2022-07-31 NOTE — ASSESSMENT
[FreeTextEntry1] : 62 year old male, former smoker, former alcohol and drug use (cocaine and marijuana), with a PMHx of HTN, HLD, CKD stage III, TIA (2007), CAD s/p NSTEMI 1/2019, heart failure, severe AI, dilated aorta s/p aortic root, ascending and hemiarch replacement and PFO closure on 6/7/21. He presents for a 1 year follow up with repeat diagnostic imaging. \par \par I have reviewed the patient's medical records, diagnostic images during the time of this office consultation and have made the following recommendation. The surgical repair is intact and stable.\par \par Plan \par 1. Follow up in [facility] in one year with CTA chest. \par 2. Continue medication regimen.\par 3. Follow up with cardiologist and PCP.\par 4. Blood pressure management.\par

## 2022-07-31 NOTE — HISTORY OF PRESENT ILLNESS
[FreeTextEntry1] : 62 year old male, former smoker, former alcohol and drug use (cocaine and marijuana), with a PMHx of HTN, HLD, CKD stage III, TIA (2007), CAD s/p NSTEMI 1/2019, heart failure, severe AI, dilated aorta s/p aortic root, ascending and hemiarch replacement and PFO closure on 6/7/21. He presents for a 1 year follow up with repeat diagnostic imaging. \par \par CTA chest 7/20/22 + 7/26/22:\par 4.4cm dilated aortic root. LVH. no evidence of an anastomotic leak or other defect. \par \par ECHO today:\par  1. Technically difficult study.\par  2. Severe symmetric left ventricular hypertrophy.\par  3. Borderline normal left ventricular systolic function, LVEF 50%.\par  4. Basal inferior segment not well visualized, likely hypokinetic. The other walls exhibit normal wall motion.\par  5. Grade I left ventricular diastolic dysfunction.\par  6. Normal right ventricular size and systolic function.\par  7. Bioprosthetic valve is seen in the aortic position with apparent normal function, without evidence of prosthetic dysfunction.\par  8. No evidence of pulmonary hypertension, pulmonary artery systolic pressure is 19 mmHg.\par  9. No pericardial effusion.\par 10. Compared to the previous TTE performed on 6/18/2021, interval resolution of pericardial effusion.\par \par Patient is doing well and denies recent hospitalization, ER visits, or surgeries. He  denies fever, chills, fatigue, headache, blurred vision, dizziness, syncope, chest pain, palpitations, shortness of breath, orthopnea, paroxysmal nocturnal dyspnea, nausea, vomiting, abdominal pain, back pain, BRBPR or swelling to legs.\par

## 2023-02-03 ENCOUNTER — NON-APPOINTMENT (OUTPATIENT)
Age: 63
End: 2023-02-03

## 2023-06-05 NOTE — DISCHARGE NOTE NURSING/CASE MANAGEMENT/SOCIAL WORK - NSTRANSFERBELONGINGSDISPO_GEN_A_NUR
Brief Postoperative Note    Annie Michelle  YOB: 1948  [id number]    Pre-operative Diagnosis: ascites    Post-operative Diagnosis: Same    Procedure: para    Anesthesia: Local    Surgeons/Assistants: lucero    Estimated Blood Loss: 0    Complications: None    Specimens: Was Not Obtained    Findings: milky fluid    Electronically signed by Gene Bustamante MD on 6/5/2023 at 10:38 AM
given to security/with patient

## 2023-07-21 ENCOUNTER — NON-APPOINTMENT (OUTPATIENT)
Age: 63
End: 2023-07-21

## 2023-07-26 ENCOUNTER — APPOINTMENT (OUTPATIENT)
Dept: CARDIOTHORACIC SURGERY | Facility: CLINIC | Age: 63
End: 2023-07-26

## 2023-08-16 NOTE — HISTORY OF PRESENT ILLNESS
[FreeTextEntry1] : 63 year old male, former smoker, former alcohol and drug use (cocaine and marijuana), with a PMHx of HTN, HLD, CKD stage III, TIA (2007), CAD s/p NSTEMI 1/2019, heart failure, severe AI, dilated aorta s/p aortic root, ascending and hemiarch replacement and PFO closure on 6/7/21. He presents for a 1 year follow up with repeat diagnostic imaging.   CTA chest 8/9/23 interval decrased volume of low attenuation around the ascending aorta graft. no evidence for postsurgical complication. stable caliber thoracic aorta with 4.4cm dilated aortic root.

## 2023-08-16 NOTE — ASSESSMENT
[FreeTextEntry1] : 63 year old male, former smoker, former alcohol and drug use (cocaine and marijuana), with a PMHx of HTN, HLD, CKD stage III, TIA (2007), CAD s/p NSTEMI 1/2019, heart failure, severe AI, dilated aorta s/p aortic root, ascending and hemiarch replacement and PFO closure on 6/7/21. He presents for a 1 year follow up with repeat diagnostic imaging.   I have reviewed the patient's medical records, diagnostic images during the time of this office consultation and have made the following recommendation. Review of the imaging shows his  aortic pathology has remained stable and does not require surgical intervention.  Plan  - Follow up in [facility] in ___ with ____. - Continue medication regimen. - Follow up with cardiologist and PCP. - Blood pressure management. - Discussed signs and symptoms that warrant emergency medical attention.

## 2023-08-16 NOTE — DATA REVIEWED
[FreeTextEntry1] : CTA chest 7/20/22 + 7/26/22:\par 4.4cm dilated aortic root. LVH. no evidence of an anastomotic leak or other defect. \par \par ECHO 7/27/22:\par  1. Technically difficult study.\par  2. Severe symmetric left ventricular hypertrophy.\par  3. Borderline normal left ventricular systolic function, LVEF 50%.\par  4. Basal inferior segment not well visualized, likely hypokinetic. The other walls exhibit normal wall motion.\par  5. Grade I left ventricular diastolic dysfunction.\par  6. Normal right ventricular size and systolic function.\par  7. Bioprosthetic valve is seen in the aortic position with apparent normal function, without evidence of prosthetic dysfunction.\par  8. No evidence of pulmonary hypertension, pulmonary artery systolic pressure is 19 mmHg.\par  9. No pericardial effusion.\par 10. Compared to the previous TTE performed on 6/18/2021, interval resolution of pericardial effusion.\par

## 2023-08-23 ENCOUNTER — APPOINTMENT (OUTPATIENT)
Dept: CARDIOTHORACIC SURGERY | Facility: CLINIC | Age: 63
End: 2023-08-23

## 2023-08-23 VITALS
TEMPERATURE: 97.4 F | WEIGHT: 211 LBS | SYSTOLIC BLOOD PRESSURE: 155 MMHG | HEIGHT: 72 IN | RESPIRATION RATE: 18 BRPM | DIASTOLIC BLOOD PRESSURE: 87 MMHG | OXYGEN SATURATION: 95 % | BODY MASS INDEX: 28.58 KG/M2 | HEART RATE: 74 BPM

## 2023-09-25 ENCOUNTER — NON-APPOINTMENT (OUTPATIENT)
Age: 63
End: 2023-09-25

## 2024-04-23 NOTE — PRE-OP CHECKLIST - TEMPERATURE IN CELSIUS (DEGREES C)
SUBJECTIVE:    Patient ID: Sloane Dimas is a 58 y.o. female.    Chief Complaint: HTN Check Up, mammogram order, requests referral to podiatry and retina specialist (-possible ingrown toe nail R foot), discuss weight loss plan, and requests referral to ortho dr schwab    With hypertension and prediabetes is in for regular visit.  Doing well on current blood pressure medicines.  Last labs demonstrated borderline vitamin-D level and she has been taking supplements with benefit.  Headaches have resolved with the control of her blood pressure.  Prediabetes has been found and she has been working on her diet and activity.  She has lost 8 lb since her last visit 4 months ago.      She has chronic arthritis in her knees and needs referral to orthopedic. eye exam is due with her retinal specialist.  Has seen Dr. Pulliam for some GI issues but does still need to schedule her colonoscopy.  Has a family history of colon cancer. Would like to see her podiatrist again has some concerns of possible ingrown toenails    Mammogram is also due            Office Visit on 12/12/2023   Component Date Value Ref Range Status    Vitamin D, 25-OH, Total 12/12/2023 30  30 - 100 ng/mL Final    TSH w/reflex to FT4 12/12/2023 1.44  0.40 - 4.50 mIU/L Final    Creatinine, Urine 12/12/2023 240  20 - 275 mg/dL Final    Microalb, Ur 12/12/2023 1.0  See Note: mg/dL Final    Microalb/Creat Ratio 12/12/2023 4  <30 mcg/mg creat Final    Glucose 12/12/2023 92  65 - 99 mg/dL Final    BUN 12/12/2023 8  7 - 25 mg/dL Final    Creatinine 12/12/2023 0.97  0.50 - 1.03 mg/dL Final    eGFR 12/12/2023 68  > OR = 60 mL/min/1.73m2 Final    BUN/Creatinine Ratio 12/12/2023 SEE NOTE:  6 - 22 (calc) Final    Sodium 12/12/2023 140  135 - 146 mmol/L Final    Potassium 12/12/2023 4.0  3.5 - 5.3 mmol/L Final    Chloride 12/12/2023 103  98 - 110 mmol/L Final    CO2 12/12/2023 27  20 - 32 mmol/L Final    Calcium 12/12/2023 9.1  8.6 - 10.4 mg/dL Final    Total  Protein 2023 7.2  6.1 - 8.1 g/dL Final    Albumin 2023 4.0  3.6 - 5.1 g/dL Final    Globulin, Total 2023 3.2  1.9 - 3.7 g/dL (calc) Final    Albumin/Globulin Ratio 2023 1.3  1.0 - 2.5 (calc) Final    Total Bilirubin 2023 0.3  0.2 - 1.2 mg/dL Final    Alkaline Phosphatase 2023 97  37 - 153 U/L Final    AST 2023 15  10 - 35 U/L Final    ALT 2023 13  6 - 29 U/L Final    WBC 2023 7.5  3.8 - 10.8 Thousand/uL Final    RBC 2023 4.49  3.80 - 5.10 Million/uL Final    Hemoglobin 2023 12.8  11.7 - 15.5 g/dL Final    Hematocrit 2023 39.2  35.0 - 45.0 % Final    MCV 2023 87.3  80.0 - 100.0 fL Final    MCH 2023 28.5  27.0 - 33.0 pg Final    MCHC 2023 32.7  32.0 - 36.0 g/dL Final    RDW 2023 13.1  11.0 - 15.0 % Final    Platelets 2023 353  140 - 400 Thousand/uL Final    MPV 2023 10.1  7.5 - 12.5 fL Final    Neutrophils, Abs 2023 3,653  1,500 - 7,800 cells/uL Final    Lymph # 2023 3,413  850 - 3,900 cells/uL Final    Mono # 2023 338  200 - 950 cells/uL Final    Eos # 2023 60  15 - 500 cells/uL Final    Baso # 2023 38  0 - 200 cells/uL Final    Neutrophils Relative 2023 48.7  % Final    Lymph % 2023 45.5  % Final    Mono % 2023 4.5  % Final    Eosinophil % 2023 0.8  % Final    Basophil % 2023 0.5  % Final    Iron 2023 78  45 - 160 mcg/dL Final    TIBC 2023 268  250 - 450 mcg/dL (calc) Final    Iron Saturation 2023 29  16 - 45 % (calc) Final    Hemoglobin A1C 2023 6.8 (H)  <5.7 % of total Hgb Final        Past Medical History:   Diagnosis Date    Anemia     Blood transfusion     Migraine     Positive PPD      DX- CXR OK- STARTED INH     Wears glasses     CONTACS     Past Surgical History:   Procedure Laterality Date    BREAST LUMPECTOMY      x 6 all benign     SECTION, CLASSIC      x 2    HEMORRHOID SURGERY      HYSTERECTOMY       12/2012    KIDNEY SURGERY      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  Tobacco History:  reports that she has never smoked. She has never used smokeless tobacco.  Drug History:  reports no history of drug use.    Review of patient's allergies indicates:   Allergen Reactions    Other      NARCOTICS MAKE ITCH, RESP DEPRESSION, EMOTIONAL       Current Outpatient Medications:     ergocalciferol (ERGOCALCIFEROL) 50,000 unit Cap, Take 1 capsule (50,000 Units total) by mouth every 7 days., Disp: 12 capsule, Rfl: 3    valsartan-hydrochlorothiazide (DIOVAN-HCT) 160-12.5 mg per tablet, Take 1 tablet by mouth once daily., Disp: 90 tablet, Rfl: 3    Review of Systems   Constitutional:  Negative for activity change, fatigue and unexpected weight change.   HENT:  Negative for hearing loss, postnasal drip, sinus pressure, sore throat and voice change.    Eyes:  Negative for photophobia and visual disturbance.   Respiratory:  Negative for cough, shortness of breath and wheezing.    Cardiovascular:  Negative for chest pain and palpitations.   Gastrointestinal:  Negative for constipation, diarrhea and nausea.   Genitourinary:  Negative for difficulty urinating, frequency, hematuria and urgency.   Musculoskeletal:  Positive for arthralgias. Negative for back pain.   Skin:  Negative for rash.   Neurological:  Negative for weakness, light-headedness and headaches.   Hematological:  Negative for adenopathy. Does not bruise/bleed easily.   Psychiatric/Behavioral:  The patient is not nervous/anxious.           Objective:          12/21/2023     3:22 PM 4/23/2024     8:40 AM   Vitals - 1 value per visit   SYSTOLIC 112 118   DIASTOLIC  "84 88   Pulse 92 92   SPO2  99 %   Weight (lb)  228   Weight (kg)  103.42   Height  5' 7" (1.702 m)   BMI (Calculated)  35.7      Physical Exam  Constitutional:       Appearance: Normal appearance.   HENT:      Head: Normocephalic and atraumatic.      Mouth/Throat:      Mouth: Mucous membranes are moist.   Eyes:      Conjunctiva/sclera: Conjunctivae normal.   Pulmonary:      Effort: Pulmonary effort is normal.   Neurological:      General: No focal deficit present.      Mental Status: She is alert and oriented to person, place, and time.   Psychiatric:         Mood and Affect: Mood normal.         Behavior: Behavior normal.           Assessment:       1. Primary hypertension    2. Primary osteoarthritis of left knee    3. Arthritis of right knee    4. Retina disorder    5. Ingrowing toenail    6. Colon cancer screening    7. Prediabetes         Plan:       Primary hypertension  Comments:  Well-controlled on current therapy    Primary osteoarthritis of left knee  -     Ambulatory referral/consult to Orthopedics; Future; Expected date: 04/30/2024    Arthritis of right knee  -     Ambulatory referral/consult to Orthopedics; Future; Expected date: 04/30/2024    Retina disorder  -     Ambulatory referral/consult to Ophthalmology; Future; Expected date: 04/30/2024    Ingrowing toenail  -     Ambulatory referral/consult to Podiatry; Future; Expected date: 04/30/2024    Colon cancer screening  -     Ambulatory referral/consult to Gastroenterology; Future; Expected date: 04/30/2024    Prediabetes  Comments:  Continue lifestyle modifications      Medication List with Changes/Refills   Current Medications    ERGOCALCIFEROL (ERGOCALCIFEROL) 50,000 UNIT CAP    Take 1 capsule (50,000 Units total) by mouth every 7 days.    VALSARTAN-HYDROCHLOROTHIAZIDE (DIOVAN-HCT) 160-12.5 MG PER TABLET    Take 1 tablet by mouth once daily.   Discontinued Medications    ACETAMINOPHEN (TYLENOL) 500 MG TABLET    Take 1,000 mg by mouth as needed.   "    ASPIRIN-ACETAMINOPHEN-CAFFEINE 250-250-65 MG (EXCEDRIN MIGRAINE) 250-250-65 MG PER TABLET    Take 1 tablet by mouth every 6 (six) hours as needed.     FLUTICASONE PROPIONATE (FLONASE) 50 MCG/ACTUATION NASAL SPRAY    1 spray (50 mcg total) by Each Nostril route once daily.      Follow up in about 6 months (around 10/23/2024) for HTN.         36.1